# Patient Record
Sex: FEMALE | Race: WHITE | NOT HISPANIC OR LATINO | Employment: OTHER | URBAN - METROPOLITAN AREA
[De-identification: names, ages, dates, MRNs, and addresses within clinical notes are randomized per-mention and may not be internally consistent; named-entity substitution may affect disease eponyms.]

---

## 2017-03-16 ENCOUNTER — GENERIC CONVERSION - ENCOUNTER (OUTPATIENT)
Dept: OTHER | Facility: OTHER | Age: 72
End: 2017-03-16

## 2017-03-30 ENCOUNTER — GENERIC CONVERSION - ENCOUNTER (OUTPATIENT)
Dept: OTHER | Facility: OTHER | Age: 72
End: 2017-03-30

## 2017-04-26 ENCOUNTER — GENERIC CONVERSION - ENCOUNTER (OUTPATIENT)
Dept: OTHER | Facility: OTHER | Age: 72
End: 2017-04-26

## 2017-04-28 ENCOUNTER — GENERIC CONVERSION - ENCOUNTER (OUTPATIENT)
Dept: OTHER | Facility: OTHER | Age: 72
End: 2017-04-28

## 2017-05-23 ENCOUNTER — ALLSCRIPTS OFFICE VISIT (OUTPATIENT)
Dept: OTHER | Facility: OTHER | Age: 72
End: 2017-05-23

## 2017-05-23 ENCOUNTER — GENERIC CONVERSION - ENCOUNTER (OUTPATIENT)
Dept: OTHER | Facility: OTHER | Age: 72
End: 2017-05-23

## 2017-05-23 DIAGNOSIS — R06.02 SHORTNESS OF BREATH: ICD-10-CM

## 2017-05-23 DIAGNOSIS — I35.1 NONRHEUMATIC AORTIC VALVE INSUFFICIENCY: ICD-10-CM

## 2017-05-23 DIAGNOSIS — I25.10 ATHEROSCLEROTIC HEART DISEASE OF NATIVE CORONARY ARTERY WITHOUT ANGINA PECTORIS: ICD-10-CM

## 2017-06-16 ENCOUNTER — HOSPITAL ENCOUNTER (OUTPATIENT)
Dept: NON INVASIVE DIAGNOSTICS | Facility: CLINIC | Age: 72
Discharge: HOME | End: 2017-06-16
Payer: MEDICARE

## 2017-06-16 DIAGNOSIS — I35.1 NONRHEUMATIC AORTIC VALVE INSUFFICIENCY: ICD-10-CM

## 2017-06-16 PROCEDURE — 93880 EXTRACRANIAL BILAT STUDY: CPT

## 2017-06-21 ENCOUNTER — HOSPITAL ENCOUNTER (OUTPATIENT)
Dept: NON INVASIVE DIAGNOSTICS | Facility: CLINIC | Age: 72
Discharge: HOME | End: 2017-06-21
Payer: MEDICARE

## 2017-06-21 DIAGNOSIS — I25.10 ATHEROSCLEROTIC HEART DISEASE OF NATIVE CORONARY ARTERY WITHOUT ANGINA PECTORIS: ICD-10-CM

## 2017-06-21 DIAGNOSIS — R06.02 SHORTNESS OF BREATH: ICD-10-CM

## 2017-06-21 DIAGNOSIS — I35.1 NONRHEUMATIC AORTIC VALVE INSUFFICIENCY: ICD-10-CM

## 2017-06-21 LAB
ARRHY DURING EX: NORMAL
CHEST PAIN STATEMENT: NORMAL
MAX DIASTOLIC BP: 62 MMHG
MAX HEART RATE: 78 BPM
MAX PREDICTED HEART RATE: 150 BPM
MAX. SYSTOLIC BP: 140 MMHG
PROTOCOL NAME: NORMAL
REASON FOR TERMINATION: NORMAL
TARGET HR FORMULA: NORMAL
TEST INDICATION: NORMAL
TIME IN EXERCISE PHASE: 180 S

## 2017-06-21 PROCEDURE — 93017 CV STRESS TEST TRACING ONLY: CPT

## 2017-06-21 PROCEDURE — A9502 TC99M TETROFOSMIN: HCPCS

## 2017-06-21 PROCEDURE — 93306 TTE W/DOPPLER COMPLETE: CPT

## 2017-06-21 PROCEDURE — 78452 HT MUSCLE IMAGE SPECT MULT: CPT

## 2017-06-21 RX ORDER — AMINOPHYLLINE DIHYDRATE 25 MG/ML
50 INJECTION, SOLUTION INTRAVENOUS ONCE
Status: COMPLETED | OUTPATIENT
Start: 2017-06-21 | End: 2017-06-21

## 2017-06-21 RX ADMIN — REGADENOSON 0.4 MG: 0.08 INJECTION, SOLUTION INTRAVENOUS at 13:16

## 2017-06-21 RX ADMIN — AMINOPHYLLINE 50 MG: 25 INJECTION, SOLUTION INTRAVENOUS at 13:46

## 2017-06-23 ENCOUNTER — GENERIC CONVERSION - ENCOUNTER (OUTPATIENT)
Dept: OTHER | Facility: OTHER | Age: 72
End: 2017-06-23

## 2017-09-26 ENCOUNTER — ALLSCRIPTS OFFICE VISIT (OUTPATIENT)
Dept: OTHER | Facility: OTHER | Age: 72
End: 2017-09-26

## 2018-01-11 ENCOUNTER — GENERIC CONVERSION - ENCOUNTER (OUTPATIENT)
Dept: OTHER | Facility: OTHER | Age: 73
End: 2018-01-11

## 2018-01-11 NOTE — PROGRESS NOTES
REPORT NAME: Progress Notes Report  VISIT DATE: 3/16/2017  VISIT TIME: 3:16 PM EDT  PATIENT NAME: Anderson Bass RECORD NUMBER: [id number]  YOB: 1945  AGE: 80  REFERRING  PHYSICIAN: Wayne Serra  SUPERVISING CLINICIAN: Seda Wong CARE PROVIDER: Huber Damon   PATIENT HOME ADDRESS: [address] [place] [address] [address]  PATIENT HOME PHONE: [phone]  SOCIAL SECURITY NUMBER:    DIAGNOSIS 1: Phlebitis/Thrombophlebitis: deep veins of lower extremities /  451 11  DIAGNOSIS 2: Long-term (current) use of anticoagulants / V58 61  INR RANGE: 2 - 3  INR GOAL: 2 5  TREATMENT START DATE:   TREATMENT END DATE:   NEXT  VISIT:     NEXT SELF TEST DATE: 5/5/2017  VISIT RESULTS  ENCOUNTER NUMBER:   TEST LOCATION: Home Testing  TEST TYPE: Patient Self Test (PST)  VISIT TYPE:   CURRENT INR: 5 PROTIME:   SPECIMEN COL AND RPT DATE: 3/16/2017 3:16 PM EDT   VITAL SIGNS  PULSE:  BP: / WEIGHT:  HEIGHT:  TEMP:   CURRENT ANTICOAGULANT DOSING SCHEDULE  DOSE SIZE: 5mg    ANTICOAGULANT TYPE: COUMADIN  DOSING REGIMEN  Sun       Mon       Tues      Wed       Thurs     Fri       Sat  Total/Wk   7 5       3 75      7 5       3 75      7 5       3 75      7 5       41 25  PATIENT MEDICATION INSTRUCTION: Yes  PATIENT NUTRITIONAL COUNSELING: No  PATIENT BRUISING INSTRUCTION: No  LAST EDUCATION DATE:   PST DETAILS  PST PATIENT TEST  DATE: 3/16/2017  PST PATIENT SUBMISSION DATE: 3/16/2017 1:36 PM EDT  PST INR: 5  NEXT PST TEST DATE: 5/5/2017  ASSESSMENT QUESTIONS AND ANSWERS:  Do you have any changes to your physician or personal information such as  address, phone  number or insurance? (If you answer yes, please provide  additional information in the comments section  ) : n/a  PREVIOUS VISIT INFORMATION  VISITDATE  INRGoal INR   Sun    Mon    Tues   Wed    Thurs  Fri    Sat  Total/wk  3/16/2017   2 5      5     7 5    3 75   7 5    3 75   7 5    3 75   7 5  41 25  3/1/2017    2 5     3 6   15 15     10     15     15     10     15  95  2/13/2017   2 5     2 6   15     15     10     15     15     10     15  95  1/25/2017   2 5      3 1   15     15     10     15     15     10     15  95  ADDITIONAL PREVIOUS VISIT INFORMATION  VISITDATE   PRIMARY RX               DOSE      CrCl  3/16/2017   COUMADIN                 5mg  3/1/2017    COUMADIN                 5mg   2/13/2017   COUMADIN                 5mg  1/25/2017   COUMADIN                 5mg  OTHER CURRENT MEDICATIONS:  PROGRESS NOTES: PER DR PHELPS HOLD 2 DAYS THEN TAKE 7 5/3  75MG (SHE WAS  TAKING 7 5MG QD) RECHECK 1 WEEK, SPOKE WITH PT  PATIENT  INSTRUCTIONS: SEE PROGRESS NOTE  TEST LOCATION: Home Testing, ,           ,             INBOUND LAB DATA:  Lab       Lab Value Col Date                 Rpt Date                 Lab  Reference Range  Electronically signed by: Richard Gomez   on 3/16/2017 3:16 PM EDT              Electronically signed Slade VASQUEZ    May  8 2017  5:45PM EST

## 2018-01-11 NOTE — RESULT NOTES
Verified Results  (1) PT WITH INR 24Apr2017 08:59AM [facility]     Test Name Result Flag Reference   INR 1 50 L 2 000 - 3 000   REPORT NAME: Progress Notes Report  VISIT DATE: 4/24/2017  VISIT TIME: 8:59 AM EDT  PATIENT NAME: Ricardo Crane   MEDICAL RECORD NUMBER: [id number]  YOB: 1945  AGE: 80  REFERRING PHYSICIAN: Mariana Caballero  SUPERVISING CLINICIAN: Seda Wong CARE PROVIDER: Ken Dodson   PATIENT HOME ADDRESS: [address] [place] [address] [address], [address]  PATIENT HOME PHONE: [phone]  SOCIAL SECURITY NUMBER:   DIAGNOSIS 1: Phlebitis/Thrombophlebitis: deep veins of lower extremities /  451 11  DIAGNOSIS 2: Long-term (current) use of anticoagulants / V58 61  INR RANGE: 2 - 3  INR GOAL: 2 5  TREATMENT START DATE:   TREATMENT END DATE:   NEXT VISIT:     NEXT SELF TEST DATE: 4/28/2017  VISIT RESULTS  ENCOUNTER NUMBER:   TEST LOCATION: Home Testing  TEST TYPE: Patient Self Test (PST)  VISIT TYPE:   CURRENT INR: 1 5 PROTIME:   SPECIMEN COL AND RPT DATE: 4/24/2017 8:59 AM  EDT  VITAL SIGNS  PULSE:  BP: / WEIGHT:  HEIGHT:  TEMP:   CURRENT ANTICOAGULANT DOSING SCHEDULE  DOSE SIZE: 5mg    ANTICOAGULANT TYPE: COUMADIN  DOSING REGIMEN  Sun       Mon       Tues      Wed       Thurs     Fri       Sat  Total/Wk  7 5       3 75      3 75      7 5       3 75      3 75      7 5       37 5  PATIENT MEDICATION INSTRUCTION: Yes  PATIENT NUTRITIONAL COUNSELING: No  PATIENT BRUISING INSTRUCTION: No  LAST EDUCATION DATE:   PST DETAILS  PST PATIENT TEST DATE: 4/24/2017  PST PATIENT SUBMISSION DATE: 4/24/2017 10:46 AM EDT  PST INR: 1 5  NEXT PST TEST DATE: 4/28/2017  ASSESSMENT QUESTIONS AND ANSWERS:  PREVIOUS VISIT INFORMATION  VISITDATE  INRGoal INR   Sun    Mon    Tues   Wed    Thurs  Fri    Sat  Total/wk  4/24/2017   2 5     1 5   7 5    3 75   3 75   7 5    3 75   3 75   7 5  37 5  3/30/2017   2 5     3 5   7 5    3 75   3 75   7 5    3 75   3 75   7 5  37 5  3/16/2017   2 5     5 7  5    3 75   7 5    3 75   7 5    3 75   7 5  41 25  3/1/2017    2 5     3 6   15     15     10     15     15     10     15  95  ADDITIONAL PREVIOUS VISIT INFORMATION  VISITDATE   PRIMARY RX               DOSE      CrCl  4/24/2017   COUMADIN                 5mg  3/30/2017   COUMADIN                 5mg  3/16/2017   COUMADIN                 5mg  3/1/2017    COUMADIN                 5mg  OTHER CURRENT MEDICATIONS:  COUMADIN  PROGRESS NOTES: PER DR Carmelia Baumgarten SAME DOSE RECHECK LATER THIS WEEK, JOE LEFT  MESSAGE ON NEW NUMBER VM  PATIENT INSTRUCTIONS: SEE PROGRESS NOTE  TEST LOCATION: Home Testing, ,           ,             INBOUND LAB DATA:  Lab       Lab Value Col Date                 Rpt Date                 Lab  Reference Range  Electronically signed by: Kem Choe  on 4/26/2017 8:59 AM EDT

## 2018-01-11 NOTE — RESULT NOTES
Verified Results  ECHO COMPLETE WITH CONTRAST IF INDICATED 2017 10:53AM Shakeel John Order Number: [id number]    - Patient Instructions: To schedule this appointment, please contact Central Scheduling at [phone]  Test Name Result Flag Reference   ECHO COMPLETE WITH CONTRAST IF INDICATED (Report)     [facility], [address] [place] [address]   [phone]     Transthoracic Echocardiogram   2D, M-mode, Doppler, and Color Doppler     Study date: 2017     Patient: Tim Falcon   MR number: [id number]   Account number: [de-identified]   : 1945   Age: 80 years   Gender: Female   Status: Outpatient   Location: [facility]   Height: 61 in   Weight: 241 lb   BP: 158/ 68 mmHg     Indications: Aortic valve disorder  Diagnoses: I35 0 - Nonrheumatic aortic (valve) stenosis     Sonographer: Palacios RCS   Interpreting Physician: Emily Galarza MD   Primary Physician: Anabel Jose DO   Referring Physician: Emily Galarza MD   Group: [facility]     SUMMARY     LEFT VENTRICLE:   There were no regional wall motion abnormalities  There was mild concentric hypertrophy  Doppler parameters were consistent with abnormal left ventricular relaxation (grade 1 diastolic dysfunction)  LEFT ATRIUM:   The atrium was mildly dilated  MITRAL VALVE:   There was mild annular calcification  There was trace regurgitation  AORTIC VALVE:   There was mild stenosis  TRICUSPID VALVE:   There was trace regurgitation  AORTA:   The root exhibited normal size and mild fibrocalcific change  HISTORY: PRIOR HISTORY: COPD  A Fib  CAD  Risk factors: hypertension, diabetes, hypercholesterolemia, and morbid obesity  PRIOR PROCEDURES: Stent  PROCEDURE: The study was performed in the [address] [place] [address]  This was a routine study  The transthoracic approach was used   The study included complete 2D imaging, M-mode, complete spectral Doppler, and color Doppler  The   heart rate was 62 bpm, at the start of the study  Images were obtained from the parasternal, apical, subcostal, and suprasternal notch acoustic windows  This was a technically difficult study  LEFT VENTRICLE: Size was normal  Systolic function was by visual assessment  Ejection fraction was estimated to be 65 %  There were no regional wall motion abnormalities  There was mild concentric hypertrophy  DOPPLER: Doppler parameters   were consistent with abnormal left ventricular relaxation (grade 1 diastolic dysfunction)  RIGHT VENTRICLE: The size was normal  Systolic function was normal  Wall thickness was normal      LEFT ATRIUM: The atrium was mildly dilated  RIGHT ATRIUM: Size was normal      MITRAL VALVE: There was mild annular calcification  DOPPLER: There was trace regurgitation  AORTIC VALVE: The valve was trileaflet  Leaflets exhibited mild calcification  DOPPLER: There was mild stenosis  TRICUSPID VALVE: DOPPLER: There was trace regurgitation  PERICARDIUM: There was no pericardial effusion  The pericardium was normal in appearance  AORTA: The root exhibited normal size and mild fibrocalcific change  PULMONARY ARTERY: DOPPLER: Systolic pressure was within the normal range  SYSTEM MEASUREMENT TABLES     Apical four chamber   4 chamber Left Atrium Volume Index; Planimetry; End Systole; Apical four chamber;: 19 24 cm2   Right Atrium Systolic Area; Planimetry; End Systole; Apical four chamber;: 15 86 cm2   TAPSE: 22 6 mm     Unspecified Scan Mode   Aortic Root Diameter; End Systole;: 26 5 mm   Cardiovascular Orifice Diameter; End Systole;: 21 6 mm   Gradient Pressure, Peak; Simplified Bernoulli; Antegrade Flow; Systole;: 26 mm[Hg]   Gradient pressure, average; Simplified Bernoulli; Antegrade Flow; Systole;: 13 9 mm[Hg]   Left atrial diameter; End Diastole;: 42 3 mm   Cardiac Output;  Teichholz; Systole;: 3 83 L/min   Interventricular Septum Diastolic Thickness; Teichholz; End Diastole;: 12 5 mm   Left Ventricle Internal End Diastolic Dimension; Teichholz;: 46 8 mm   Left Ventricle Internal Systolic Dimension; Teichholz; End Systole;: 31 2 mm   Left Ventricle Posterior Wall Diastolic Thickness; Teichholz; End Diastole;: 12 2 mm   Left Ventricular Ejection Fraction; Teichholz;: 62 %   Stroke volume;  Teichholz; Systole;: 62 8 ml   Mitral Valve Area; Area by Pressure Half-Time; Systole;: 4 31 cm2   Mitral Valve E to A Ratio; Systole;: 0 79   Maximum Tricuspid valve regurgitation pressure gradient; Regurgitant Flow; Systole;: 24 3 mm[Hg]     [facility]     Prepared and electronically signed by     Lea Calero MD   Signed 21-Jun-2017 13:33:21

## 2018-01-13 VITALS
HEART RATE: 60 BPM | HEIGHT: 62 IN | DIASTOLIC BLOOD PRESSURE: 68 MMHG | WEIGHT: 241.38 LBS | BODY MASS INDEX: 44.42 KG/M2 | SYSTOLIC BLOOD PRESSURE: 158 MMHG

## 2018-01-14 VITALS
BODY MASS INDEX: 44.97 KG/M2 | HEIGHT: 62 IN | WEIGHT: 244.38 LBS | HEART RATE: 71 BPM | DIASTOLIC BLOOD PRESSURE: 72 MMHG | SYSTOLIC BLOOD PRESSURE: 124 MMHG

## 2018-01-15 NOTE — RESULT NOTES
Verified Results  (1) PT WITH INR 28Apr2017 04:33PM Crys Miranda     Test Name Result Flag Reference   INR 1 80 L 2 000 - 3 000   REPORT NAME: Progress Notes Report  VISIT DATE: 4/28/2017  VISIT TIME: 4:33 PM EDT  PATIENT NAME: Viviana Muniz   MEDICAL RECORD NUMBER: [id number]  YOB: 1945  AGE: 80  REFERRING PHYSICIAN: Mirella Rondon  SUPERVISING CLINICIAN: Seda Wong CARE PROVIDER: Melly Cheek   PATIENT HOME ADDRESS: [address] [place] [address] [address], [address]  PATIENT HOME PHONE: [phone]  SOCIAL SECURITY NUMBER:   DIAGNOSIS 1: Phlebitis/Thrombophlebitis: deep veins of lower extremities /  451 11  DIAGNOSIS 2: Long-term (current) use of anticoagulants / V58 61  INR RANGE: 2 - 3  INR GOAL: 2 5  TREATMENT START DATE:   TREATMENT END DATE:   NEXT VISIT:     NEXT SELF TEST DATE: 5/5/2017  VISIT RESULTS  ENCOUNTER NUMBER:   TEST LOCATION: Home Testing  TEST TYPE: Patient Self Test (PST)  VISIT TYPE:   CURRENT INR: 1 8 PROTIME:   SPECIMEN COL AND RPT DATE: 4/28/2017 4:33 PM  EDT  VITAL SIGNS  PULSE:  BP: / WEIGHT:  HEIGHT:  TEMP:   CURRENT ANTICOAGULANT DOSING SCHEDULE  DOSE SIZE: 5mg    ANTICOAGULANT TYPE: COUMADIN  DOSING REGIMEN  Sun       Mon       Tues      Wed       Thurs     Fri       Sat  Total/Wk  7 5       3 75      3 75      7 5       3 75      3 75      7 5       37 5  PATIENT MEDICATION INSTRUCTION: Yes  PATIENT NUTRITIONAL COUNSELING: No  PATIENT BRUISING INSTRUCTION: No  LAST EDUCATION DATE:   PST DETAILS  PST PATIENT TEST DATE: 4/28/2017  PST PATIENT SUBMISSION DATE: 4/28/2017 4:04 PM EDT  PST INR: 1 8  NEXT PST TEST DATE: 5/5/2017  ASSESSMENT QUESTIONS AND ANSWERS:  Do you have any changes to your physician or personal information such as  address, phone number or insurance? (If you answer yes, please provide  additional information in the comments section  ) : No  PREVIOUS VISIT INFORMATION  VISITDATE  INRGoal INR   Sun    Mon    Tues   Wed    Kanika Kurk  Fri Sat  Total/wk  4/28/2017   2 5     1 8   7 5    3 75   3 75   7 5    3 75   3 75   7 5  37 5  4/24/2017   2 5     1 5   7 5    3 75   3 75   7 5    3 75   3 75   7 5  37 5  3/30/2017   2 5     3 5   7 5    3 75   3 75   7 5    3 75   3 75   7 5  37 5  3/16/2017   2 5     5     7 5    3 75   7 5    3 75   7 5    3 75   7 5  41 25  ADDITIONAL PREVIOUS VISIT INFORMATION  VISITDATE   PRIMARY RX               DOSE      CrCl  4/28/2017   COUMADIN                 5mg  4/24/2017   COUMADIN                 5mg  3/30/2017   COUMADIN                 5mg  3/16/2017   COUMADIN                 5mg  OTHER CURRENT MEDICATIONS:  COUMADIN  PROGRESS NOTES: PER AL/PA SAME DOSE RECHECK 2 WEEKS, LMOM  PATIENT INSTRUCTIONS: SEE PROGRESS NOTE  TEST LOCATION: Home Testing, ,           ,             INBOUND LAB DATA:  Lab       Lab Value Col Date                 Rpt Date                 Lab  Reference Range  Electronically signed by: Brendan Thomas  on 4/28/2017 4:33 PM EDT

## 2018-01-15 NOTE — PROGRESS NOTES
REPORT NAME: Progress Notes Report  VISIT DATE: 4/28/2017  VISIT TIME: 4:33 PM EDT  PATIENT NAME: Huey Melissa RECORD NUMBER: [id number]  YOB: 1945  AGE: 80  REFERRING  PHYSICIAN: Alayna Maxwell  SUPERVISING CLINICIAN: Seda Wong CARE PROVIDER: Bartolo Watts   PATIENT HOME ADDRESS: [address] [place] [address] [address], [address]  PATIENT HOME PHONE: [phone]  SOCIAL SECURITY NUMBER:    DIAGNOSIS 1: Phlebitis/Thrombophlebitis: deep veins of lower extremities /  451 11  DIAGNOSIS 2: Long-term (current) use of anticoagulants / V58 61  INR RANGE: 2 - 3  INR GOAL: 2 5  TREATMENT START DATE:   TREATMENT END DATE:   NEXT  VISIT:     NEXT SELF TEST DATE: 5/5/2017  VISIT RESULTS  ENCOUNTER NUMBER:   TEST LOCATION: Home Testing  TEST TYPE: Patient Self Test (PST)  VISIT TYPE:   CURRENT INR: 1 8 PROTIME:   SPECIMEN COL AND RPT DATE: 4/28/2017 4:33 PM   EDT  VITAL SIGNS  PULSE:  BP: / WEIGHT:  HEIGHT:  TEMP:   CURRENT ANTICOAGULANT DOSING SCHEDULE  DOSE SIZE: 5mg    ANTICOAGULANT TYPE: COUMADIN  DOSING REGIMEN  Sun       Mon       Tues      Wed       Thurs     Fri       Sat  Total/Wk   3 75      7 5       3 75      7 5       3 75      7 5       3 75      37 5  PATIENT MEDICATION INSTRUCTION: Yes  PATIENT NUTRITIONAL COUNSELING: No  PATIENT BRUISING INSTRUCTION: No  LAST EDUCATION DATE:   PST DETAILS  PST PATIENT TEST  DATE: 4/28/2017  PST PATIENT SUBMISSION DATE: 4/28/2017 4:04 PM EDT  PST INR: 1 8  NEXT PST TEST DATE: 5/5/2017  ASSESSMENT QUESTIONS AND ANSWERS:  Do you have any changes to your physician or personal information such as  address, phone  number or insurance? (If you answer yes, please provide  additional information in the comments section  ) : No  PREVIOUS VISIT INFORMATION  VISITDATE  INRGoal INR   Sun    Mon    Tues   Wed    Thurs  Fri    Sat  Total/wk  4/28/2017   2 5      1 8   3 75   7 5    3 75   7 5    3 75   7 5    3 75  37 5  4/24/2017   2 5     1 5 7  5    3 75   3 75   7 5    3 75   3 75   7 5  37 5  3/30/2017   2 5     3 5   7 5    3 75   3 75   7 5    3 75   3 75   7 5  37 5  3/16/2017    2 5     5     7 5    3 75   7 5    3 75   7 5    3 75   7 5  41 25  ADDITIONAL PREVIOUS VISIT INFORMATION  VISITDATE   PRIMARY RX               DOSE      CrCl  4/28/2017   COUMADIN                 5mg  4/24/2017   COUMADIN                  5mg  3/30/2017   COUMADIN                 5mg  3/16/2017   COUMADIN                 5mg  OTHER CURRENT MEDICATIONS:  COUMADIN  PROGRESS NOTES: PER AL/PA SAME DOSE RECHECK 2 WEEKS, LMOM  ---- Additional Notes entered on 5/1/2017 9:34  AM EDT by Ольга Diamond  :  PER DR Adriana Padilla TAKE 3 75/7 5MG RECHECK 1 WEEK  PATIENT INSTRUCTIONS: SEE PROGRESS NOTE  TEST LOCATION: Home Testing, ,           ,             INBOUND LAB DATA:  Lab       Lab Value Col Date                  Rpt Date                 Lab  Reference Range  Electronically signed by: Ольга Diamond  on 5/1/2017 9:34 AM EDT              Electronically signed Brooke VASQUEZ    May  1 2017 12:09PM EST

## 2018-01-16 NOTE — PROGRESS NOTES
REPORT NAME: Progress Notes Report  VISIT DATE: 3/30/2017  VISIT TIME: 11:38 AM EDT  PATIENT NAME: Kojo Gamez   MEDICAL RECORD NUMBER: [id number]  YOB: 1945  AGE: 80  REFERRING  PHYSICIAN: Lorena Oliva  SUPERVISING CLINICIAN: Seda Wong CARE PROVIDER: Eliseo Dumas   PATIENT HOME ADDRESS: [address] [place] [address] [address]  PATIENT HOME PHONE: [phone]  SOCIAL SECURITY NUMBER:    DIAGNOSIS 1: Phlebitis/Thrombophlebitis: deep veins of lower extremities /  451 11  DIAGNOSIS 2: Long-term (current) use of anticoagulants / V58 61  INR RANGE: 2 - 3  INR GOAL: 2 5  TREATMENT START DATE:   TREATMENT END DATE:   NEXT  VISIT:     NEXT SELF TEST DATE: 5/5/2017  VISIT RESULTS  ENCOUNTER NUMBER:   TEST LOCATION: Home Testing  TEST TYPE: Patient Self Test (PST)  VISIT TYPE:   CURRENT INR: 3 5 PROTIME:   SPECIMEN COL AND RPT DATE: 3/30/2017 11:38 AM   EDT  VITAL SIGNS  PULSE:  BP: / WEIGHT:  HEIGHT:  TEMP:   CURRENT ANTICOAGULANT DOSING SCHEDULE  DOSE SIZE: 5mg    ANTICOAGULANT TYPE: COUMADIN  DOSING REGIMEN  Sun       Mon Tues Wed Thurs Fri       Sat  Total/Wk   7 5       3 75      3 75      7 5       3 75      3 75      7 5       37 5  PATIENT MEDICATION INSTRUCTION: Yes  PATIENT NUTRITIONAL COUNSELING: No  PATIENT BRUISING INSTRUCTION: No  LAST EDUCATION DATE:   PST DETAILS  PST PATIENT TEST  DATE: 3/30/2017  PST PATIENT SUBMISSION DATE: 3/30/2017 8:10 AM EDT  PST INR: 3 5  NEXT PST TEST DATE: 5/5/2017  ASSESSMENT QUESTIONS AND ANSWERS:  Do you have any changes to your physician or personal information such as  address, phone  number or insurance? (If you answer yes, please provide  additional information in the comments section  ) : n/a  PREVIOUS VISIT INFORMATION  VISITDATE  INRGoal INR   Sun    Mon    Tues   Wed    Thurs  Fri    Sat  Total/wk  3/30/2017   2 5      3 5   7 5    3 75   3 75   7 5    3 75   3 75   7 5  37 5  3/16/2017   2 5     5 7  5    3 75   7 5    3 75   7 5    3 75   7 5  41 25  3/1/2017    2 5     3 6   15     15     10     15     15     10     15  95  2/13/2017   2 5      2 6   15     15     10     15     15     10     15  95  ADDITIONAL PREVIOUS VISIT INFORMATION  VISITDATE   PRIMARY RX               DOSE      CrCl  3/30/2017   COUMADIN                 5mg  3/16/2017   COUMADIN                 5mg   3/1/2017    COUMADIN                 5mg  2/13/2017   COUMADIN                 5mg  OTHER CURRENT MEDICATIONS:  PROGRESS NOTES: PER DR PHELPS DECREASE TO 7 5/3 75/3 75MG RECHECK 1 WEEK,  LEFT DETAILED MESSAGE ON MACHINE  PATIENT INSTRUCTIONS:  SEE PROGRESS NOTE  TEST LOCATION: Home Testing, ,           ,             INBOUND LAB DATA:  Lab       Lab Value Col Date                 Rpt Date                 Lab  Reference Range  Electronically signed by: Keely Boone  on 3/30/2017  11:38 AM EDT              Electronically signed Phi VASQUEZ    May  8 2017  5:44PM EST

## 2018-02-01 ENCOUNTER — TELEPHONE (OUTPATIENT)
Dept: CARDIOLOGY CLINIC | Facility: CLINIC | Age: 73
End: 2018-02-01

## 2018-02-01 NOTE — TELEPHONE ENCOUNTER
Is patient going to see Dr Frank Gutierrez or Dr Terrance Mcdonald, does she have a future appointment?

## 2018-02-05 DIAGNOSIS — I87.2 VENOUS INSUFFICIENCY: Primary | ICD-10-CM

## 2018-02-06 RX ORDER — WARFARIN SODIUM 7.5 MG/1
TABLET ORAL
Qty: 90 TABLET | Refills: 0 | Status: SHIPPED | OUTPATIENT
Start: 2018-02-06 | End: 2018-03-27 | Stop reason: HOSPADM

## 2018-02-12 ENCOUNTER — OFFICE VISIT (OUTPATIENT)
Dept: CARDIOLOGY CLINIC | Facility: CLINIC | Age: 73
End: 2018-02-12
Payer: MEDICARE

## 2018-02-12 VITALS
SYSTOLIC BLOOD PRESSURE: 136 MMHG | WEIGHT: 244 LBS | HEART RATE: 65 BPM | BODY MASS INDEX: 44.9 KG/M2 | HEIGHT: 62 IN | DIASTOLIC BLOOD PRESSURE: 86 MMHG | OXYGEN SATURATION: 98 %

## 2018-02-12 DIAGNOSIS — I10 ESSENTIAL HYPERTENSION: ICD-10-CM

## 2018-02-12 DIAGNOSIS — I35.0 AORTIC STENOSIS, MILD: ICD-10-CM

## 2018-02-12 DIAGNOSIS — I25.10 CORONARY ARTERIOSCLEROSIS: Primary | ICD-10-CM

## 2018-02-12 DIAGNOSIS — E78.2 MIXED HYPERLIPIDEMIA: ICD-10-CM

## 2018-02-12 DIAGNOSIS — E66.01 MORBID OBESITY DUE TO EXCESS CALORIES (HCC): ICD-10-CM

## 2018-02-12 DIAGNOSIS — D68.51 FACTOR V LEIDEN MUTATION (HCC): ICD-10-CM

## 2018-02-12 DIAGNOSIS — I51.89 DIASTOLIC DYSFUNCTION: ICD-10-CM

## 2018-02-12 PROCEDURE — 99214 OFFICE O/P EST MOD 30 MIN: CPT | Performed by: INTERNAL MEDICINE

## 2018-02-12 RX ORDER — WARFARIN SODIUM 5 MG/1
1 TABLET ORAL DAILY
COMMUNITY
Start: 2014-05-06 | End: 2018-03-27 | Stop reason: HOSPADM

## 2018-02-12 RX ORDER — NICOTINE POLACRILEX 4 MG/1
1 GUM, CHEWING ORAL 2 TIMES DAILY
COMMUNITY

## 2018-02-12 RX ORDER — POTASSIUM CHLORIDE 20 MEQ/1
1 TABLET, EXTENDED RELEASE ORAL 2 TIMES DAILY
COMMUNITY
Start: 2015-03-30 | End: 2018-03-22 | Stop reason: SDUPTHER

## 2018-02-12 RX ORDER — ROSUVASTATIN CALCIUM 10 MG/1
0.5 TABLET, COATED ORAL DAILY
COMMUNITY
Start: 2016-01-13

## 2018-02-12 RX ORDER — NADOLOL 40 MG/1
TABLET ORAL
COMMUNITY
End: 2018-03-27 | Stop reason: HOSPADM

## 2018-02-12 RX ORDER — GLIMEPIRIDE 2 MG/1
1 TABLET ORAL DAILY
COMMUNITY
Start: 2016-07-29

## 2018-02-12 RX ORDER — DIPHENOXYLATE HYDROCHLORIDE AND ATROPINE SULFATE 2.5; .025 MG/1; MG/1
1 TABLET ORAL DAILY
COMMUNITY

## 2018-02-12 RX ORDER — FUROSEMIDE 40 MG/1
1 TABLET ORAL DAILY
COMMUNITY
Start: 2016-06-06

## 2018-02-12 RX ORDER — CETIRIZINE HYDROCHLORIDE 10 MG/1
1 TABLET ORAL DAILY
COMMUNITY

## 2018-02-12 RX ORDER — WARFARIN SODIUM 10 MG/1
1 TABLET ORAL DAILY
COMMUNITY
Start: 2016-03-31

## 2018-02-12 RX ORDER — LISINOPRIL 2.5 MG/1
1 TABLET ORAL DAILY
Status: ON HOLD | COMMUNITY
Start: 2015-05-12 | End: 2018-03-27

## 2018-02-12 RX ORDER — BIOTIN 10 MG
TABLET ORAL
COMMUNITY

## 2018-02-12 NOTE — PROGRESS NOTES
CARDIOLOGY OFFICE VISIT  [facility]  [place] [address], [facility], [address] [place] [address], [facility], [address]  Tel: [phone]      NAME: Marti Layne  AGE: 71 y o  SEX: female  : 1945   MRN: [id number]      Chief Complaint:  Chief Complaint   Patient presents with    Follow-up     Aortic stenosis and HTN  currently on abx for cold  History of Present Illness:   Patient comes for follow up  States she is doing well from cardiac stand point  CAD s/p PCI to LAD and OM in  - denies chest pain / pressure, SOB, palpitations, lightheadedness, syncope, swelling feet, orthopnea, PND, claudication  On beta-blocker, Ace inhibitor, statin  Not on aspirin due to high bleeding risk along with Coumadin  HTN -  Has been hypertensive for many years  Taking medications regularly  Denies lightheadedness, headache, medication side effects  HLP -  Has had hyperlipidemia for many years  Taking statin regularly along with diet control  Denies myalgia  PCP closely monitoring the blood work  Obesity -  Trying to lose weight      Mild AS - per last echo    On anticoagulation with history of venous thrombosis and factor V deficiency       Past Medical History:  Past Medical History:   Diagnosis Date    Aortic ejection murmur     AS (aortic stenosis)     COPD (chronic obstructive pulmonary disease) (HCC)     Diabetes mellitus (HCC)     Exertional shortness of breath     HTN (hypertension)     Hyperlipidemia     Obesity          Past Surgical History:  Past Surgical History:   Procedure Laterality Date    ADENOIDECTOMY      CARDIAC CATHETERIZATION      HYSTERECTOMY      KNEE SURGERY      ROTATOR CUFF REPAIR      TONSILLECTOMY           Family History:  Family History   Problem Relation Age of Onset    COPD Mother          Social History:  Social History     Social History    Marital status:      Spouse name: N/A    Number of children: N/A    Years of education: N/A     Social History Main Topics    Smoking status: Former Smoker    Smokeless tobacco: Not on file    Alcohol use No    Drug use: Unknown    Sexual activity: Not on file     Other Topics Concern    Not on file     Social History Narrative    No narrative on file         Active Problems:  Patient Active Problem List   Diagnosis    Aortic stenosis, mild    Coronary arteriosclerosis    Hyperlipidemia    Hypertension    Asymptomatic varicose veins    Factor V Leiden mutation ([facility] )    Morbid obesity due to excess calories ([facility] )       The following portions of the patient's history were reviewed and updated as appropriate: past medical history, past surgical history, past family history,  past social history, current medications, allergies and problem list       Review of Systems:  Constitutional: Denies fever, chills, fatigue  Eyes: Denies eye redness, eye discharge, double vision  ENT: Denies hearing loss, tinnitus, sneezing, nasal discharge, sore throat   Respiratory: Denies cough, expectoration, hemoptysis, shortness of breath  Cardiovascular: Denies chest pain, palpitations, orthopnea, PND, lower extremity swelling  Gastrointestinal: Denies abdominal pain, nausea, vomiting, hematemesis, diarrhea, bloody stools  Genito-Urinary: Denies dysuria, incontinence  Musculoskeletal: Denies back pain, joint pain, muscle pain  Neurologic: Denies confusion, lightheadedness, syncope, headache, focal weakness, sensory changes, seizures  Endocrine: Denies polyuria, polydipsia, temperature intolerance  Allergy and Immunology: Denies hives, insect bite sensitivity  Hematological and Lymphatic: Denies bleeding problems, swollen glands   Psychological: Denies depression, suicidal ideation, anxiety, panic  Dermatological: Denies pruritus, rash, skin lesion changes      Vitals:  Vitals:    02/12/18 1319   BP: 136/86   Pulse: 65   SpO2: 98%       Body mass index is 45 36 kg/m²  Weight (last 2 days)     Date/Time   Weight    02/12/18 1319  111 (244)                Physical Examination:  General: Patient is not in acute distress  Awake, alert, oriented in time, place and person  Responding to commands  Head: Normocephalic  Atraumatic  Eyes: Both pupils normal sized, round and reactive to light  Nonicteric  ENT: Normal external ear canals  Nares normal, no drainage  Lips and oral mucosa normal  Neck: Supple  JVP not raised  Trachea central  No thyromegaly  Lungs: Bilateral bronchovascular breath sounds with no crackles or rhonchi  Chest wall: No tenderness  Cardiovascular: RRR  S1 and S2 normal  No murmur, rub or gallop  Gastrointestinal: Abdomen soft, nontender  No guarding or rigidity  Liver and spleen not palpable  Bowel sounds present  Neurologic: Patient is awake, alert, oriented in time, place and person  Responding to command  Moving all extremities  Integumentary:  No skin rash  Lymphatic: No cervical lymphadenopathy  Back: Symmetric   No CVA tenderness  Extremities: No clubbing, cyanosis or edema      Laboratory Results:  CBC with diff:   Lab Results   Component Value Date    WBC 5 87 06/04/2015    RBC 4 10 06/04/2015    HGB 11 9 06/04/2015    HCT 36 6 06/04/2015    MCV 89 06/04/2015    MCH 29 0 06/04/2015    RDW 13 0 06/04/2015     06/04/2015       CMP:  Lab Results   Component Value Date    CREATININE 0 73 06/04/2015    BUN 22 06/04/2015     06/04/2015    K 4 8 06/04/2015     06/04/2015    CO2 26 06/04/2015    GLUCOSE 145 (H) 06/04/2015    PROT 8 3 (H) 06/04/2015    ALKPHOS 49 06/04/2015    ALT 75 (H) 06/04/2015    AST 65 (H) 06/04/2015       No results found for: HGBA1C, MG, PHOS    No results found for: TROPONINI, CKMB, CKTOTAL    Lipid Profile:   Lab Results   Component Value Date    CHOL 143 06/04/2015     Lab Results   Component Value Date    HDL 36 06/04/2015     Lab Results   Component Value Date    LDLCALC 59 06/04/2015     Lab Results Component Value Date    TRIG 238 2015       Cardiac testing:   Results for orders placed during the hospital encounter of 17   Echo complete with contrast if indicated    Narrative [address] [facility]  [phone]    Transthoracic Echocardiogram  2D, M-mode, Doppler, and Color Doppler    Study date:  2017    Patient: Марина Gregory  MR number: [id number]  Account number: [de-identified]  : 1945  Age: 80 years  Gender: Female  Status: Outpatient  Location: [facility]  Height: 61 in  Weight: 241 lb  BP: 158/ 68 mmHg    Indications: Aortic valve disorder  Diagnoses: I35 0 - Nonrheumatic aortic (valve) stenosis    Sonographer:  Power RCS  Interpreting Physician:  Rosangela Acosta MD  Primary Physician:  Demi Alcazar DO  Referring Physician:  Rosangela Acosta MD  Group:  [facility]    SUMMARY    LEFT VENTRICLE:  There were no regional wall motion abnormalities  There was mild concentric hypertrophy  Doppler parameters were consistent with abnormal left ventricular relaxation (grade 1 diastolic dysfunction)  LEFT ATRIUM:  The atrium was mildly dilated  MITRAL VALVE:  There was mild annular calcification  There was trace regurgitation  AORTIC VALVE:  There was mild stenosis  TRICUSPID VALVE:  There was trace regurgitation  AORTA:  The root exhibited normal size and mild fibrocalcific change  HISTORY: PRIOR HISTORY: COPD  A Fib  CAD  Risk factors: hypertension, diabetes, hypercholesterolemia, and morbid obesity  PRIOR PROCEDURES: Stent  PROCEDURE: The study was performed in the [address] [place] [address]  This was a routine study  The transthoracic approach was used  The study included complete 2D imaging, M-mode, complete spectral Doppler, and color Doppler  The  heart rate was 62 bpm, at the start of the study   Images were obtained from the parasternal, apical, subcostal, and suprasternal notch acoustic windows  This was a technically difficult study  LEFT VENTRICLE: Size was normal  Systolic function was by visual assessment  Ejection fraction was estimated to be 65 %  There were no regional wall motion abnormalities  There was mild concentric hypertrophy  DOPPLER: Doppler parameters  were consistent with abnormal left ventricular relaxation (grade 1 diastolic dysfunction)  RIGHT VENTRICLE: The size was normal  Systolic function was normal  Wall thickness was normal     LEFT ATRIUM: The atrium was mildly dilated  RIGHT ATRIUM: Size was normal     MITRAL VALVE: There was mild annular calcification  DOPPLER: There was trace regurgitation  AORTIC VALVE: The valve was trileaflet  Leaflets exhibited mild calcification  DOPPLER: There was mild stenosis  TRICUSPID VALVE: DOPPLER: There was trace regurgitation  PERICARDIUM: There was no pericardial effusion  The pericardium was normal in appearance  AORTA: The root exhibited normal size and mild fibrocalcific change  PULMONARY ARTERY: DOPPLER: Systolic pressure was within the normal range  SYSTEM MEASUREMENT TABLES    Apical four chamber  4 chamber Left Atrium Volume Index; Planimetry; End Systole; Apical four chamber;: 19 24 cm2  Right Atrium Systolic Area; Planimetry; End Systole; Apical four chamber;: 15 86 cm2  TAPSE: 22 6 mm    Unspecified Scan Mode  Aortic Root Diameter; End Systole;: 26 5 mm  Cardiovascular Orifice Diameter; End Systole;: 21 6 mm  Gradient Pressure, Peak; Simplified Bernoulli; Antegrade Flow; Systole;: 26 mm[Hg]  Gradient pressure, average; Simplified Bernoulli; Antegrade Flow; Systole;: 13 9 mm[Hg]  Left atrial diameter; End Diastole;: 42 3 mm  Cardiac Output; Teichholz; Systole;: 3 83 L/min  Interventricular Septum Diastolic Thickness; Teichholz; End Diastole;: 12 5 mm  Left Ventricle Internal End Diastolic Dimension;  Teichholz;: 46 8 mm  Left Ventricle Internal Systolic Dimension; Teichholz; End Systole;: 31 2 mm  Left Ventricle Posterior Wall Diastolic Thickness; Teichholz; End Diastole;: 12 2 mm  Left Ventricular Ejection Fraction; Teichholz;: 62 %  Stroke volume;  Teichholz; Systole;: 62 8 ml  Mitral Valve Area; Area by Pressure Half-Time; Systole;: 4 31 cm2  Mitral Valve E to A Ratio; Systole;: 0 79  Maximum Tricuspid valve regurgitation pressure gradient; Regurgitant Flow; Systole;: 24 3 mm[Hg]    [facility] Accredited Echocardiography Laboratory    Prepared and electronically signed by    Supriya Perez MD  Signed 21-Jun-2017 13:33:21           Medications:    Current Outpatient Prescriptions:     Biotin 10 MG TABS, Take by mouth, Disp: , Rfl:     cetirizine (ZYRTEC ALLERGY) 10 mg tablet, Take 1 tablet by mouth daily, Disp: , Rfl:     furosemide (LASIX) 40 mg tablet, Take 1 tablet by mouth daily, Disp: , Rfl:     glimepiride (AMARYL) 2 mg tablet, Take 1 tablet by mouth daily, Disp: , Rfl:     lisinopril (ZESTRIL) 2 5 mg tablet, Take 1 tablet by mouth daily, Disp: , Rfl:     metFORMIN (GLUCOPHAGE) 1000 MG tablet, Take 1 tablet by mouth 2 (two) times a day, Disp: , Rfl:     multivitamin (THERAGRAN) TABS, Take 1 tablet by mouth daily, Disp: , Rfl:     nadolol (CORGARD) 40 mg tablet, Take by mouth, Disp: , Rfl:     Omega-3 Fatty Acids (FISH OIL) 645 MG CAPS, Take 1 capsule by mouth daily, Disp: , Rfl:     Omeprazole 20 MG TBEC, Take 1 capsule by mouth 2 (two) times a day, Disp: , Rfl:     potassium chloride (K-DUR,KLOR-CON) 20 mEq tablet, Take 1 tablet by mouth 2 (two) times a day, Disp: , Rfl:     rosuvastatin (CRESTOR) 10 MG tablet, Take 0 5 tablets by mouth daily, Disp: , Rfl:     warfarin (COUMADIN) 10 mg tablet, Take 1 tablet by mouth daily, Disp: , Rfl:     warfarin (COUMADIN) 5 mg tablet, Take 1 tablet by mouth daily, Disp: , Rfl:     warfarin (COUMADIN) 7 5 mg tablet, Take 1 tab daily or as directed, Disp: 90 tablet, Rfl: 0      Allergies: Allergies   Allergen Reactions    Iodine     Penicillins     Sulfa Antibiotics        Assessment and Plan:  1  Coronary arteriosclerosis  Stable  Continue statin, beta-blocker, Ace inhibitor  Not on aspirin due to high bleeding risk along with Coumadin    2  Aortic stenosis, mild  Latest echocardiogram findings discussed with the patient  Follow up with serial echocardiograms per guidelines    3  Essential hypertension  BP stable  Continue current medications    4  Mixed hyperlipidemia  Continue statin  Serial follow-up with lipid panel    5  Factor V Leiden mutation ([address] )  On lifelong Coumadin  INR goal 2-3  - Protime-INR    6  Morbid obesity due to excess calories (HCC)  Weight loss counseling provided    Recommend aggressive risk factor modification and therapeutic lifestyle changes  Low-salt, low-calorie, low-fat, low-cholesterol diet with regular exercise and to optimize weight  I will defer the ordering and monitoring of necessity lab studies to you, but I am available and happy to review and manage any of the data at your request in the future  Discussed concepts of atherosclerosis, including signs and symptoms of cardiac disease  Previous studies were reviewed  Safety measures were reviewed  Questions were entertained and answered  Patient was advised to report any problems requiring medical attention  Follow-up with PCP and appropriate specialist and lab work as discussed  Return for follow up visit as scheduled or earlier, if needed  Thank you for allowing me to participate in the care and evaluation of your patient  Should you have any questions, please feel free to contact me        Braden Boothe MD  6/28/0577,3:43 PM

## 2018-02-22 ENCOUNTER — TELEPHONE (OUTPATIENT)
Dept: CARDIOLOGY CLINIC | Facility: CLINIC | Age: 73
End: 2018-02-22

## 2018-02-26 NOTE — RESULT NOTES
Verified Results  (1) PT WITH INR [id number] 11:39AM Naomie Gay     Test Name Result Flag Reference   INR 1 90 L 2 000 - 3 000   REPORT NAME: Progress Notes Report  VISIT DATE: 1/10/2018  VISIT TIME: 11:39 AM EST  PATIENT NAME: Wiley Le RECORD NUMBER: [id number]  YOB: 1945  AGE: 71  REFERRING PHYSICIAN: Melecio Barnes  SUPERVISING CLINICIAN: Luke Melgoza CARE PROVIDER: Rik Johnson  [facility] [address]  PATIENT HOME PHONE: [phone]  SOCIAL SECURITY NUMBER:   DIAGNOSIS 1: Venous insufficiency (chronic) (peripheral) / I87 2  DIAGNOSIS 2: Long-term (current) use of anticoagulants / V58 61  INR RANGE: 2 - 3  INR GOAL: 2 5  TREATMENT START DATE:   TREATMENT END DATE:   NEXT VISIT:     NEXT SELF TEST DATE: 1/23/2018  VISIT RESULTS  ENCOUNTER NUMBER:   TEST LOCATION: Home Testing  TEST TYPE: Patient Self Test (PST)  VISIT TYPE:   CURRENT INR: 1 9 PROTIME:   SPECIMEN COL AND RPT DATE: 1/10/2018 11:39 AM  EST  VITAL SIGNS  PULSE:  BP: / WEIGHT:  HEIGHT:  TEMP:   CURRENT ANTICOAGULANT DOSING SCHEDULE  DOSE SIZE: 5mg    ANTICOAGULANT TYPE: COUMADIN  DOSING REGIMEN  Sun       Mon Tues Wed Thurs Fri       Sat  Total/Wk  7 5       7 5       7 5       7 5       7 5       7 5       7 5       52 5  PATIENT MEDICATION INSTRUCTION: Yes  PATIENT NUTRITIONAL COUNSELING: No  PATIENT BRUISING INSTRUCTION: No  LAST EDUCATION DATE:   PST DETAILS  PST PATIENT TEST DATE: 1/10/2018  PST PATIENT SUBMISSION DATE: 1/10/2018 3:10 PM EST  PST INR: 1 9  NEXT PST TEST DATE: 1/23/2018  ASSESSMENT QUESTIONS AND ANSWERS  PREVIOUS VISIT INFORMATION  VISITDATE  INRGoal INR   Sun    Mon Tues Wed Thurs Fri    Sat  Total/wk  1/10/2018   2 5     1 9   7 5    7 5    7 5    7 5    7 5    7 5    7 5  52 5  12/19/2017  2 5     2 1   7 5    7 5    7 5    7 5    7 5    7 5    7 5  52 5  11/28/2017  2 5     2     7 5    7 5 7 5    7 5    7 5    7 5    7 5  52 5  11/8/2017   2 5     2 9   7 5    7 5    7 5    7 5    7 5    7 5    7 5  52 5  ADDITIONAL PREVIOUS VISIT INFORMATION  VISITDATE   PRIMARY RX               DOSE      CrCl  1/10/2018   COUMADIN                 5mg  12/19/2017  COUMADIN                 5mg  11/28/2017  COUMADIN                 5mg  11/8/2017   COUMADIN                 5mg  OTHER CURRENT MEDICATIONS:  COUMADIN  PROGRESS NOTES: PER DR PEREIRA SAME DOSE RECHECK 2 WEEKS, PT AWARE NO CALL NEEDED  WHEN INR WITHIN RANGE AND CHECKS EVERY 2 WEEKS  PATIENT INSTRUCTIONS: SEE PROGRESS NOTE  TEST LOCATION: Home Testing, ,           ,             INBOUND LAB DATA:  Lab       Lab Value Col Date                 Rpt Date                 Lab  Reference Range  Electronically signed by: Lesa Barbour on 1/11/2018 11:39 AM EST

## 2018-03-08 ENCOUNTER — TELEPHONE (OUTPATIENT)
Dept: CARDIOLOGY CLINIC | Facility: CLINIC | Age: 73
End: 2018-03-08

## 2018-03-12 ENCOUNTER — TELEPHONE (OUTPATIENT)
Dept: CARDIOLOGY CLINIC | Facility: CLINIC | Age: 73
End: 2018-03-12

## 2018-03-12 LAB — INR PPP: 1.6 (ref 0.86–1.16)

## 2018-03-13 NOTE — TELEPHONE ENCOUNTER
Left detailed message for patient   Advised per Se6  10mg tonight  Than 7 5mg QD (same dose)  Recheck 10 days    Asked to call back if any questions

## 2018-03-22 ENCOUNTER — APPOINTMENT (EMERGENCY)
Dept: CT IMAGING | Facility: HOSPITAL | Age: 73
DRG: 305 | End: 2018-03-22
Payer: MEDICARE

## 2018-03-22 ENCOUNTER — APPOINTMENT (INPATIENT)
Dept: ULTRASOUND IMAGING | Facility: HOSPITAL | Age: 73
DRG: 305 | End: 2018-03-22
Payer: MEDICARE

## 2018-03-22 ENCOUNTER — HOSPITAL ENCOUNTER (INPATIENT)
Facility: HOSPITAL | Age: 73
LOS: 5 days | Discharge: HOME HEALTH | DRG: 305 | End: 2018-03-27
Attending: INTERNAL MEDICINE | Admitting: INTERNAL MEDICINE
Payer: MEDICARE

## 2018-03-22 DIAGNOSIS — E78.5 HYPERLIPIDEMIA: Primary | ICD-10-CM

## 2018-03-22 DIAGNOSIS — I16.9 HYPERTENSIVE CRISIS: ICD-10-CM

## 2018-03-22 DIAGNOSIS — N30.00 ACUTE CYSTITIS WITHOUT HEMATURIA: ICD-10-CM

## 2018-03-22 DIAGNOSIS — R51.9 HEADACHE: ICD-10-CM

## 2018-03-22 DIAGNOSIS — E87.6 HYPOKALEMIA: Primary | ICD-10-CM

## 2018-03-22 DIAGNOSIS — I25.10 CORONARY ARTERIOSCLEROSIS: ICD-10-CM

## 2018-03-22 DIAGNOSIS — D68.51 FACTOR V LEIDEN MUTATION (HCC): ICD-10-CM

## 2018-03-22 DIAGNOSIS — I16.9 HYPERTENSIVE CRISIS, UNSPECIFIED: ICD-10-CM

## 2018-03-22 DIAGNOSIS — I10 ESSENTIAL HYPERTENSION: ICD-10-CM

## 2018-03-22 PROBLEM — J44.9 COPD (CHRONIC OBSTRUCTIVE PULMONARY DISEASE) (HCC): Status: ACTIVE | Noted: 2018-03-22

## 2018-03-22 PROBLEM — I48.91 ATRIAL FIBRILLATION (HCC): Chronic | Status: ACTIVE | Noted: 2018-03-22

## 2018-03-22 LAB
ALBUMIN SERPL BCP-MCNC: 3.5 G/DL (ref 3.5–5)
ALP SERPL-CCNC: 49 U/L (ref 46–116)
ALT SERPL W P-5'-P-CCNC: 58 U/L (ref 12–78)
ANION GAP SERPL CALCULATED.3IONS-SCNC: 10 MMOL/L (ref 4–13)
APTT PPP: 37 SECONDS (ref 23–35)
APTT PPP: 37 SECONDS (ref 23–35)
AST SERPL W P-5'-P-CCNC: 37 U/L (ref 5–45)
ATRIAL RATE: 62 BPM
BACTERIA UR QL AUTO: ABNORMAL /HPF
BASOPHILS # BLD AUTO: 0.06 THOUSANDS/ΜL (ref 0–0.1)
BASOPHILS NFR BLD AUTO: 1 % (ref 0–1)
BILIRUB SERPL-MCNC: 0.5 MG/DL (ref 0.2–1)
BILIRUB UR QL STRIP: NEGATIVE
BUN SERPL-MCNC: 21 MG/DL (ref 5–25)
CALCIUM SERPL-MCNC: 9.4 MG/DL (ref 8.3–10.1)
CHLORIDE SERPL-SCNC: 101 MMOL/L (ref 100–108)
CLARITY UR: CLEAR
CO2 SERPL-SCNC: 29 MMOL/L (ref 21–32)
COLOR UR: YELLOW
CREAT SERPL-MCNC: 0.87 MG/DL (ref 0.6–1.3)
EOSINOPHIL # BLD AUTO: 0.17 THOUSAND/ΜL (ref 0–0.61)
EOSINOPHIL NFR BLD AUTO: 2 % (ref 0–6)
ERYTHROCYTE [DISTWIDTH] IN BLOOD BY AUTOMATED COUNT: 12.6 % (ref 11.6–15.1)
ERYTHROCYTE [DISTWIDTH] IN BLOOD BY AUTOMATED COUNT: 12.6 % (ref 11.6–15.1)
ERYTHROCYTE [SEDIMENTATION RATE] IN BLOOD: 53 MM/HOUR (ref 0–20)
GFR SERPL CREATININE-BSD FRML MDRD: 67 ML/MIN/1.73SQ M
GLUCOSE SERPL-MCNC: 127 MG/DL (ref 65–140)
GLUCOSE SERPL-MCNC: 164 MG/DL (ref 65–140)
GLUCOSE UR STRIP-MCNC: NEGATIVE MG/DL
HCT VFR BLD AUTO: 36.6 % (ref 34.8–46.1)
HCT VFR BLD AUTO: 39.6 % (ref 34.8–46.1)
HGB BLD-MCNC: 12.3 G/DL (ref 11.5–15.4)
HGB BLD-MCNC: 13.1 G/DL (ref 11.5–15.4)
HGB UR QL STRIP.AUTO: ABNORMAL
INR PPP: 1.65 (ref 0.86–1.16)
INR PPP: 1.66 (ref 0.86–1.16)
KETONES UR STRIP-MCNC: NEGATIVE MG/DL
LEUKOCYTE ESTERASE UR QL STRIP: NEGATIVE
LYMPHOCYTES # BLD AUTO: 2.19 THOUSANDS/ΜL (ref 0.6–4.47)
LYMPHOCYTES NFR BLD AUTO: 26 % (ref 14–44)
MCH RBC QN AUTO: 29 PG (ref 26.8–34.3)
MCH RBC QN AUTO: 29.2 PG (ref 26.8–34.3)
MCHC RBC AUTO-ENTMCNC: 33.1 G/DL (ref 31.4–37.4)
MCHC RBC AUTO-ENTMCNC: 33.6 G/DL (ref 31.4–37.4)
MCV RBC AUTO: 87 FL (ref 82–98)
MCV RBC AUTO: 88 FL (ref 82–98)
MONOCYTES # BLD AUTO: 0.75 THOUSAND/ΜL (ref 0.17–1.22)
MONOCYTES NFR BLD AUTO: 9 % (ref 4–12)
NEUTROPHILS # BLD AUTO: 5.08 THOUSANDS/ΜL (ref 1.85–7.62)
NEUTS SEG NFR BLD AUTO: 61 % (ref 43–75)
NITRITE UR QL STRIP: POSITIVE
NON-SQ EPI CELLS URNS QL MICRO: ABNORMAL /HPF
NRBC BLD AUTO-RTO: 0 /100 WBCS
PH UR STRIP.AUTO: 6 [PH] (ref 4.5–8)
PLATELET # BLD AUTO: 248 THOUSANDS/UL (ref 149–390)
PLATELET # BLD AUTO: 252 THOUSANDS/UL (ref 149–390)
PMV BLD AUTO: 9.9 FL (ref 8.9–12.7)
PMV BLD AUTO: 9.9 FL (ref 8.9–12.7)
POTASSIUM SERPL-SCNC: 4.3 MMOL/L (ref 3.5–5.3)
PROT SERPL-MCNC: 8.5 G/DL (ref 6.4–8.2)
PROT UR STRIP-MCNC: ABNORMAL MG/DL
PROTHROMBIN TIME: 19.9 SECONDS (ref 12.1–14.4)
PROTHROMBIN TIME: 20.1 SECONDS (ref 12.1–14.4)
QRS AXIS: 54 DEGREES
QRSD INTERVAL: 88 MS
QT INTERVAL: 440 MS
QTC INTERVAL: 446 MS
RBC # BLD AUTO: 4.21 MILLION/UL (ref 3.81–5.12)
RBC # BLD AUTO: 4.51 MILLION/UL (ref 3.81–5.12)
RBC #/AREA URNS AUTO: ABNORMAL /HPF
SODIUM SERPL-SCNC: 140 MMOL/L (ref 136–145)
SP GR UR STRIP.AUTO: 1.01 (ref 1–1.03)
T WAVE AXIS: 37 DEGREES
TROPONIN I SERPL-MCNC: <0.02 NG/ML
TSH SERPL DL<=0.05 MIU/L-ACNC: 0.62 UIU/ML (ref 0.36–3.74)
UROBILINOGEN UR QL STRIP.AUTO: 0.2 E.U./DL
VENTRICULAR RATE: 62 BPM
WBC # BLD AUTO: 8.28 THOUSAND/UL (ref 4.31–10.16)
WBC # BLD AUTO: 8.64 THOUSAND/UL (ref 4.31–10.16)
WBC #/AREA URNS AUTO: ABNORMAL /HPF

## 2018-03-22 PROCEDURE — 93005 ELECTROCARDIOGRAM TRACING: CPT | Performed by: PHYSICIAN ASSISTANT

## 2018-03-22 PROCEDURE — 80053 COMPREHEN METABOLIC PANEL: CPT | Performed by: PHYSICIAN ASSISTANT

## 2018-03-22 PROCEDURE — 81001 URINALYSIS AUTO W/SCOPE: CPT | Performed by: NURSE PRACTITIONER

## 2018-03-22 PROCEDURE — 87476 LYME DIS DNA AMP PROBE: CPT | Performed by: NURSE PRACTITIONER

## 2018-03-22 PROCEDURE — 85610 PROTHROMBIN TIME: CPT | Performed by: PHYSICIAN ASSISTANT

## 2018-03-22 PROCEDURE — 85730 THROMBOPLASTIN TIME PARTIAL: CPT | Performed by: PHYSICIAN ASSISTANT

## 2018-03-22 PROCEDURE — 85027 COMPLETE CBC AUTOMATED: CPT | Performed by: NURSE PRACTITIONER

## 2018-03-22 PROCEDURE — 82948 REAGENT STRIP/BLOOD GLUCOSE: CPT

## 2018-03-22 PROCEDURE — 99285 EMERGENCY DEPT VISIT HI MDM: CPT

## 2018-03-22 PROCEDURE — 99222 1ST HOSP IP/OBS MODERATE 55: CPT | Performed by: PSYCHIATRY & NEUROLOGY

## 2018-03-22 PROCEDURE — 85730 THROMBOPLASTIN TIME PARTIAL: CPT | Performed by: NURSE PRACTITIONER

## 2018-03-22 PROCEDURE — 84443 ASSAY THYROID STIM HORMONE: CPT | Performed by: NURSE PRACTITIONER

## 2018-03-22 PROCEDURE — 84484 ASSAY OF TROPONIN QUANT: CPT | Performed by: PHYSICIAN ASSISTANT

## 2018-03-22 PROCEDURE — 85025 COMPLETE CBC W/AUTO DIFF WBC: CPT | Performed by: PHYSICIAN ASSISTANT

## 2018-03-22 PROCEDURE — 85610 PROTHROMBIN TIME: CPT | Performed by: NURSE PRACTITIONER

## 2018-03-22 PROCEDURE — 93010 ELECTROCARDIOGRAM REPORT: CPT | Performed by: INTERNAL MEDICINE

## 2018-03-22 PROCEDURE — 85652 RBC SED RATE AUTOMATED: CPT | Performed by: NURSE PRACTITIONER

## 2018-03-22 PROCEDURE — 84484 ASSAY OF TROPONIN QUANT: CPT | Performed by: NURSE PRACTITIONER

## 2018-03-22 PROCEDURE — 70450 CT HEAD/BRAIN W/O DYE: CPT

## 2018-03-22 PROCEDURE — 36415 COLL VENOUS BLD VENIPUNCTURE: CPT | Performed by: PHYSICIAN ASSISTANT

## 2018-03-22 PROCEDURE — 86038 ANTINUCLEAR ANTIBODIES: CPT | Performed by: NURSE PRACTITIONER

## 2018-03-22 RX ORDER — PRAVASTATIN SODIUM 40 MG
40 TABLET ORAL
Status: DISCONTINUED | OUTPATIENT
Start: 2018-03-22 | End: 2018-03-27 | Stop reason: HOSPADM

## 2018-03-22 RX ORDER — LORATADINE 10 MG/1
10 TABLET ORAL DAILY
Status: DISCONTINUED | OUTPATIENT
Start: 2018-03-22 | End: 2018-03-27 | Stop reason: HOSPADM

## 2018-03-22 RX ORDER — POTASSIUM CHLORIDE 20 MEQ/1
20 TABLET, EXTENDED RELEASE ORAL 2 TIMES DAILY
Qty: 180 TABLET | Refills: 0 | Status: SHIPPED | OUTPATIENT
Start: 2018-03-22 | End: 2018-06-27 | Stop reason: SDUPTHER

## 2018-03-22 RX ORDER — HEPARIN SODIUM 1000 [USP'U]/ML
2000 INJECTION, SOLUTION INTRAVENOUS; SUBCUTANEOUS AS NEEDED
Status: DISCONTINUED | OUTPATIENT
Start: 2018-03-22 | End: 2018-03-26

## 2018-03-22 RX ORDER — AMLODIPINE BESYLATE 5 MG/1
10 TABLET ORAL DAILY
Status: DISCONTINUED | OUTPATIENT
Start: 2018-03-23 | End: 2018-03-23

## 2018-03-22 RX ORDER — CHLORHEXIDINE GLUCONATE 0.12 MG/ML
15 RINSE ORAL EVERY 12 HOURS SCHEDULED
Status: DISCONTINUED | OUTPATIENT
Start: 2018-03-22 | End: 2018-03-27 | Stop reason: HOSPADM

## 2018-03-22 RX ORDER — FUROSEMIDE 40 MG/1
40 TABLET ORAL DAILY
Status: DISCONTINUED | OUTPATIENT
Start: 2018-03-22 | End: 2018-03-27 | Stop reason: HOSPADM

## 2018-03-22 RX ORDER — LIDOCAINE HYDROCHLORIDE 10 MG/ML
INJECTION, SOLUTION EPIDURAL; INFILTRATION; INTRACAUDAL; PERINEURAL
Status: COMPLETED
Start: 2018-03-22 | End: 2018-03-22

## 2018-03-22 RX ORDER — NADOLOL 40 MG/1
40 TABLET ORAL DAILY
Status: DISCONTINUED | OUTPATIENT
Start: 2018-03-22 | End: 2018-03-23

## 2018-03-22 RX ORDER — LISINOPRIL 5 MG/1
5 TABLET ORAL DAILY
Status: DISCONTINUED | OUTPATIENT
Start: 2018-03-23 | End: 2018-03-22

## 2018-03-22 RX ORDER — ACETAMINOPHEN 325 MG/1
650 TABLET ORAL EVERY 6 HOURS PRN
Status: DISCONTINUED | OUTPATIENT
Start: 2018-03-22 | End: 2018-03-27 | Stop reason: HOSPADM

## 2018-03-22 RX ORDER — LISINOPRIL 5 MG/1
5 TABLET ORAL DAILY
Status: DISCONTINUED | OUTPATIENT
Start: 2018-03-22 | End: 2018-03-27 | Stop reason: HOSPADM

## 2018-03-22 RX ORDER — HEPARIN SODIUM 10000 [USP'U]/100ML
3-20 INJECTION, SOLUTION INTRAVENOUS
Status: DISCONTINUED | OUTPATIENT
Start: 2018-03-22 | End: 2018-03-26

## 2018-03-22 RX ORDER — AMLODIPINE BESYLATE 5 MG/1
5 TABLET ORAL ONCE
Status: COMPLETED | OUTPATIENT
Start: 2018-03-22 | End: 2018-03-22

## 2018-03-22 RX ORDER — WARFARIN SODIUM 5 MG/1
5 TABLET ORAL
Status: COMPLETED | OUTPATIENT
Start: 2018-03-22 | End: 2018-03-22

## 2018-03-22 RX ORDER — HEPARIN SODIUM 5000 [USP'U]/ML
5000 INJECTION, SOLUTION INTRAVENOUS; SUBCUTANEOUS EVERY 8 HOURS SCHEDULED
Status: DISCONTINUED | OUTPATIENT
Start: 2018-03-22 | End: 2018-03-22

## 2018-03-22 RX ORDER — HEPARIN SODIUM 1000 [USP'U]/ML
4000 INJECTION, SOLUTION INTRAVENOUS; SUBCUTANEOUS AS NEEDED
Status: DISCONTINUED | OUTPATIENT
Start: 2018-03-22 | End: 2018-03-26

## 2018-03-22 RX ORDER — HYDRALAZINE HYDROCHLORIDE 20 MG/ML
20 INJECTION INTRAMUSCULAR; INTRAVENOUS EVERY 6 HOURS PRN
Status: DISCONTINUED | OUTPATIENT
Start: 2018-03-22 | End: 2018-03-27 | Stop reason: HOSPADM

## 2018-03-22 RX ORDER — HYDRALAZINE HYDROCHLORIDE 20 MG/ML
10 INJECTION INTRAMUSCULAR; INTRAVENOUS ONCE
Status: COMPLETED | OUTPATIENT
Start: 2018-03-22 | End: 2018-03-22

## 2018-03-22 RX ORDER — DOCUSATE SODIUM 100 MG/1
100 CAPSULE, LIQUID FILLED ORAL 2 TIMES DAILY
Status: DISCONTINUED | OUTPATIENT
Start: 2018-03-22 | End: 2018-03-27 | Stop reason: HOSPADM

## 2018-03-22 RX ORDER — PANTOPRAZOLE SODIUM 20 MG/1
20 TABLET, DELAYED RELEASE ORAL
Status: DISCONTINUED | OUTPATIENT
Start: 2018-03-22 | End: 2018-03-27 | Stop reason: HOSPADM

## 2018-03-22 RX ORDER — SENNOSIDES 8.6 MG
1 TABLET ORAL
Status: DISCONTINUED | OUTPATIENT
Start: 2018-03-22 | End: 2018-03-27 | Stop reason: HOSPADM

## 2018-03-22 RX ORDER — LABETALOL HYDROCHLORIDE 5 MG/ML
10 INJECTION, SOLUTION INTRAVENOUS ONCE
Status: COMPLETED | OUTPATIENT
Start: 2018-03-22 | End: 2018-03-22

## 2018-03-22 RX ADMIN — HEPARIN SODIUM 11.1 UNITS/KG/HR: 10000 INJECTION, SOLUTION INTRAVENOUS at 16:50

## 2018-03-22 RX ADMIN — PRAVASTATIN SODIUM 40 MG: 40 TABLET ORAL at 16:49

## 2018-03-22 RX ADMIN — HYDRALAZINE HYDROCHLORIDE 10 MG: 20 INJECTION INTRAMUSCULAR; INTRAVENOUS at 14:31

## 2018-03-22 RX ADMIN — LISINOPRIL 5 MG: 5 TABLET ORAL at 20:30

## 2018-03-22 RX ADMIN — LIDOCAINE HYDROCHLORIDE 20 MG: 10 INJECTION, SOLUTION EPIDURAL; INFILTRATION; INTRACAUDAL; PERINEURAL at 16:45

## 2018-03-22 RX ADMIN — ACETAMINOPHEN 650 MG: 325 TABLET, FILM COATED ORAL at 22:30

## 2018-03-22 RX ADMIN — LABETALOL 20 MG/4 ML (5 MG/ML) INTRAVENOUS SYRINGE 10 MG: at 15:57

## 2018-03-22 RX ADMIN — WARFARIN SODIUM 5 MG: 5 TABLET ORAL at 19:25

## 2018-03-22 RX ADMIN — CHLORHEXIDINE GLUCONATE 15 ML: 1.2 RINSE ORAL at 20:41

## 2018-03-22 RX ADMIN — AMLODIPINE BESYLATE 5 MG: 5 TABLET ORAL at 21:51

## 2018-03-22 RX ADMIN — SODIUM CHLORIDE 5 MG/HR: 0.9 INJECTION, SOLUTION INTRAVENOUS at 17:57

## 2018-03-22 NOTE — PROCEDURES
Arterial Line Insertion  Date/Time: 3/22/2018 6:58 PM  Performed by: Ousmane Barber  Authorized by: Ousmane Barber     Patient location:  Bedside  Consent:     Consent obtained:  Emergent situation    Consent given by:  Patient    Risks discussed:  Bleeding, ischemia, repeat procedure, infection and pain  Universal protocol:     Procedure explained and questions answered to patient or proxy's satisfaction: yes      Relevant documents present and verified: yes      Test results available and properly labeled: yes      Imaging studies available: yes      Required blood products, implants, devices, and special equipment available: yes      Site/side marked: yes      Immediately prior to procedure a time out was called: yes      Patient identity confirmed:  Hospital-assigned identification number and verbally with patient  Indications:     Indications: hemodynamic monitoring, continuous blood pressure monitoring and frequent labs / infusion    Pre-procedure details:     Skin preparation:  Chlorhexidine    Preparation: Patient was prepped and draped in sterile fashion    Anesthesia (see MAR for exact dosages): Anesthesia method:  Local infiltration    Local anesthetic:  Lidocaine 2% w/o epi  Procedure details:     Location / Tip of Catheter:  Radial    Laterality:  Right    Edin's test performed: yes      Edin's test abnormal: no      Needle gauge:  20 G    Placement technique:  Percutaneous    Number of attempts:  2    Successful placement: yes      Transducer: waveform confirmed    Post-procedure details:     Post-procedure:  Biopatch applied, secured with tape, sterile dressing applied and sutured    CMS:  Normal    Patient tolerance of procedure:   Tolerated well, no immediate complications

## 2018-03-22 NOTE — ED PROVIDER NOTES
History  Chief Complaint   Patient presents with    Hypertension     pts primary care  sent her to be checked b/c of hypertension  pt has been being seen for pain in her eye for the past few days  had a CT scan two days ago     71 y o  Female presents with her family for evaluation of left eye pain, headache, higher than normal blood pressure  Family notes pt  Has been c o  Eye pain and left sided headache for several days and has not been quite herself for approximately 1 week  They note she has been less talkative, not always making sense, and "out of it" at times  She denies V/D, SOB, Cp, palpitations, fevers, sick contacts recent falls; Pt  Currently on coumadin, Lasix 40mg po qod, CORGARD, Lisinopril and Crestor managed by cardiology she states last INR was 2-3 days ago and was 2 2  Follows with PCP and cardiologist regularly  Family reports pt  Had outpt CT scan 2 days ago, never received official results  Prior to Admission Medications   Prescriptions Last Dose Informant Patient Reported? Taking?    Biotin 10 MG TABS   Yes No   Sig: Take by mouth   Omega-3 Fatty Acids (FISH OIL) 645 MG CAPS   Yes No   Sig: Take 1 capsule by mouth daily   Omeprazole 20 MG TBEC   Yes No   Sig: Take 1 capsule by mouth 2 (two) times a day   cetirizine (ZYRTEC ALLERGY) 10 mg tablet   Yes No   Sig: Take 1 tablet by mouth daily   furosemide (LASIX) 40 mg tablet   Yes No   Sig: Take 1 tablet by mouth daily   glimepiride (AMARYL) 2 mg tablet   Yes No   Sig: Take 1 tablet by mouth daily   lisinopril (ZESTRIL) 2 5 mg tablet   Yes No   Sig: Take 1 tablet by mouth daily   metFORMIN (GLUCOPHAGE) 1000 MG tablet   Yes No   Sig: Take 1 tablet by mouth 2 (two) times a day   multivitamin (THERAGRAN) TABS   Yes No   Sig: Take 1 tablet by mouth daily   nadolol (CORGARD) 40 mg tablet   Yes No   Sig: Take by mouth   potassium chloride (K-DUR,KLOR-CON) 20 mEq tablet   No No   Sig: Take 1 tablet (20 mEq total) by mouth 2 (two) times a day   rosuvastatin (CRESTOR) 10 MG tablet   Yes No   Sig: Take 0 5 tablets by mouth daily   warfarin (COUMADIN) 10 mg tablet   Yes No   Sig: Take 1 tablet by mouth daily   warfarin (COUMADIN) 5 mg tablet   Yes No   Sig: Take 1 tablet by mouth daily   warfarin (COUMADIN) 7 5 mg tablet   No No   Sig: Take 1 tab daily or as directed      Facility-Administered Medications: None       Past Medical History:   Diagnosis Date    Aortic ejection murmur     AS (aortic stenosis)     Atrial fibrillation (HCC)     COPD (chronic obstructive pulmonary disease) (HCC)     Diabetes mellitus (HCC)     Exertional shortness of breath     HTN (hypertension)     Hyperlipidemia     Obesity        Past Surgical History:   Procedure Laterality Date    ADENOIDECTOMY      CARDIAC CATHETERIZATION      HYSTERECTOMY      KNEE SURGERY      ROTATOR CUFF REPAIR      TONSILLECTOMY         Family History   Problem Relation Age of Onset    COPD Mother      I have reviewed and agree with the history as documented  Social History   Substance Use Topics    Smoking status: Never Smoker    Smokeless tobacco: Never Used    Alcohol use No        Review of Systems   Eyes: Positive for pain  Cardiovascular: Positive for leg swelling  Negative for chest pain and palpitations  Gastrointestinal: Positive for nausea  Neurological: Positive for headaches  Negative for dizziness, tremors, seizures, syncope, facial asymmetry, speech difficulty, weakness, light-headedness and numbness  All other systems reviewed and are negative        Physical Exam  ED Triage Vitals   Temperature Pulse Respirations Blood Pressure SpO2   03/22/18 1125 03/22/18 1125 03/22/18 1125 03/22/18 1128 03/22/18 1125   97 8 °F (36 6 °C) 69 16 (!) 224/95 97 %      Temp Source Heart Rate Source Patient Position - Orthostatic VS BP Location FiO2 (%)   03/22/18 1125 03/22/18 1125 03/22/18 1128 03/22/18 1128 --   Oral Monitor Sitting Right arm       Pain Score 03/22/18 1125       Worst Possible Pain           Orthostatic Vital Signs  Vitals:    03/22/18 1125 03/22/18 1128   BP:  (!) 224/95   Pulse: 69    Patient Position - Orthostatic VS:  Sitting       Physical Exam   Constitutional: She is oriented to person, place, and time  She appears well-developed and well-nourished  HENT:   Head: Normocephalic and atraumatic  Right Ear: External ear normal    Left Ear: External ear normal    Eyes: Conjunctivae, EOM and lids are normal  Pupils are equal, round, and reactive to light    + photophobia, left eye   Neck: Normal range of motion  Neck supple  Cardiovascular: Normal rate, regular rhythm and intact distal pulses  1+ pedal edema   Pulmonary/Chest: Effort normal and breath sounds normal    Abdominal: Soft  Bowel sounds are normal    Musculoskeletal: Normal range of motion  Neurological: She is alert and oriented to person, place, and time  Skin: Skin is warm  Capillary refill takes less than 2 seconds  Psychiatric: She has a normal mood and affect  Her behavior is normal    Nursing note and vitals reviewed  ED Medications  Medications - No data to display    Diagnostic Studies  Results Reviewed     None                 No orders to display              Procedures  Procedures       Phone Contacts  ED Phone Contact    ED Course  ED Course                                MDM  Number of Diagnoses or Management Options  Headache: new and requires workup  Hypertensive crisis, unspecified: new and requires workup  Diagnosis management comments: Pt  Is A&Ox3, HYPERTENSIVE, -SOB,CP, Dizziness, PERRLA, + photophobia, +EOMI, NVI, no assymmetry of face, smile, no tongue deviation, LS CTA b/l, RRR, abd soft NT/ND, Bsx4; Cap refill <2seconds, 1 + pedal edema, 2+ pedal pulses  Spoke with Dr Linda Dejesus pt still with SBP in the 220's s/p 10mg Hydralazine asked to contact critical care for admission with them  Spoke with Dr Mary Oreilly will accept  Patient for admission  CritCare Time    Disposition  Final diagnoses:   None     ED Disposition     None      Follow-up Information    None       Patient's Medications   Discharge Prescriptions    No medications on file     No discharge procedures on file      ED Provider  Electronically Signed by           Elmer Grimes PA-C  03/22/18 1753
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
## 2018-03-22 NOTE — ED NOTES
pts family to bring in pts home cpap machine for hs     Erika Germain, [address] [facility]  03/22/18 0142

## 2018-03-22 NOTE — PLAN OF CARE
CARDIOVASCULAR - ADULT     Maintains optimal cardiac output and hemodynamic stability Progressing        Potential for Falls     Patient will remain free of falls Progressing

## 2018-03-23 ENCOUNTER — APPOINTMENT (INPATIENT)
Dept: ULTRASOUND IMAGING | Facility: HOSPITAL | Age: 73
DRG: 305 | End: 2018-03-23
Payer: MEDICARE

## 2018-03-23 ENCOUNTER — APPOINTMENT (INPATIENT)
Dept: MRI IMAGING | Facility: HOSPITAL | Age: 73
DRG: 305 | End: 2018-03-23
Payer: MEDICARE

## 2018-03-23 PROBLEM — E11.9 DIABETES (HCC): Status: ACTIVE | Noted: 2018-03-23

## 2018-03-23 LAB
ANION GAP SERPL CALCULATED.3IONS-SCNC: 10 MMOL/L (ref 4–13)
APTT PPP: 41 SECONDS (ref 23–35)
APTT PPP: 54 SECONDS (ref 23–35)
APTT PPP: 75 SECONDS (ref 23–35)
BASOPHILS # BLD AUTO: 0.04 THOUSANDS/ΜL (ref 0–0.1)
BASOPHILS NFR BLD AUTO: 1 % (ref 0–1)
BUN SERPL-MCNC: 19 MG/DL (ref 5–25)
CALCIUM SERPL-MCNC: 9.1 MG/DL (ref 8.3–10.1)
CHLORIDE SERPL-SCNC: 102 MMOL/L (ref 100–108)
CHOLEST SERPL-MCNC: 169 MG/DL (ref 50–200)
CO2 SERPL-SCNC: 27 MMOL/L (ref 21–32)
CREAT SERPL-MCNC: 0.75 MG/DL (ref 0.6–1.3)
EOSINOPHIL # BLD AUTO: 0.16 THOUSAND/ΜL (ref 0–0.61)
EOSINOPHIL NFR BLD AUTO: 2 % (ref 0–6)
ERYTHROCYTE [DISTWIDTH] IN BLOOD BY AUTOMATED COUNT: 12.6 % (ref 11.6–15.1)
GFR SERPL CREATININE-BSD FRML MDRD: 80 ML/MIN/1.73SQ M
GLUCOSE SERPL-MCNC: 116 MG/DL (ref 65–140)
GLUCOSE SERPL-MCNC: 133 MG/DL (ref 65–140)
GLUCOSE SERPL-MCNC: 137 MG/DL (ref 65–140)
GLUCOSE SERPL-MCNC: 172 MG/DL (ref 65–140)
GLUCOSE SERPL-MCNC: 310 MG/DL (ref 65–140)
HCT VFR BLD AUTO: 35.4 % (ref 34.8–46.1)
HDLC SERPL-MCNC: 41 MG/DL (ref 40–60)
HGB BLD-MCNC: 11.7 G/DL (ref 11.5–15.4)
INR PPP: 1.63 (ref 0.86–1.16)
LDLC SERPL CALC-MCNC: 87 MG/DL (ref 0–100)
LYMPHOCYTES # BLD AUTO: 2.52 THOUSANDS/ΜL (ref 0.6–4.47)
LYMPHOCYTES NFR BLD AUTO: 31 % (ref 14–44)
MAGNESIUM SERPL-MCNC: 1.3 MG/DL (ref 1.6–2.6)
MCH RBC QN AUTO: 29 PG (ref 26.8–34.3)
MCHC RBC AUTO-ENTMCNC: 33.1 G/DL (ref 31.4–37.4)
MCV RBC AUTO: 88 FL (ref 82–98)
MONOCYTES # BLD AUTO: 0.81 THOUSAND/ΜL (ref 0.17–1.22)
MONOCYTES NFR BLD AUTO: 10 % (ref 4–12)
NEUTROPHILS # BLD AUTO: 4.55 THOUSANDS/ΜL (ref 1.85–7.62)
NEUTS SEG NFR BLD AUTO: 56 % (ref 43–75)
NRBC BLD AUTO-RTO: 0 /100 WBCS
PHOSPHATE SERPL-MCNC: 4.3 MG/DL (ref 2.3–4.1)
PLATELET # BLD AUTO: 223 THOUSANDS/UL (ref 149–390)
PMV BLD AUTO: 9.6 FL (ref 8.9–12.7)
POTASSIUM SERPL-SCNC: 3.7 MMOL/L (ref 3.5–5.3)
PROTHROMBIN TIME: 19.8 SECONDS (ref 12.1–14.4)
RBC # BLD AUTO: 4.04 MILLION/UL (ref 3.81–5.12)
RYE IGE QN: NEGATIVE
SODIUM SERPL-SCNC: 139 MMOL/L (ref 136–145)
TRIGL SERPL-MCNC: 204 MG/DL
WBC # BLD AUTO: 8.1 THOUSAND/UL (ref 4.31–10.16)

## 2018-03-23 PROCEDURE — 85730 THROMBOPLASTIN TIME PARTIAL: CPT | Performed by: PHYSICIAN ASSISTANT

## 2018-03-23 PROCEDURE — 83735 ASSAY OF MAGNESIUM: CPT | Performed by: PHYSICIAN ASSISTANT

## 2018-03-23 PROCEDURE — 85730 THROMBOPLASTIN TIME PARTIAL: CPT | Performed by: INTERNAL MEDICINE

## 2018-03-23 PROCEDURE — 70551 MRI BRAIN STEM W/O DYE: CPT

## 2018-03-23 PROCEDURE — 82948 REAGENT STRIP/BLOOD GLUCOSE: CPT

## 2018-03-23 PROCEDURE — 80061 LIPID PANEL: CPT | Performed by: INTERNAL MEDICINE

## 2018-03-23 PROCEDURE — 85025 COMPLETE CBC W/AUTO DIFF WBC: CPT | Performed by: PHYSICIAN ASSISTANT

## 2018-03-23 PROCEDURE — 85730 THROMBOPLASTIN TIME PARTIAL: CPT | Performed by: NURSE PRACTITIONER

## 2018-03-23 PROCEDURE — 84100 ASSAY OF PHOSPHORUS: CPT | Performed by: PHYSICIAN ASSISTANT

## 2018-03-23 PROCEDURE — 80048 BASIC METABOLIC PNL TOTAL CA: CPT | Performed by: PHYSICIAN ASSISTANT

## 2018-03-23 PROCEDURE — 99222 1ST HOSP IP/OBS MODERATE 55: CPT | Performed by: INTERNAL MEDICINE

## 2018-03-23 PROCEDURE — 93975 VASCULAR STUDY: CPT | Performed by: SURGERY

## 2018-03-23 PROCEDURE — 93975 VASCULAR STUDY: CPT

## 2018-03-23 PROCEDURE — 85610 PROTHROMBIN TIME: CPT | Performed by: PHYSICIAN ASSISTANT

## 2018-03-23 PROCEDURE — 99232 SBSQ HOSP IP/OBS MODERATE 35: CPT | Performed by: PSYCHIATRY & NEUROLOGY

## 2018-03-23 RX ORDER — AMLODIPINE BESYLATE 5 MG/1
5 TABLET ORAL DAILY
Status: DISCONTINUED | OUTPATIENT
Start: 2018-03-23 | End: 2018-03-25

## 2018-03-23 RX ORDER — MAGNESIUM SULFATE HEPTAHYDRATE 40 MG/ML
2 INJECTION, SOLUTION INTRAVENOUS
Status: DISCONTINUED | OUTPATIENT
Start: 2018-03-23 | End: 2018-03-23

## 2018-03-23 RX ORDER — ALPRAZOLAM 0.5 MG/1
0.5 TABLET ORAL ONCE
Status: COMPLETED | OUTPATIENT
Start: 2018-03-23 | End: 2018-03-23

## 2018-03-23 RX ORDER — CARVEDILOL 12.5 MG/1
12.5 TABLET ORAL 2 TIMES DAILY WITH MEALS
Status: DISCONTINUED | OUTPATIENT
Start: 2018-03-23 | End: 2018-03-27 | Stop reason: HOSPADM

## 2018-03-23 RX ORDER — POTASSIUM CHLORIDE 20 MEQ/1
40 TABLET, EXTENDED RELEASE ORAL ONCE
Status: COMPLETED | OUTPATIENT
Start: 2018-03-23 | End: 2018-03-23

## 2018-03-23 RX ADMIN — INSULIN LISPRO 5 UNITS: 100 INJECTION, SOLUTION INTRAVENOUS; SUBCUTANEOUS at 18:16

## 2018-03-23 RX ADMIN — NADOLOL 40 MG: 40 TABLET ORAL at 08:50

## 2018-03-23 RX ADMIN — LISINOPRIL 5 MG: 5 TABLET ORAL at 08:49

## 2018-03-23 RX ADMIN — PANTOPRAZOLE SODIUM 20 MG: 20 TABLET, DELAYED RELEASE ORAL at 08:00

## 2018-03-23 RX ADMIN — HEPARIN SODIUM 2000 UNITS: 1000 INJECTION, SOLUTION INTRAVENOUS; SUBCUTANEOUS at 02:03

## 2018-03-23 RX ADMIN — POTASSIUM CHLORIDE 40 MEQ: 1500 TABLET, EXTENDED RELEASE ORAL at 08:53

## 2018-03-23 RX ADMIN — AMLODIPINE BESYLATE 5 MG: 5 TABLET ORAL at 08:50

## 2018-03-23 RX ADMIN — CARVEDILOL 12.5 MG: 12.5 TABLET, FILM COATED ORAL at 15:56

## 2018-03-23 RX ADMIN — LORATADINE 10 MG: 10 TABLET ORAL at 08:50

## 2018-03-23 RX ADMIN — PRAVASTATIN SODIUM 40 MG: 40 TABLET ORAL at 15:56

## 2018-03-23 RX ADMIN — PANTOPRAZOLE SODIUM 20 MG: 20 TABLET, DELAYED RELEASE ORAL at 15:09

## 2018-03-23 RX ADMIN — HEPARIN SODIUM 13.1 UNITS/KG/HR: 10000 INJECTION, SOLUTION INTRAVENOUS at 12:59

## 2018-03-23 RX ADMIN — FUROSEMIDE 40 MG: 40 TABLET ORAL at 08:50

## 2018-03-23 RX ADMIN — ACETAMINOPHEN 650 MG: 325 TABLET, FILM COATED ORAL at 16:06

## 2018-03-23 RX ADMIN — SENNOSIDES 8.6 MG: 8.6 TABLET, FILM COATED ORAL at 21:46

## 2018-03-23 RX ADMIN — MAGNESIUM SULFATE HEPTAHYDRATE 2 G: 40 INJECTION, SOLUTION INTRAVENOUS at 08:49

## 2018-03-23 RX ADMIN — CHLORHEXIDINE GLUCONATE 15 ML: 1.2 RINSE ORAL at 21:44

## 2018-03-23 RX ADMIN — CHLORHEXIDINE GLUCONATE 15 ML: 1.2 RINSE ORAL at 08:51

## 2018-03-23 RX ADMIN — HEPARIN SODIUM 4000 UNITS: 1000 INJECTION, SOLUTION INTRAVENOUS; SUBCUTANEOUS at 18:07

## 2018-03-23 RX ADMIN — ALPRAZOLAM 0.5 MG: 0.5 TABLET ORAL at 12:58

## 2018-03-23 RX ADMIN — DOCUSATE SODIUM 100 MG: 100 CAPSULE, LIQUID FILLED ORAL at 08:49

## 2018-03-23 NOTE — PLAN OF CARE
Problem: DISCHARGE PLANNING - CARE MANAGEMENT  Goal: Discharge to post-acute care or home with appropriate resources  INTERVENTIONS:  - Conduct assessment to determine patient/family and health care team treatment goals, and need for post-acute services based on payer coverage, community resources, and patient preferences, and barriers to discharge  - Address psychosocial, clinical, and financial barriers to discharge as identified in assessment in conjunction with the patient/family and health care team  - Arrange appropriate level of post-acute services according to patients   needs and preference and payer coverage in collaboration with the physician and health care team  - Communicate with and update the patient/family, physician, and health care team regarding progress on the discharge plan  - Arrange appropriate transportation to post-acute venues  Outcome: Progressing  CM met with pt at bedside  Pt lives alone in a 2 story house with no SHAN, but 13 steps w/railing to reach the main living quarters and bedroom  Pt is able to navigate steps, but does so slowly due to knee pain  She uses a cane, Cpap, and a nebulizer  She is independent with ADL's  She was in [facility] for rehab 5 years ago following a fall that resulted in a fractured shoulder  She also was in rehab 2 years ago in [place] following a knee replacement  She used [facility], but does not remember the name of the agency  She uses [employer] in [place] and has no problem with the co-pays  Denies substance abuse or mental health issues  She has an Advanced Directive and her POA is her daughter Rhoda Narvaez  Her PCP is Dr Lj Chong  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

## 2018-03-23 NOTE — SOCIAL WORK
CM met with pt at bedside  Pt lives alone in a 2 story house with no SHAN, but 13 steps w/railing to reach the main living quarters and bedroom  Pt is able to navigate steps, but does so slowly due to knee pain  She uses a cane, Cpap, and a nebulizer  She is independent with ADL's  She was in [facility] for rehab 5 years ago following a fall that resulted in a fractured shoulder  She also was in rehab 2 years ago in [place] following a knee replacement  She used [facility] services, but does not remember the name of the agency  She uses [employer] in [place] and has no problem with the co-pays  Denies substance abuse or mental health issues  She has an Advanced Directive and her POA is her daughter Ileana Woods  Her PCP is Dr Stephen Mckenzie  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

## 2018-03-23 NOTE — PROGRESS NOTES
Progress Note - ICU Transfer to Step down/med  surg  Zuly Moore 71 y o  female MRN: [id number]    Unit/Bed#:  Encounter: [id number]    Code Status: Level 1 - Full Code  POA:    POLST:      Reason for ICU adm: hypertensive emergency     Active problems:   Patient Active Problem List   Diagnosis    Aortic stenosis, mild    Coronary arteriosclerosis    Hyperlipidemia    Hypertension    Asymptomatic varicose veins    Factor V Leiden mutation ([place] [address] )    Morbid obesity due to excess calories ([place] [address] )    Hypertensive crisis    Atrial fibrillation ([place] [address] )    COPD (chronic obstructive pulmonary disease) ([place] [address] )    Diabetes ([place] [address] )       Consultants:   Neurology  Cardiology      History of Present Illness/Summary of clinical course:   Leroy Nurse is a 71 y o  female who presents with a past medical history of CAD s/p PCI to the LAD and OM in 2000, HTN, HLD, obesity, mild aortic stenosis, venous thrombosis secondary to factor V Leiden mutation on coumadin therapy, and COPD  She states that she has had symptoms of headache and visual disturbances for approximately 2 weeks  She recently had bronchitis and the symptoms seemed to start after that  She developed head ache and left eye pain  She had noted that her blood pressure had been higher than normal   Her family members had noted that she was "off" from her baseline  She was less talkative, and not always speaking coherently  She had an outpatient CT of the head  However, she does not know the results of this scan  Her symptoms persisted, which prompted her visit to the ED  She was noted to be hypertensive with systolic blood pressure in the 250's  CT of the showing no acute intracranial abnormalities  She was started on a cardene gtt for a short period of time  Her lisinopril dosing was increased to 5 mg  Norvasc 5 mg was added to her regimen and her nadolol was changed to coreg 12 5 mg BID    Her hypertension is significantly more controlled  She is being followed by neurology for potential ocular migraines  Her sedimentation rate was elevated  However, this is likely secondary to her recent infection with bronchitis  An MRI of the brain is pending  Recent or scheduled procedures:   CT head without contrast   Final Result      No acute intracranial abnormality  Workstation performed: [id number]         VAS renal artery complete    (Results Pending)   MRI brain wo contrast    (Results Pending)       Outstanding/pending diagnostics:   MRI completed, results are pending  Renal artery ultrasound pending        Mobilization Plan:   OOB    Nutrition Plan: cardiac diet    Discharge Plan:    Patient should be ready for discharge to home when BP is stable and definitive antihypertensive regimen is established   Initial PT/OT/ST Recommendations: pending    Initial /Plan: pending       Spoke with Dr Maryan Durham at [phone] regarding transfer  Portions of the record may have been created with voice recognition software  Occasional wrong word or "sound a like" substitutions may have occurred due to the inherent limitations of voice recognition software  Read the chart carefully and recognize, using context, where substitutions have occurred

## 2018-03-23 NOTE — PROGRESS NOTES
Neurology progress note  Leroy Nurse 71 y o  female MRN: [id number]  Unit/Bed#:  Encounter: [id number]      Assessment:  1  Headaches with visual dysfunction; -much improved today by patient report  She reports she has slept well overnight and her sleep coupled with likely the normalization of her blood pressure has helped to reduce her headaches and done overall  Her sed rate at 53 is likely to have a multiplicity of triggers given her a hospitalization and premorbid history  Her SAMSON was negative and her Lyme titer is still pending  She does not have significant tenderness at her temples on exam today so I doubt that temporal arteritis is present given her room in in all realms  Her history of ocular migraines makes her at increased risk for headaches and migraines particularly in the setting of a hypertensive crisis  Her MRI of the brain on my review has no area of concern  Provided the radiology also reduce her film as within normal limits there should be no further need for continued ongoing neurologic care  Her exam at this time is also improved and nonfocal compared with her exam yesterday we have discussed with her that headaches that are generated by possibly hypertensive crisis do not always resolve in a corresponding or chronologically appropriate time  As the blood pressure is return to normal  Provided she continues to improve as she has she will not need neurologic follow-up  Please call us for any questions  She was discussed with the Internal Medicine critical care nurse practitioner in the intensive care unit  2  Hypertensive crisis- appears to be improving  She has quit still being followed by the critical care service as a step-down patient in the ICU  I anticipate that if her MRI is negative and her blood pressure and other physiologic parameters are within normal limits she can be transferred out to the floor likely tonight or tomorrow        Subjective:  I really do feel some much better today my headache is not nearly as bad as it was yesterday  Review of Systems:  The patient reports that her headache yesterday as well as the residual headache that she has today is very similar to her priors only it seemed like this 1 was more exaggerated  She denies any visual disturbances aside from her baseline changes  She denies any paracervical or cervical complaints  No chest pain no shortness of breath  The remainder of her cued query for both neurologic as well complaints was negative  Allergies   Allergen Reactions    Iodine     Penicillins     Shellfish-Derived Products     Sulfa Antibiotics        Meds:  Scheduled Meds:    Current Facility-Administered Medications:  acetaminophen 650 mg Oral Q6H PRN   amLODIPine 5 mg Oral Daily   carvedilol 12 5 mg Oral BID With Meals   chlorhexidine 15 mL Swish & Spit Q12H [facility]   docusate sodium 100 mg Oral BID   furosemide 40 mg Oral Daily   heparin (porcine) 3-20 Units/kg/hr (Order-Specific) Intravenous Titrated   heparin (porcine) 2,000 Units Intravenous PRN   heparin (porcine) 4,000 Units Intravenous PRN   hydrALAZINE 20 mg Intravenous Q6H PRN   insulin lispro 1-6 Units Subcutaneous TID AC   lisinopril 5 mg Oral Daily   loratadine 10 mg Oral Daily   pantoprazole 20 mg Oral BID AC   pravastatin 40 mg Oral Daily With Dinner   senna 1 tablet Oral HS     PRN Meds:   acetaminophen she had 1 dose of p r n  Tylenol last evening  She has had none today  Physical Exam:   Objective   Vitals:   Vitals:    03/23/18 1200   BP:    Pulse: 66   Resp: (!) 44   Temp:    SpO2: 97%   ,Body mass index is 44 15 kg/m²  Patient was examined in critical care unit bed  There is no family present she is awake alert and watching TV upon our arrival     General appearance: Cooperative in no acute distress with a us appropriate social greeting  Head & neck head is atraumatic and normocephalic  Neck is supple with full range of motion    No tenderness appreciated  Cardiopulmonary:  Within normal limits    Neurologic:   Patient is alert awake oriented, high functions are intact, speech is fluent  No evidence of any aphasia or dysarthria  No cognitive deficits appreciated on screening  CN Exam:  None for lateralizing and within normal limits  VF full to threat, ADIS, EOM's I,  sensation and motor function in the V1 V2 V3 distribution is symmetric without volitional weakness, cranial nerves 8-12 within normal limits voices clear no dysarthria  Motor examination: reveals normal tone and bulk, strength is 5/5 preserved bilaterally in both upper and lower extremities with bed maneuvers,  Sensory examination:  Grossly intact x4 to light touch cold and vibration  Pinprick not tested  Deep tendon reflexes are intact, toes are downgoing  Coordination no evidence of any finger-to-nose dysmetria with formal maneuvers  Gait:  Not tested today she is hooked up to the monitor the staff nurse reports that she had a generally stable gait with a handhold assist earlier  Lab Results:Lab Results:       Results from last 7 days  Lab Units 03/23/18  0540 03/22/18  1721 03/22/18  1324   WBC Thousand/uL 8 10 8 64 8 28   RBC Million/uL 4 04 4 21 4 51   HEMOGLOBIN g/dL 11 7 12 3 13 1   HEMATOCRIT % 35 4 36 6 39 6   MCV fL 88 87 88   PLATELETS Thousands/uL 223 248 252   , BMP/CMP:     Results from last 7 days  Lab Units 03/23/18  0540 03/22/18  1324   SODIUM mmol/L 139 140   POTASSIUM mmol/L 3 7 4 3   CHLORIDE mmol/L 102 101   CO2 mmol/L 27 29   ANION GAP mmol/L 10 10   BUN mg/dL 19 21   CREATININE mg/dL 0 75 0 87   GLUCOSE RANDOM mg/dL 133 127   CALCIUM mg/dL 9 1 9 4   AST U/L  --  37   ALT U/L  --  58   ALK PHOS U/L  --  49   TOTAL PROTEIN g/dL  --  8 5*   BILIRUBIN TOTAL mg/dL  --  0 50   EGFR ml/min/1 73sq m 80 67     SAMSON is negative, sed rate is 53, a Lyme titers pending  MRI on my review appears to be largely within normal limits    She had minimal to no small vessel disease on her FLAIR images there was no acute ischemic injury or insult that I appreciated on her diffusion  I saw no acute pathology  Radiology reading is pending              "This note has been constructed using a voice recognition system "

## 2018-03-23 NOTE — PROGRESS NOTES
Progress Note - Critical Care   Renetta Rucker 71 y o  female MRN: [id number]  Unit/Bed#:  Encounter: [id number]    Assessment:   Principal Problem:    COPD (chronic obstructive pulmonary disease) (HCC)  Active Problems:    Hypertensive crisis    Coronary arteriosclerosis    Aortic stenosis, mild    Atrial fibrillation (HCC)    Hyperlipidemia    Factor V Leiden mutation ([facility] [address] )    Morbid obesity due to excess calories ([facility] [address] )  Resolved Problems:    * No resolved hospital problems  *    Plan:   Neuro:   · CT head negative for acute intracranial abnormalities  Appreciate Neurology consultation will follow up with MRI today  Concern for PRES v ocular migraine v temporal arteritis   Check sed rate, Lyme titer,and SAMSON  · Frequent neuro checks q 4 hours  · Tylenol prn headache   CV:   · Hypertensive crisis: presenting with  of unknown etiology  · Slow titration of BP in setting neurological symptoms  Goal -180  Cardene gtt weened to off over night  Continue nadolol 40 mg daily and lisinopril 5 mg daily  Add norvasc as needed  · Check Renal Artery U/S to rule out secondary causes  · Cardiology consult  · Diastolic CHF not in exacerbation  Continue home dose of Lasix 40 mg daily  · HLD:  Pravachol 40 mg daily  Lung:   · Encourage good pulmonary toilet/incentive spirometry  · ADAL continue home CPAP QHS  GI:   · Continue home omeprazole 20 mg b i d  Linda Settle · Ppx: senna/colace  FEN:   · Monitor electrolytes  Replete as warranted  · Cardiac diet  :   · Monitor I&Os  · Trend daily BUN/creatinine  ID:   · No obvious infectious etiology  Monitor off of antibiotics  · Trend fever curve/WBC count  Heme:   · H&H and platelets stable  Continue trend and monitor  · Factor 5 Leiden; subtherapeutic INR  Heparin bridge  5 mg Coumadin q h s  repeat INR  · ppx vte; heparin/ Coumadin  Endo:   · DM restart home metformin; with additional sliding scale insulin coverage    Msk/Skin:   · Frequent offloading repositioning to avoid skin breakdown  · PT/OT  Disposition:   · Slow titration of BP  Transfer to med surg     ______________________________________________________________________  Chief Complaint: " I'm just very tired"      HPI/24hr events:   Patient presented to ED for evaluation of headache and visual disturbances x 2 weeks  Upon arrival to ED found to have SBP 250s  Fortunately CTH did not reveal acute abnormalities  She was subsequently admitted as step down 1 for closer hemodynamic monitoring  Upon arrival to ICU arterial line was placed and patient was started on Cardene  Home dose of nadol and lisinopril with addition of norvasc 5 mg    ______________________________________________________________________  Physical Exam:     Physical Exam   Constitutional: She is oriented to person, place, and time  She appears well-developed and well-nourished  No distress  HENT:   Head: Normocephalic and atraumatic  Eyes: EOM are normal  Pupils are equal, round, and reactive to light  Neck: Normal range of motion  Neck supple  Cardiovascular: Normal rate, normal heart sounds and intact distal pulses  Exam reveals no gallop  No murmur heard  Pulmonary/Chest: Effort normal and breath sounds normal  No respiratory distress  She has no wheezes  She has no rales  Abdominal: Soft  Bowel sounds are normal  She exhibits no distension  There is no tenderness  There is no rebound and no guarding  Musculoskeletal: Normal range of motion  She exhibits no edema  Neurological: She is alert and oriented to person, place, and time  CN II-XII intact  5/5 strength in upper and lower extremities  Sensation equal and intact bilaterally  No pronator drift  Skin: Skin is warm  Vitals reviewed      ______________________________________________________________________  Temperature:   Temp (24hrs), Av 9 °F (36 6 °C), Min:97 7 °F (36 5 °C), Max:98 2 °F (36 8 °C)    Current Temperature: 97 7 °F (36 5 °C)    Vitals:    03/22/18 2151 03/22/18 2200 03/22/18 2300 03/23/18 0000   BP: (!) 219/68      BP Location:       Pulse:  71 71 68   Resp:  19 18 19   Temp:       TempSrc:       SpO2:  95% 95% 96%   Weight:       Height:         Arterial Line BP: 162/59  Arterial Line MAP (mmHg): 88 mmHg     Weights:   IBW: 50 1 kg    Body mass index is 44 15 kg/m²  Weight (last 2 days)     Date/Time   Weight    03/22/18 1633  110 (241 4)    03/22/18 1403  --    Comment rows:    OBSERV: pts family states that home BP was systolic of 412F and they do not believe the manual RN BP  they feel that the automatic BP is more accurate at 03/22/18 1403    03/22/18 1257  110 (242 8)    03/22/18 1125  109 (240)            Height: 5' 2" (157 5 cm)    Intake and Outputs:  I/O       03/21 0701 - 03/22 0700 03/22 0701 - 03/23 0700    I V  (mL/kg)  272 5 (2 5)    Total Intake(mL/kg)  272 5 (2 5)    Urine (mL/kg/hr)  500    Total Output   500    Net   -227 5              Nutrition:        Diet Orders            Start     Ordered    03/22/18 1559  Diet Cardiac; Cardiac TLC 2 3 GM NA  Diet effective now     Question Answer Comment   Diet Type Cardiac    Cardiac Cardiac TLC 2 3 GM NA    RD to adjust diet per protocol?  Yes        03/22/18 1559        Labs:     Results from last 7 days  Lab Units 03/22/18  1721 03/22/18  1324   WBC Thousand/uL 8 64 8 28   HEMOGLOBIN g/dL 12 3 13 1   HEMATOCRIT % 36 6 39 6   PLATELETS Thousands/uL 248 252   NEUTROS PCT %  --  61   MONOS PCT %  --  9      Results from last 7 days  Lab Units 03/22/18  1324   SODIUM mmol/L 140   POTASSIUM mmol/L 4 3   CHLORIDE mmol/L 101   CO2 mmol/L 29   BUN mg/dL 21   CREATININE mg/dL 0 87   CALCIUM mg/dL 9 4   TOTAL PROTEIN g/dL 8 5*   BILIRUBIN TOTAL mg/dL 0 50   ALK PHOS U/L 49   ALT U/L 58   AST U/L 37   GLUCOSE RANDOM mg/dL 127                Results from last 7 days  Lab Units 03/22/18  1721 03/22/18  1324   INR  1 66* 1 65*   PTT seconds 37* 37*           0  Lab Value Date/Time TROPONINI <0 02 03/22/2018 2035   TROPONINI <0 02 03/22/2018 1721   TROPONINI <0 02 03/22/2018 1325     Imaging:  I have personally reviewed pertinent reports  and I have personally reviewed pertinent films in PACS  EKG:   Micro:  Blood Culture: No results found for: BLOODCX  Urine Culture: No results found for: URINECX  Sputum Culture: No components found for: SPUTUMCX  Wound Culure: No results found for: WOUNDCULT    No results found for: Daniel Mcdonough SPUTUMCULTUR  Allergies:    Allergies   Allergen Reactions    Iodine     Penicillins     Shellfish-Derived Products     Sulfa Antibiotics      Medications:   Scheduled Meds:  Current Facility-Administered Medications:  acetaminophen 650 mg Oral Q6H PRN Kylah Eldridge PA-C    amLODIPine 10 mg Oral Daily Justa Swanson, MAME    chlorhexidine 15 mL Swish & Spit Q12H [address] Justa Swanson, MAME    docusate sodium 100 mg Oral BID Kylah Eldridge PA-C    furosemide 40 mg Oral Daily Justa Swanson, ISHANNP    heparin (porcine) 3-20 Units/kg/hr (Order-Specific) Intravenous Titrated Justa Swanson, CRNP Last Rate: 11 1 Units/kg/hr (03/22/18 1859)   heparin (porcine) 2,000 Units Intravenous PRN Justa Swanson, CRNP    heparin (porcine) 4,000 Units Intravenous PRN Justa Swanson, CRNP    hydrALAZINE 20 mg Intravenous Q6H PRN Justa Swanson, CRNP    lisinopril 5 mg Oral Daily Kylah Eldridge PA-C    loratadine 10 mg Oral Daily Justa Swanson, CRNP    nadolol 40 mg Oral Daily Justa Swanson, CRNP    niCARdipine 1-15 mg/hr Intravenous Titrated ISHAN CurtisNP Last Rate: Stopped (03/22/18 2316)   pantoprazole 20 mg Oral BID AC Justa Swanson, CRNP    pravastatin 40 mg Oral Daily With Dinner Justa Swanson, MAME    senna 1 tablet Oral HS Kylah Eldridge PA-C      Continuous Infusions:  heparin (porcine) 3-20 Units/kg/hr (Order-Specific) Last Rate: 11 1 Units/kg/hr (03/22/18 1859)   niCARdipine 1-15 mg/hr Last Rate: Stopped (03/22/18 2316)     PRN Meds:    acetaminophen 650 mg Q6H PRN   heparin (porcine) 2,000 Units PRN   heparin (porcine) 4,000 Units PRN   hydrALAZINE 20 mg Q6H PRN     VTE Pharmacologic Prophylaxis: Heparin  VTE Mechanical Prophylaxis: sequential compression device  Invasive lines and devices: Invasive Devices     Peripheral Intravenous Line            Peripheral IV 03/22/18 Right Antecubital less than 1 day          Arterial Line            Arterial Line 03/22/18 Radial less than 1 day                   Counseling / Coordination of Care  Total Critical Care time spent 36 minutes excluding procedures, teaching and family updates  Code Status: Level 1 - Full Code    Portions of the record may have been created with voice recognition software  Occasional wrong word or "sound a like" substitutions may have occurred due to the inherent limitations of voice recognition software  Read the chart carefully and recognize, using context, where substitutions have occurred      Nery Gutiérrez PA-C

## 2018-03-23 NOTE — CASE MANAGEMENT
Initial Clinical Review    Admission: Date/Time/Statement: 3/22/18 @ 1425     Orders Placed This Encounter   Procedures    Inpatient Admission (expected length of stay for this patient is greater than two midnights)     Standing Status:   Standing     Number of Occurrences:   1     Order Specific Question:   Admitting Physician     Answer:   Brent Clarke [[id number]]     Order Specific Question:   Level of Care     Answer:   Med Surg [16]     Order Specific Question:   Estimated length of stay     Answer:   More than 2 Midnights     Order Specific Question:   Certification     Answer:   I certify that inpatient services are medically necessary for this patient for a duration of greater than two midnights  See H&P and MD Progress Notes for additional information about the patient's course of treatment  ED: Date/Time/Mode of Arrival:   ED Arrival Information     Expected Arrival Acuity Means of Arrival Escorted By Service Admission Type    - 3/22/2018 11:03 Urgent Walk-In Self Critical Care/ICU Urgent    Arrival Complaint    HYPERTENSION          Chief Complaint:   Chief Complaint   Patient presents with    Hypertension     pts primary care  sent her to be checked b/c of hypertension  pt has been being seen for pain in her eye for the past few days  had a CT scan two days ago       History of Illness:   Akshat Clakr is a 71 y o  female who presents with a past medical history of CAD s/p PCI to the LAD and OM in 2000, HTN, HLD, obesity, mild aortic stenosis, venous thrombosis secondary to factor V Leiden mutation on coumadin therapy, and COPD  She states that she has had symptoms of headache and visual disturbances for approximately 2 weeks  She recently had bronchitis and the symptoms seemed to start after that  She developed head ache and left eye pain  She had noted that her blood pressure had been higher than normal   Her family members had noted that she was "off" from her baseline    She was less talkative, and not always speaking coherently  She had an outpatient CT of the head  However, she does not know the results of this scan  Her symptoms persisted, which prompted her visit to the ED  She was noted to be hypertensive with systolic blood pressure in the 250's  CT of the showing no acute intracranial abnormalities  She being admitted to the step down unit for further management and care        ED Vital Signs:   ED Triage Vitals   Temperature Pulse Respirations Blood Pressure SpO2   03/22/18 1125 03/22/18 1125 03/22/18 1125 03/22/18 1128 03/22/18 1125   97 8 °F (36 6 °C) 69 16 (!) 224/95 97 %      Temp Source Heart Rate Source Patient Position - Orthostatic VS BP Location FiO2 (%)   03/22/18 1125 03/22/18 1125 03/22/18 1128 03/22/18 1128 --   Oral Monitor Sitting Right arm       Pain Score       03/22/18 1125       Worst Possible Pain        Wt Readings from Last 1 Encounters:   03/22/18 110 kg (241 lb 6 5 oz)       Vital Signs (abnormal): Abnormal Labs/Diagnostic Test Results:   03/22/18  1324     WBC Thousand/uL 8 28   HEMOGLOBIN g/dL 13 1   HEMATOCRIT % 39 6   PLATELETS Thousands/uL 252   NEUTROS PCT % 61   MONOS PCT % 9      Results from last 7 days  Lab Units 03/22/18  1324   SODIUM mmol/L 140   POTASSIUM mmol/L 4 3   CHLORIDE mmol/L 101   CO2 mmol/L 29   BUN mg/dL 21   CREATININE mg/dL 0 87   CALCIUM mg/dL 9 4   TOTAL PROTEIN g/dL 8 5*   BILIRUBIN TOTAL mg/dL 0 50   ALK PHOS U/L 49   ALT U/L 58   AST U/L 37   GLUCOSE RANDOM mg/dL 127                  Results from last 7 days  Lab Units 03/22/18  1324   INR   1 65*   PTT seconds 37*             0  Lab Value Date/Time   TROPONINI <0 02 03/22/2018 1325         Imaging:   CT head without contrast   Final Result       No acute intracranial abnormality                  EKG: NSR with 1st degree This was personally reviewed by myself     ED Treatment:   Medication Administration from 03/22/2018 1103 to 03/22/2018 1632       Date/Time Order Dose Route Action Action by Comments     03/22/2018 1431 hydrALAZINE (APRESOLINE) injection 10 mg 10 mg Intravenous Given Isabelle Rome RN      03/22/2018 9337 labetalol (NORMODYNE) injection 10 mg 10 mg Intravenous Given Isabelle Rome RN           Past Medical/Surgical History: Active Ambulatory Problems     Diagnosis Date Noted    Aortic stenosis, mild 09/26/2017    Coronary arteriosclerosis 01/24/2014    Hyperlipidemia 01/24/2014    Hypertension 01/24/2014    Asymptomatic varicose veins 01/24/2014    Factor V Leiden mutation ([facility] ) 06/23/2014    Morbid obesity due to excess calories ([place] [address] ) 02/12/2018     Resolved Ambulatory Problems     Diagnosis Date Noted    No Resolved Ambulatory Problems     Past Medical History:   Diagnosis Date    Aortic ejection murmur     AS (aortic stenosis)     Atrial fibrillation (HCC)     COPD (chronic obstructive pulmonary disease) (HCC)     Diabetes mellitus (HCC)     Exertional shortness of breath     HTN (hypertension)     Hyperlipidemia     Obesity        Admitting Diagnosis: Coronary arteriosclerosis [I25 10]  Hyperlipidemia [E78 5]  Essential hypertension [I10]  Hypertension [I10]  Factor V Leiden mutation ([place] [address] ) [D68 51]  Headache [R51]  Hypertensive crisis, unspecified [I16 9]    Age/Sex: 71 y o  female    Assessment/Plan:   Attestation signed by Apple Rose MD at 3/23/2018 8:55 AM   Patient Seen 3/22/18  Date of Service 3/22/18     Rianna Barkley MD , saw and evaluated the patient  I have discussed the patient with the Critical Care Advanced practitioner and agree with his/her findings, Plan of Care, and MDM as documented in his/her note, except where noted   All available labs and Radiology studies were reviewed       At this point I agree with the current assessment done in the Critical Care Unit      I have conducted an independent evaluation of this patient including a detailed history and a physical exam      Critical Care Time for this patient is 30 minutes; not including time for procedures documented elsewhere or time spent teaching           Lab Results   Component Value Date     WBC 8 64 03/22/2018     HGB 12 3 03/22/2018     HCT 36 6 03/22/2018     MCV 87 03/22/2018      03/22/2018            Lab Results   Component Value Date     GLUCOSE 127 03/22/2018     CALCIUM 9 4 03/22/2018      03/22/2018     K 4 3 03/22/2018     CO2 29 03/22/2018      03/22/2018     BUN 21 03/22/2018     CREATININE 0 87 03/22/2018            Lab Results   Component Value Date     INR 1 66 (H) 03/22/2018     INR 1 65 (H) 03/22/2018     PROTIME 20 1 (H) 03/22/2018     PROTIME 19 9 (H) 03/22/2018            Lab Results   Component Value Date     PTT 37 (H) 03/22/2018         Gen: Well appearing and well nourished, in NAD  Skin: Warm, Dry, Intact    HEENT:  PERRLA, EOMI, NCAT  CV: RRR, +s1/s2, no M/R/G  Pulm: Lungs CTAB  Abd: Soft, non-tender, non-distended  Ext:  No cyanosis or edema  Neuro: AAOx3; CN II-XII grossly intact; 5/5 BLUE, 5/5 BLLE; Sensation intact grossly   Psych:  Normal mood and affect        A/P: 71year old F with a PMH of CAD, PCI, HTN, COPD, aortic stenosis and Factor V Leiden on coumadin (subtherapeutic today) who presents with headache, visual changes, left eye pain and found to have an SBP in the 250s  Plan: neurology following, will repeat MRI head tomorrow, heparin infusion with bridge to coumadin, SBP goal of 160s-180s, norvasc 10 mg, cardene PO, continue home lasix, statin, TTE pending, stepdown  Full code         []Hide copied text  []Hover for attribution information          Assessment:   1  Hypertensive emergency   2  Visual disturbances secondary to # 1  3  CAD s/p stenting  4  Venous thromboembolism secondary to factor V leiden on coumadin therapy   5  HLD   6  History of mild aortic stenosis  7  Obesity  8  ADAL with CPAP therapy  9  History of breast CA s/p lumpectomy   10   Subtherapeutic INR     Plan:                  Neuro: -monitor neuro status closely  -CT of head in ED negative for any intracranial abnormalities   -neurology consulted in ER for persistent visual disturbances in the setting of factor V leiden mutation and subtherapeutic INR  -neuro symptoms likely secondary to hypertension               CV:   -10 mg IV labetalol given in the ED, without much improvement in blood pressure  -peak systolic blood pressure was 225, will start cardene gtt for systolic goal -605   -will continue home dose BB with nadolol, will increase lisinopril dosing to 5mg  -will add norvasc 10 mg  -titrate cardene to off after PO medications administered   -continue home dose lasix   -continue statin   -patient is known to Dr Rhonda Funes, will consult for further recommendations  -echo in June of 2017 showing no regional wall motion abnormalities, mild concentric hypertrophy and grade 1 diastolic dysfunction   -trend troponins              Lung:   -monitor respiratory status  -early mobilization  -incentive spirometry               GI:   -cardiac diet  -PPI for GI prophylaxis               FEN:   -monitor electrolytes and replete as necessary               :   -monitor urine output closely   -check UA  -check renal artery ultra sound              ID:   -no infectious etiology              Heme:   -patient has had subtherapeutic INR  -will start on heparin gtt with bridge back to coumadin                Endo:   -monitor glucose via BMp  -check TSH               Msk/Skin:   -early mobilization  -turn and reposition while in bed              Disposition:   -will monitor in step down level of care     ______________________________________________________________________     VTE Pharmacologic Prophylaxis: Sequential compression device (Venodyne)  and Heparin  VTE Mechanical Prophylaxis: sequential compression device     Invasive lines and devices:       Invasive Devices            Peripheral Intravenous Line                     Peripheral IV 03/22/18 Right Antecubital less than 1 day                     Code Status: Level 1 - Full Code  POA:    POLST:       Given critical illness, patient length of stay will require greater than two midnights        Admission Orders:  [email or web address]  CONSULT NEUROLOGY  FALL PRECAUTIONS  CONSULT CARDIOLOGY  NEURO CHECKS Q2H  C&DB  DARLENE  O2  SEQ COMP DEVICE  CARDIAC DIET    Scheduled Meds:   Current Facility-Administered Medications:  acetaminophen 650 mg Oral Q6H PRN Joleen Bowie PA-C    amLODIPine 5 mg Oral Daily Ilsa Martino MD    carvedilol 12 5 mg Oral BID With Meals Kwabena Shaffer PA-C    chlorhexidine 15 mL Swish [employer] Spit Q12H [address] [place], CRNP    docusate sodium 100 mg Oral BID Joleen Bowie PA-C    furosemide 40 mg Oral Daily Henret[place], CRNP    heparin (porcine) 3-20 Units/kg/hr (Order-Specific) Intravenous Titrated Henret[place], CRNP Last Rate: 13 1 Units/kg/hr (03/23/18 0208)   heparin (porcine) 2,000 Units Intravenous PRN Henretta [place], CRNP    heparin (porcine) 4,000 Units Intravenous PRN Henretta [place], CRNP    hydrALAZINE 20 mg Intravenous Q6H PRN [place], CRNP    insulin lispro 1-6 Units Subcutaneous TID AC Joleen Bowie PA-C    lisinopril 5 mg Oral Daily Joleen Bowie PA-C    loratadine 10 mg Oral Daily Henretta [place], MAME    pantoprazole 20 mg Oral BID AC HenMAME [place]    pravastatin 40 mg Oral Daily With Dinner Henretta [place], ISHANNP    senna 1 tablet Oral HS Joleen Bowie PA-C      Continuous Infusions:   heparin (porcine) 3-20 Units/kg/hr (Order-Specific) Last Rate: 13 1 Units/kg/hr (03/23/18 0208)     CARDENE GTT    PRN Meds:   acetaminophen    heparin (porcine)    heparin (porcine)    hydrALAZINE

## 2018-03-23 NOTE — PLAN OF CARE
CARDIOVASCULAR - ADULT     Maintains optimal cardiac output and hemodynamic stability Progressing        Potential for Falls     Patient will remain free of falls Progressing        Prexisting or High Potential for Compromised Skin Integrity     Skin integrity is maintained or improved Progressing

## 2018-03-23 NOTE — CONSULTS
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, COPD, hypertension presenting with hypertensive urgency  Patient was recently treated for bronchitis with prednisone and cough medications  She subsequently developed a headache and left eye pain accompanied by visual disturbances approximately 2 weeks ago  Denies chest pain, shortness of breath, palpitations, syncope  CT head shows no acute intracranial abnormality  BP peaked in 250s/120s  Currently 185/70  Was weaned off of Cardene drip yesterday  Follows cardiologist Dr Anastasia Saldaña outpatient  REVIEW OF SYSTEMS:  Constitutional:  Denies fever or chills   Eyes:  +L eye pain, +visual disturbance  HENT:  Denies nasal congestion or sore throat   Respiratory:  Denies cough or shortness of breath   Cardiovascular:  Denies chest pain or edema   GI:  Denies abdominal pain, nausea, vomiting, bloody stools or diarrhea   :  Denies dysuria, frequency, difficulty in micturition and nocturia  Musculoskeletal:  Denies back pain or joint pain   Neurologic:  +headache   Denies focal weakness or sensory changes   Endocrine:  Denies polyuria or polydipsia   Lymphatic:  Denies swollen glands   Psychiatric:  Denies depression or anxiety     Historical Information   Past Medical History:   Diagnosis Date    Aortic ejection murmur     AS (aortic stenosis)     Atrial fibrillation (HCC)     COPD (chronic obstructive pulmonary disease) (HCC)  Diabetes mellitus ([address] )     Exertional shortness of breath     HTN (hypertension)     Hyperlipidemia     Obesity      Past Surgical History:   Procedure Laterality Date    ADENOIDECTOMY      CARDIAC CATHETERIZATION      HYSTERECTOMY      KNEE SURGERY      ROTATOR CUFF REPAIR      TONSILLECTOMY       History   Alcohol Use No     History   Drug Use No     History   Smoking Status    Never Smoker   Smokeless Tobacco    Never Used       Family History:   Family History   Problem Relation Age of Onset    COPD Mother        MEDS & ALLERGIES:  all current active meds have been reviewed and current meds: Current Facility-Administered Medications   Medication Dose Route Frequency    acetaminophen (TYLENOL) tablet 650 mg  650 mg Oral Q6H PRN    amLODIPine (NORVASC) tablet 5 mg  5 mg Oral Daily    carvedilol (COREG) tablet 12 5 mg  12 5 mg Oral BID With Meals    chlorhexidine (PERIDEX) 0 12 % oral rinse 15 mL  15 mL Swish & Spit Q12H [facility]    docusate sodium (COLACE) capsule 100 mg  100 mg Oral BID    furosemide (LASIX) tablet 40 mg  40 mg Oral Daily    heparin (porcine) 25,000 units in 250 mL infusion (premix)  3-20 Units/kg/hr (Order-Specific) Intravenous Titrated    heparin (porcine) injection 2,000 Units  2,000 Units Intravenous PRN    heparin (porcine) injection 4,000 Units  4,000 Units Intravenous PRN    hydrALAZINE (APRESOLINE) injection 20 mg  20 mg Intravenous Q6H PRN    insulin lispro (HumaLOG) 100 units/mL subcutaneous injection 1-6 Units  1-6 Units Subcutaneous TID AC    lisinopril (ZESTRIL) tablet 5 mg  5 mg Oral Daily    loratadine (CLARITIN) tablet 10 mg  10 mg Oral Daily    magnesium sulfate 2 g/50 mL IVPB (premix) 2 g  2 g Intravenous Q2H    pantoprazole (PROTONIX) EC tablet 20 mg  20 mg Oral BID AC    pravastatin (PRAVACHOL) tablet 40 mg  40 mg Oral Daily With Dinner    senna (SENOKOT) tablet 8 6 mg  1 tablet Oral HS       heparin (porcine) 3-20 Units/kg/hr (Order-Specific) Last Rate: 13 1 Units/kg/hr (18 9430)     Allergies   Allergen Reactions    Iodine     Penicillins     Shellfish-Derived Products     Sulfa Antibiotics        OBJECTIVE:  Vitals:   Vitals:    18 0700   BP: (!) 185/70   Pulse: 67   Resp: 16   Temp: 97 5 °F (36 4 °C)   SpO2: 96%     Body mass index is 44 15 kg/m²  Systolic (61AYY), TJJ:166 , Min:174 , UB     Diastolic (67OHC), GPN:36, Min:54, Max:120      Intake/Output Summary (Last 24 hours) at 18 0952  Last data filed at 18 0400   Gross per 24 hour   Intake           315 86 ml   Output              900 ml   Net          -584 14 ml     Weight (last 2 days)     Date/Time   Weight    18 1633  110 (241 4)    18 1403  --    Comment rows:    OBSERV: pts family states that home BP was systolic of 529B and they do not believe the manual RN BP  they feel that the automatic BP is more accurate at 18 1403    18 1257  110 (242 8)    18 1125  109 (240)            Invasive Devices     Peripheral Intravenous Line            Peripheral IV 18 Right Antecubital less than 1 day    Peripheral IV 18 Right;Upper Arm less than 1 day          Arterial Line            Arterial Line 18 Radial less than 1 day                PHYSICAL EXAMS:  General:  +obese  Patient is not in acute distress, laying in the bed comfortably, awake, alert responding to commands  Head: Normocephalic, Atraumatic  HEENT: White sclera, pink conjunctiva,  PERRLA,pharynx benign  Neck:  Supple, no neck vein distention, carotids+2/+2 no bruits, thyromegaly, adenopathy  Respiratory: clear to P/A  Cardiovascular:  PMI normal, S1-S2 normal, No  Murmurs, thrills, gallops, rubs   Regular rhythm  GI:  Abdomen soft nontender   No hepatosplenomegaly, adenopathy, ascites,or rebound tenderness  Extremities: No edema, normal pulses, no calf tenderness, no joint deformities, no venous disease   Integument:  No skin rashes or ulceration  Lymphatic: No cervical or inguinal lymphadenopathy  Neurologic:  Patient is awake alert, responding to command, well-oriented to time and place and person moving all extremities    LABORATORY RESULTS:    Results from last 7 days  Lab Units 03/22/18 2035 03/22/18  1721 03/22/18  1325   TROPONIN I ng/mL <0 02 <0 02 <0 02     CBC with diff:   Results from last 7 days  Lab Units 03/23/18  0540 03/22/18  1721 03/22/18  1324   WBC Thousand/uL 8 10 8 64 8 28   HEMOGLOBIN g/dL 11 7 12 3 13 1   HEMATOCRIT % 35 4 36 6 39 6   MCV fL 88 87 88   PLATELETS Thousands/uL 223 248 252   MCH pg 29 0 29 2 29 0   MCHC g/dL 33 1 33 6 33 1   RDW % 12 6 12 6 12 6   MPV fL 9 6 9 9 9 9   NRBC AUTO /100 WBCs 0  --  0       CMP:  Results from last 7 days  Lab Units 03/23/18  0540 03/22/18  1324   SODIUM mmol/L 139 140   POTASSIUM mmol/L 3 7 4 3   CHLORIDE mmol/L 102 101   CO2 mmol/L 27 29   ANION GAP mmol/L 10 10   BUN mg/dL 19 21   CREATININE mg/dL 0 75 0 87   GLUCOSE RANDOM mg/dL 133 127   CALCIUM mg/dL 9 1 9 4   AST U/L  --  37   ALT U/L  --  58   ALK PHOS U/L  --  49   TOTAL PROTEIN g/dL  --  8 5*   BILIRUBIN TOTAL mg/dL  --  0 50   EGFR ml/min/1 73sq m 80 67       BMP:  Results from last 7 days  Lab Units 03/23/18  0540 03/22/18  1324   SODIUM mmol/L 139 140   POTASSIUM mmol/L 3 7 4 3   CHLORIDE mmol/L 102 101   CO2 mmol/L 27 29   BUN mg/dL 19 21   CREATININE mg/dL 0 75 0 87   GLUCOSE RANDOM mg/dL 133 127   CALCIUM mg/dL 9 1 9 4              Results from last 7 days  Lab Units 03/23/18  0540   MAGNESIUM mg/dL 1 3*           Results from last 7 days  Lab Units 03/22/18  1324   TSH 3RD GENERATON uIU/mL 0 625       Results from last 7 days  Lab Units 03/23/18  0540 03/22/18  1721 03/22/18  1324   INR  1 63* 1 66* 1 65*       Lipid Profile:   Lab Results   Component Value Date    CHOL 169 03/23/2018    CHOL 143 06/04/2015     Lab Results   Component Value Date    HDL 41 03/23/2018    HDL 36 06/04/2015     Lab Results   Component Value Date    LDLCALC 87 2018    LDLCALC 59 2015     Lab Results   Component Value Date    TRIG 204 (H) 2018    TRIG 238 2015       Cardiac testing:   Results for orders placed during the hospital encounter of 17   Echo complete with contrast if indicated    Narrative [address], [facility]  [phone]    Transthoracic Echocardiogram  2D, M-mode, Doppler, and Color Doppler    Study date:  2017    Patient: Cherry Tony  MR number: [id number]  Account number: [de-identified]  : 1945  Age: 80 years  Gender: Female  Status: Outpatient  Location: [facility]  Height: 61 in  Weight: 241 lb  BP: 158/ 68 mmHg    Indications: Aortic valve disorder  Diagnoses: I35 0 - Nonrheumatic aortic (valve) stenosis    Sonographer:  Maldonado RCS  Interpreting Physician:  Lea Calero MD  Primary Physician:  Charlette Sanchez DO  Referring Physician:  Lea Calero MD  Group:  [facility]    SUMMARY    LEFT VENTRICLE:  There were no regional wall motion abnormalities  There was mild concentric hypertrophy  Doppler parameters were consistent with abnormal left ventricular relaxation (grade 1 diastolic dysfunction)  LEFT ATRIUM:  The atrium was mildly dilated  MITRAL VALVE:  There was mild annular calcification  There was trace regurgitation  AORTIC VALVE:  There was mild stenosis  TRICUSPID VALVE:  There was trace regurgitation  AORTA:  The root exhibited normal size and mild fibrocalcific change  HISTORY: PRIOR HISTORY: COPD  A Fib  CAD  Risk factors: hypertension, diabetes, hypercholesterolemia, and morbid obesity  PRIOR PROCEDURES: Stent  PROCEDURE: The study was performed in the [address] [place] [address]  This was a routine study  The transthoracic approach was used  The study included complete 2D imaging, M-mode, complete spectral Doppler, and color Doppler   The  heart rate was 62 bpm, at the start of the study  Images were obtained from the parasternal, apical, subcostal, and suprasternal notch acoustic windows  This was a technically difficult study  LEFT VENTRICLE: Size was normal  Systolic function was by visual assessment  Ejection fraction was estimated to be 65 %  There were no regional wall motion abnormalities  There was mild concentric hypertrophy  DOPPLER: Doppler parameters  were consistent with abnormal left ventricular relaxation (grade 1 diastolic dysfunction)  RIGHT VENTRICLE: The size was normal  Systolic function was normal  Wall thickness was normal     LEFT ATRIUM: The atrium was mildly dilated  RIGHT ATRIUM: Size was normal     MITRAL VALVE: There was mild annular calcification  DOPPLER: There was trace regurgitation  AORTIC VALVE: The valve was trileaflet  Leaflets exhibited mild calcification  DOPPLER: There was mild stenosis  TRICUSPID VALVE: DOPPLER: There was trace regurgitation  PERICARDIUM: There was no pericardial effusion  The pericardium was normal in appearance  AORTA: The root exhibited normal size and mild fibrocalcific change  PULMONARY ARTERY: DOPPLER: Systolic pressure was within the normal range  SYSTEM MEASUREMENT TABLES    Apical four chamber  4 chamber Left Atrium Volume Index; Planimetry; End Systole; Apical four chamber;: 19 24 cm2  Right Atrium Systolic Area; Planimetry; End Systole; Apical four chamber;: 15 86 cm2  TAPSE: 22 6 mm    Unspecified Scan Mode  Aortic Root Diameter; End Systole;: 26 5 mm  Cardiovascular Orifice Diameter; End Systole;: 21 6 mm  Gradient Pressure, Peak; Simplified Bernoulli; Antegrade Flow; Systole;: 26 mm[Hg]  Gradient pressure, average; Simplified Bernoulli; Antegrade Flow; Systole;: 13 9 mm[Hg]  Left atrial diameter; End Diastole;: 42 3 mm  Cardiac Output; Teichholz; Systole;: 3 83 L/min  Interventricular Septum Diastolic Thickness;  Teichholz; End Diastole;: 12 5 mm  Left Ventricle Internal End Diastolic Dimension; Teichholz;: 46 8 mm  Left Ventricle Internal Systolic Dimension; Teichholz; End Systole;: 31 2 mm  Left Ventricle Posterior Wall Diastolic Thickness; Teichholz; End Diastole;: 12 2 mm  Left Ventricular Ejection Fraction; Teichholz;: 62 %  Stroke volume; Teichholz; Systole;: 62 8 ml  Mitral Valve Area; Area by Pressure Half-Time; Systole;: 4 31 cm2  Mitral Valve E to A Ratio; Systole;: 0 79  Maximum Tricuspid valve regurgitation pressure gradient; Regurgitant Flow; Systole;: 24 3 mm[Hg]    [facility]    Prepared and electronically signed by    Jimmy Gibbs MD  Signed 21-Jun-2017 13:33:21         Imaging:   I have personally reviewed pertinent reports  EKG reviewed personally:  1st degree AV block    Assessment/Plan:  1  Hypertensive urgency, Hx HTN:  Last recorded /70  Recommend switching nadolol to Coreg 12 5 mg p  o  B i d  Continue to monitor  Continue all other medications  2   CAD S/P PCI to LAD and OM in 2000:  No anginal symptoms  Had recent echocardiogram and stress test in June 2017 which were both unremarkable for ischemia  EF 65%, no regional wall motion abnormalities, grade 1 diastolic dysfunction  Continue statin  Will change nadolol to Coreg  No aspirin due to high bleeding risk with Coumadin per Dr Misael Moffett last note  3   Aortic stenosis:  Mild per June 2017 echocardiogram     4   Hyperlipidemia:  Continue statin  5   Chronic anticoagulation:  Secondary to factor 5 leiden mutation    Code Status: Level 1 - Full Code    Counseling / Coordination of Care  Total floor / unit time spent today 35 minutes  Greater than 50% of total time was spent with the patient and / or family counseling and / or coordination of care  A description of the counseling / coordination of care: Review of history, current assessment, development of a plan      Roselyn Kraft PA-C  3/23/2018,9:52 AM

## 2018-03-24 LAB
ANION GAP SERPL CALCULATED.3IONS-SCNC: 10 MMOL/L (ref 4–13)
APTT PPP: 133 SECONDS (ref 23–35)
APTT PPP: 64 SECONDS (ref 23–35)
APTT PPP: 68 SECONDS (ref 23–35)
BASOPHILS # BLD AUTO: 0.07 THOUSANDS/ΜL (ref 0–0.1)
BASOPHILS NFR BLD AUTO: 1 % (ref 0–1)
BUN SERPL-MCNC: 26 MG/DL (ref 5–25)
CALCIUM SERPL-MCNC: 8.8 MG/DL (ref 8.3–10.1)
CHLORIDE SERPL-SCNC: 102 MMOL/L (ref 100–108)
CO2 SERPL-SCNC: 26 MMOL/L (ref 21–32)
CREAT SERPL-MCNC: 0.91 MG/DL (ref 0.6–1.3)
EOSINOPHIL # BLD AUTO: 0.29 THOUSAND/ΜL (ref 0–0.61)
EOSINOPHIL NFR BLD AUTO: 4 % (ref 0–6)
ERYTHROCYTE [DISTWIDTH] IN BLOOD BY AUTOMATED COUNT: 12.9 % (ref 11.6–15.1)
GFR SERPL CREATININE-BSD FRML MDRD: 64 ML/MIN/1.73SQ M
GLUCOSE SERPL-MCNC: 128 MG/DL (ref 65–140)
GLUCOSE SERPL-MCNC: 170 MG/DL (ref 65–140)
GLUCOSE SERPL-MCNC: 180 MG/DL (ref 65–140)
GLUCOSE SERPL-MCNC: 193 MG/DL (ref 65–140)
GLUCOSE SERPL-MCNC: 199 MG/DL (ref 65–140)
GLUCOSE SERPL-MCNC: 201 MG/DL (ref 65–140)
HCT VFR BLD AUTO: 37.3 % (ref 34.8–46.1)
HGB BLD-MCNC: 12.3 G/DL (ref 11.5–15.4)
INR PPP: 1.46 (ref 0.86–1.16)
LYMPHOCYTES # BLD AUTO: 1.96 THOUSANDS/ΜL (ref 0.6–4.47)
LYMPHOCYTES NFR BLD AUTO: 27 % (ref 14–44)
MAGNESIUM SERPL-MCNC: 1.6 MG/DL (ref 1.6–2.6)
MCH RBC QN AUTO: 29.6 PG (ref 26.8–34.3)
MCHC RBC AUTO-ENTMCNC: 33 G/DL (ref 31.4–37.4)
MCV RBC AUTO: 90 FL (ref 82–98)
MONOCYTES # BLD AUTO: 0.75 THOUSAND/ΜL (ref 0.17–1.22)
MONOCYTES NFR BLD AUTO: 11 % (ref 4–12)
NEUTROPHILS # BLD AUTO: 4.05 THOUSANDS/ΜL (ref 1.85–7.62)
NEUTS SEG NFR BLD AUTO: 57 % (ref 43–75)
NRBC BLD AUTO-RTO: 0 /100 WBCS
PLATELET # BLD AUTO: 218 THOUSANDS/UL (ref 149–390)
PMV BLD AUTO: 10.1 FL (ref 8.9–12.7)
POTASSIUM SERPL-SCNC: 4.5 MMOL/L (ref 3.5–5.3)
PROTHROMBIN TIME: 18.1 SECONDS (ref 12.1–14.4)
RBC # BLD AUTO: 4.15 MILLION/UL (ref 3.81–5.12)
SODIUM SERPL-SCNC: 138 MMOL/L (ref 136–145)
WBC # BLD AUTO: 7.15 THOUSAND/UL (ref 4.31–10.16)

## 2018-03-24 PROCEDURE — 85730 THROMBOPLASTIN TIME PARTIAL: CPT | Performed by: PHYSICIAN ASSISTANT

## 2018-03-24 PROCEDURE — 83735 ASSAY OF MAGNESIUM: CPT | Performed by: NURSE PRACTITIONER

## 2018-03-24 PROCEDURE — 99232 SBSQ HOSP IP/OBS MODERATE 35: CPT | Performed by: GENERAL PRACTICE

## 2018-03-24 PROCEDURE — 85610 PROTHROMBIN TIME: CPT | Performed by: NURSE PRACTITIONER

## 2018-03-24 PROCEDURE — 85025 COMPLETE CBC W/AUTO DIFF WBC: CPT | Performed by: NURSE PRACTITIONER

## 2018-03-24 PROCEDURE — 87086 URINE CULTURE/COLONY COUNT: CPT | Performed by: GENERAL PRACTICE

## 2018-03-24 PROCEDURE — 94760 N-INVAS EAR/PLS OXIMETRY 1: CPT

## 2018-03-24 PROCEDURE — 85730 THROMBOPLASTIN TIME PARTIAL: CPT | Performed by: GENERAL PRACTICE

## 2018-03-24 PROCEDURE — 82570 ASSAY OF URINE CREATININE: CPT | Performed by: GENERAL PRACTICE

## 2018-03-24 PROCEDURE — 82384 ASSAY THREE CATECHOLAMINES: CPT | Performed by: GENERAL PRACTICE

## 2018-03-24 PROCEDURE — 87186 SC STD MICRODIL/AGAR DIL: CPT | Performed by: GENERAL PRACTICE

## 2018-03-24 PROCEDURE — 82948 REAGENT STRIP/BLOOD GLUCOSE: CPT

## 2018-03-24 PROCEDURE — 87077 CULTURE AEROBIC IDENTIFY: CPT | Performed by: GENERAL PRACTICE

## 2018-03-24 PROCEDURE — 80048 BASIC METABOLIC PNL TOTAL CA: CPT | Performed by: NURSE PRACTITIONER

## 2018-03-24 RX ORDER — WARFARIN SODIUM 5 MG/1
5 TABLET ORAL
Status: DISCONTINUED | OUTPATIENT
Start: 2018-03-24 | End: 2018-03-25

## 2018-03-24 RX ADMIN — PRAVASTATIN SODIUM 40 MG: 40 TABLET ORAL at 18:00

## 2018-03-24 RX ADMIN — INSULIN LISPRO 1 UNITS: 100 INJECTION, SOLUTION INTRAVENOUS; SUBCUTANEOUS at 07:48

## 2018-03-24 RX ADMIN — CHLORHEXIDINE GLUCONATE 15 ML: 1.2 RINSE ORAL at 08:11

## 2018-03-24 RX ADMIN — CARVEDILOL 12.5 MG: 12.5 TABLET, FILM COATED ORAL at 07:47

## 2018-03-24 RX ADMIN — FUROSEMIDE 40 MG: 40 TABLET ORAL at 08:11

## 2018-03-24 RX ADMIN — PANTOPRAZOLE SODIUM 20 MG: 20 TABLET, DELAYED RELEASE ORAL at 07:47

## 2018-03-24 RX ADMIN — LORATADINE 10 MG: 10 TABLET ORAL at 08:11

## 2018-03-24 RX ADMIN — HYDRALAZINE HYDROCHLORIDE 20 MG: 20 INJECTION INTRAMUSCULAR; INTRAVENOUS at 07:47

## 2018-03-24 RX ADMIN — AMLODIPINE BESYLATE 5 MG: 5 TABLET ORAL at 08:11

## 2018-03-24 RX ADMIN — ACETAMINOPHEN 650 MG: 325 TABLET, FILM COATED ORAL at 18:09

## 2018-03-24 RX ADMIN — LISINOPRIL 5 MG: 5 TABLET ORAL at 08:11

## 2018-03-24 RX ADMIN — PANTOPRAZOLE SODIUM 20 MG: 20 TABLET, DELAYED RELEASE ORAL at 17:58

## 2018-03-24 RX ADMIN — CHLORHEXIDINE GLUCONATE 15 ML: 1.2 RINSE ORAL at 20:35

## 2018-03-24 RX ADMIN — ACETAMINOPHEN 650 MG: 325 TABLET, FILM COATED ORAL at 05:15

## 2018-03-24 RX ADMIN — CARVEDILOL 12.5 MG: 12.5 TABLET, FILM COATED ORAL at 17:58

## 2018-03-24 RX ADMIN — HEPARIN SODIUM 14.1 UNITS/KG/HR: 10000 INJECTION, SOLUTION INTRAVENOUS at 11:13

## 2018-03-24 RX ADMIN — DOCUSATE SODIUM 100 MG: 100 CAPSULE, LIQUID FILLED ORAL at 08:11

## 2018-03-24 RX ADMIN — WARFARIN SODIUM 5 MG: 5 TABLET ORAL at 17:58

## 2018-03-24 NOTE — PROGRESS NOTES
[facility] Internal Medicine Progress Note  Patient: Alex Dumont 71 y o  female   MRN: [id number]  PCP: Clarice Downs DO  Unit/Bed#:  Encounter: [id number]  Date Of Visit: 18    Assessment:    Principal Problem:    COPD (chronic obstructive pulmonary disease) ([place] [address] )  Active Problems: Aortic stenosis, mild    Coronary arteriosclerosis    Hyperlipidemia    Factor V Leiden mutation ([place] [address] )    Morbid obesity due to excess calories ([place] [address] )    Hypertensive crisis    Atrial fibrillation ([place] [address] )    Diabetes ([place] [address] )      Plan:       Hypertensive crisis: off cardene drip; on norvasc, lasix and lisinopril and coreg  Renal artery US negative for stenosis; catecholamines  CT head negative for acute intracranial abnormalities  Mri brain without acute abnormalities    · H&H and platelets stable  Continue trend and monitor  · Factor 5 Leiden; subtherapeutic INR  Heparin bridge  5 mg Coumadin q h s  Follow INR  · ppx vte; heparin/ Coumadin    Type 2 diabetes sliding scale insulin coverage  Factor V leiden deficiency-on heparin gtt transition to coumadin     Atrial fibrillation-on heparin gtt and coumadin    VTE Pharmacologic Prophylaxis:   Pharmacologic: Heparin Drip  Mechanical VTE Prophylaxis in Place: Yes    Patient Centered Rounds: I have performed bedside rounds with nursing staff today  Discussions with Specialists or Other Care Team Provider:     Education and Discussions with Family / Patient:     Time Spent for Care: 30 minutes  More than 50% of total time spent on counseling and coordination of care as described above  Current Length of Stay: 2 day(s)    Current Patient Status: Inpatient   Certification Statement: The patient will continue to require additional inpatient hospital stay due to hypertension    Discharge Plan: pending    Code Status: Level 1 - Full Code      Subjective:   C/o some headache      Objective:     Vitals:   Temp (24hrs), Av 8 °F (36 6 °C), Min:97 6 °F (36 4 °C), Max:97 9 °F (36 6 °C)    HR:  [58-71] 71  Resp:  [17-32] 26  BP: (146-212)/(65-88) 182/75  SpO2:  [94 %-97 %] 97 %  Body mass index is 44 8 kg/m²  Input and Output Summary (last 24 hours): Intake/Output Summary (Last 24 hours) at 03/24/18 1336  Last data filed at 03/24/18 1239   Gross per 24 hour   Intake           827 05 ml   Output             2150 ml   Net         -1322 95 ml       Physical Exam:     Physical Exam   Constitutional: She appears well-developed and well-nourished  HENT:   Head: Normocephalic and atraumatic  Eyes: Pupils are equal, round, and reactive to light  Cardiovascular: Normal rate and regular rhythm  Pulmonary/Chest: Effort normal and breath sounds normal    Abdominal: Soft  Bowel sounds are normal    Musculoskeletal: She exhibits edema  Additional Data:     Labs:      Results from last 7 days  Lab Units 03/24/18  0725   WBC Thousand/uL 7 15   HEMOGLOBIN g/dL 12 3   HEMATOCRIT % 37 3   PLATELETS Thousands/uL 218   NEUTROS PCT % 57   LYMPHS PCT % 27   MONOS PCT % 11   EOS PCT % 4       Results from last 7 days  Lab Units 03/24/18  0725  03/22/18  1324   SODIUM mmol/L 138  < > 140   POTASSIUM mmol/L 4 5  < > 4 3   CHLORIDE mmol/L 102  < > 101   CO2 mmol/L 26  < > 29   BUN mg/dL 26*  < > 21   CREATININE mg/dL 0 91  < > 0 87   CALCIUM mg/dL 8 8  < > 9 4   TOTAL PROTEIN g/dL  --   --  8 5*   BILIRUBIN TOTAL mg/dL  --   --  0 50   ALK PHOS U/L  --   --  49   ALT U/L  --   --  58   AST U/L  --   --  37   GLUCOSE RANDOM mg/dL 180*  < > 127   < > = values in this interval not displayed  Results from last 7 days  Lab Units 03/24/18  0725   INR  1 46*       * I Have Reviewed All Lab Data Listed Above  * Additional Pertinent Lab Tests Reviewed:  All Labs Within Last 24 Hours Reviewed    Imaging:    Imaging Reports Reviewed Today Include:   Imaging Personally Reviewed by Myself Includes:      Recent Cultures (last 7 days):           Last 24 Hours Medication List:     Current Facility-Administered Medications:  acetaminophen 650 mg Oral Q6H PRN Zana Wright PA-C    amLODIPine 5 mg Oral Daily Dru Robbins MD    carvedilol 12 5 mg Oral BID With Meals John Michelle PA-C    chlorhexidine 15 mL Swish & Spit Q12H [facility] MAME Rushing    docusate sodium 100 mg Oral BID Zana Wright PA-C    furosemide 40 mg Oral Daily MAME Rushing    heparin (porcine) 3-20 Units/kg/hr (Order-Specific) Intravenous Titrated AMME Rushing Last Rate: 14 1 Units/kg/hr (03/24/18 1113)   heparin (porcine) 2,000 Units Intravenous PRN ISHAN RushingNP    heparin (porcine) 4,000 Units Intravenous PRN MAME Rushing    hydrALAZINE 20 mg Intravenous Q6H PRN MAME Rushing    insulin lispro 1-6 Units Subcutaneous TID AC Zana Wright PA-C    lisinopril 5 mg Oral Daily Zana Wright PA-C    loratadine 10 mg Oral Daily MAME Rushing    pantoprazole 20 mg Oral BID AC MAME Rushing    pravastatin 40 mg Oral Daily With [address], CRNP    senna 1 tablet Oral HS Zana Wright PA-C         Today, Patient Was Seen By: Hossein Monk MD    ** Please Note: Dragon 360 Dictation voice to text software may have been used in the creation of this document   **

## 2018-03-25 LAB
ALBUMIN SERPL BCP-MCNC: 2.9 G/DL (ref 3.5–5)
ALP SERPL-CCNC: 38 U/L (ref 46–116)
ALT SERPL W P-5'-P-CCNC: 48 U/L (ref 12–78)
ANION GAP SERPL CALCULATED.3IONS-SCNC: 11 MMOL/L (ref 4–13)
APTT PPP: 79 SECONDS (ref 23–35)
AST SERPL W P-5'-P-CCNC: 29 U/L (ref 5–45)
BASOPHILS # BLD AUTO: 0.07 THOUSANDS/ΜL (ref 0–0.1)
BASOPHILS NFR BLD AUTO: 1 % (ref 0–1)
BILIRUB SERPL-MCNC: 0.4 MG/DL (ref 0.2–1)
BUN SERPL-MCNC: 26 MG/DL (ref 5–25)
CALCIUM SERPL-MCNC: 8.6 MG/DL (ref 8.3–10.1)
CHLORIDE SERPL-SCNC: 103 MMOL/L (ref 100–108)
CO2 SERPL-SCNC: 22 MMOL/L (ref 21–32)
CREAT SERPL-MCNC: 1.02 MG/DL (ref 0.6–1.3)
EOSINOPHIL # BLD AUTO: 0.24 THOUSAND/ΜL (ref 0–0.61)
EOSINOPHIL NFR BLD AUTO: 4 % (ref 0–6)
ERYTHROCYTE [DISTWIDTH] IN BLOOD BY AUTOMATED COUNT: 13 % (ref 11.6–15.1)
ERYTHROCYTE [SEDIMENTATION RATE] IN BLOOD: 46 MM/HOUR (ref 0–20)
GFR SERPL CREATININE-BSD FRML MDRD: 55 ML/MIN/1.73SQ M
GLUCOSE SERPL-MCNC: 148 MG/DL (ref 65–140)
GLUCOSE SERPL-MCNC: 152 MG/DL (ref 65–140)
GLUCOSE SERPL-MCNC: 153 MG/DL (ref 65–140)
GLUCOSE SERPL-MCNC: 169 MG/DL (ref 65–140)
GLUCOSE SERPL-MCNC: 188 MG/DL (ref 65–140)
HCT VFR BLD AUTO: 37.5 % (ref 34.8–46.1)
HGB BLD-MCNC: 12.1 G/DL (ref 11.5–15.4)
INR PPP: 1.31 (ref 0.86–1.16)
LYMPHOCYTES # BLD AUTO: 2.44 THOUSANDS/ΜL (ref 0.6–4.47)
LYMPHOCYTES NFR BLD AUTO: 36 % (ref 14–44)
MCH RBC QN AUTO: 29.6 PG (ref 26.8–34.3)
MCHC RBC AUTO-ENTMCNC: 32.3 G/DL (ref 31.4–37.4)
MCV RBC AUTO: 92 FL (ref 82–98)
MONOCYTES # BLD AUTO: 0.74 THOUSAND/ΜL (ref 0.17–1.22)
MONOCYTES NFR BLD AUTO: 11 % (ref 4–12)
NEUTROPHILS # BLD AUTO: 3.24 THOUSANDS/ΜL (ref 1.85–7.62)
NEUTS SEG NFR BLD AUTO: 48 % (ref 43–75)
NRBC BLD AUTO-RTO: 0 /100 WBCS
PLATELET # BLD AUTO: 213 THOUSANDS/UL (ref 149–390)
PMV BLD AUTO: 10.1 FL (ref 8.9–12.7)
POTASSIUM SERPL-SCNC: 4.2 MMOL/L (ref 3.5–5.3)
PROT SERPL-MCNC: 7.4 G/DL (ref 6.4–8.2)
PROTHROMBIN TIME: 16.6 SECONDS (ref 12.1–14.4)
RBC # BLD AUTO: 4.09 MILLION/UL (ref 3.81–5.12)
SODIUM SERPL-SCNC: 136 MMOL/L (ref 136–145)
WBC # BLD AUTO: 6.78 THOUSAND/UL (ref 4.31–10.16)

## 2018-03-25 PROCEDURE — 85025 COMPLETE CBC W/AUTO DIFF WBC: CPT | Performed by: PHYSICIAN ASSISTANT

## 2018-03-25 PROCEDURE — 85652 RBC SED RATE AUTOMATED: CPT | Performed by: GENERAL PRACTICE

## 2018-03-25 PROCEDURE — 83835 ASSAY OF METANEPHRINES: CPT | Performed by: GENERAL PRACTICE

## 2018-03-25 PROCEDURE — 80053 COMPREHEN METABOLIC PANEL: CPT | Performed by: PHYSICIAN ASSISTANT

## 2018-03-25 PROCEDURE — 82384 ASSAY THREE CATECHOLAMINES: CPT | Performed by: GENERAL PRACTICE

## 2018-03-25 PROCEDURE — 85610 PROTHROMBIN TIME: CPT | Performed by: GENERAL PRACTICE

## 2018-03-25 PROCEDURE — 85730 THROMBOPLASTIN TIME PARTIAL: CPT | Performed by: GENERAL PRACTICE

## 2018-03-25 PROCEDURE — 82948 REAGENT STRIP/BLOOD GLUCOSE: CPT

## 2018-03-25 PROCEDURE — 99232 SBSQ HOSP IP/OBS MODERATE 35: CPT | Performed by: NURSE PRACTITIONER

## 2018-03-25 PROCEDURE — 99233 SBSQ HOSP IP/OBS HIGH 50: CPT | Performed by: PSYCHIATRY & NEUROLOGY

## 2018-03-25 RX ORDER — CIPROFLOXACIN 500 MG/1
500 TABLET, FILM COATED ORAL 2 TIMES DAILY
Status: DISCONTINUED | OUTPATIENT
Start: 2018-03-25 | End: 2018-03-26

## 2018-03-25 RX ORDER — WARFARIN SODIUM 7.5 MG/1
7.5 TABLET ORAL
Status: DISCONTINUED | OUTPATIENT
Start: 2018-03-25 | End: 2018-03-26

## 2018-03-25 RX ADMIN — CHLORHEXIDINE GLUCONATE 15 ML: 1.2 RINSE ORAL at 09:47

## 2018-03-25 RX ADMIN — AMLODIPINE BESYLATE 5 MG: 5 TABLET ORAL at 09:52

## 2018-03-25 RX ADMIN — HEPARIN SODIUM 14.1 UNITS/KG/HR: 10000 INJECTION, SOLUTION INTRAVENOUS at 06:30

## 2018-03-25 RX ADMIN — CHLORHEXIDINE GLUCONATE 15 ML: 1.2 RINSE ORAL at 21:54

## 2018-03-25 RX ADMIN — LORATADINE 10 MG: 10 TABLET ORAL at 09:48

## 2018-03-25 RX ADMIN — FUROSEMIDE 40 MG: 40 TABLET ORAL at 09:47

## 2018-03-25 RX ADMIN — LISINOPRIL 5 MG: 5 TABLET ORAL at 09:47

## 2018-03-25 RX ADMIN — PANTOPRAZOLE SODIUM 20 MG: 20 TABLET, DELAYED RELEASE ORAL at 17:16

## 2018-03-25 RX ADMIN — PANTOPRAZOLE SODIUM 20 MG: 20 TABLET, DELAYED RELEASE ORAL at 09:48

## 2018-03-25 RX ADMIN — CARVEDILOL 12.5 MG: 12.5 TABLET, FILM COATED ORAL at 09:47

## 2018-03-25 RX ADMIN — INSULIN LISPRO 1 UNITS: 100 INJECTION, SOLUTION INTRAVENOUS; SUBCUTANEOUS at 13:07

## 2018-03-25 RX ADMIN — INSULIN LISPRO 1 UNITS: 100 INJECTION, SOLUTION INTRAVENOUS; SUBCUTANEOUS at 09:55

## 2018-03-25 RX ADMIN — SENNOSIDES 8.6 MG: 8.6 TABLET, FILM COATED ORAL at 21:56

## 2018-03-25 RX ADMIN — CIPROFLOXACIN 500 MG: 500 TABLET, FILM COATED ORAL at 13:06

## 2018-03-25 RX ADMIN — CARVEDILOL 12.5 MG: 12.5 TABLET, FILM COATED ORAL at 17:16

## 2018-03-25 RX ADMIN — PRAVASTATIN SODIUM 40 MG: 40 TABLET ORAL at 17:16

## 2018-03-25 NOTE — PLAN OF CARE
CARDIOVASCULAR - ADULT     Maintains optimal cardiac output and hemodynamic stability Progressing        DISCHARGE PLANNING - CARE MANAGEMENT     Discharge to post-acute care or home with appropriate resources Progressing        Potential for Falls     Patient will remain free of falls Progressing        Prexisting or High Potential for Compromised Skin Integrity     Skin integrity is maintained or improved Progressing

## 2018-03-25 NOTE — PROGRESS NOTES
Progress Note - Cardiology   Josie Miller 71 y o  female MRN: [id number]  Unit/Bed#:  Encounter: [id number]    Assessment:  1  Hypertensive urgency, Hx HTN:  Improved  2  CAD S/P PCI to LAD and OM in 2000:  No anginal symptoms  3   Aortic stenosis:  Mild per June 2017 echocardiogram   4   Hyperlipidemia:    5  Chronic anticoagulation:  Secondary to factor 5 leiden mutation    Plan:  1  Continue current regimen  2  Ok to switch to Lovenox    Subjective/Objective   Chief Complaint: Headache resolved    Subjective: No chest pain    Objective: No distress    Vitals: /60 (BP Location: Right arm)   Pulse 69   Temp 98 2 °F (36 8 °C) (Oral)   Resp 17   Ht 5' 2" (1 575 m)   Wt 108 kg (238 lb 12 1 oz)   SpO2 95%   BMI 43 67 kg/m²   Vitals:    03/24/18 0747 03/25/18 0545   Weight: 111 kg (244 lb 14 9 oz) 108 kg (238 lb 12 1 oz)     Orthostatic Blood Pressures    Flowsheet Row Most Recent Value   Blood Pressure  160/60 filed at 03/25/2018 0700   Patient Position - Orthostatic VS  Sitting filed at 03/25/2018 0700            Intake/Output Summary (Last 24 hours) at 03/25/18 1005  Last data filed at 03/25/18 0930   Gross per 24 hour   Intake          1299 19 ml   Output             1701 ml   Net          -401 81 ml       Invasive Devices     Peripheral Intravenous Line            Peripheral IV 03/23/18 Right Antecubital 1 day                Review of Systems: Cardiovascular ROS: no chest pain or dyspnea on exertion    Physical Exam:   HEENT: NCAT  Respiratory: CTA  Cardiovascular:  PMI normal, S1-S2 normal, No  Murmurs, thrills, gallops, rubs    Regular rhythm    Lab Results:   CBC with diff:   Results from last 7 days  Lab Units 03/25/18  0541   WBC Thousand/uL 6 78   RBC Million/uL 4 09   HEMOGLOBIN g/dL 12 1   HEMATOCRIT % 37 5   MCV fL 92   MCH pg 29 6   MCHC g/dL 32 3   RDW % 13 0   MPV fL 10 1   PLATELETS Thousands/uL 213     CMP:   Results from last 7 days  Lab Units 03/25/18  0540   SODIUM mmol/L 136   POTASSIUM mmol/L 4 2   CHLORIDE mmol/L 103   CO2 mmol/L 22   ANION GAP mmol/L 11   BUN mg/dL 26*   CREATININE mg/dL 1 02   GLUCOSE RANDOM mg/dL 169*   CALCIUM mg/dL 8 6   AST U/L 29   ALT U/L 48   ALK PHOS U/L 38*   TOTAL PROTEIN g/dL 7 4   BILIRUBIN TOTAL mg/dL 0 40   EGFR ml/min/1 73sq m 55     Troponin:   0  Lab Value Date/Time   TROPONINI <0 02 03/22/2018 2035   TROPONINI <0 02 03/22/2018 1721   TROPONINI <0 02 03/22/2018 1325     BNP:   Results from last 7 days  Lab Units 03/25/18  0540   SODIUM mmol/L 136   POTASSIUM mmol/L 4 2   CHLORIDE mmol/L 103   CO2 mmol/L 22   ANION GAP mmol/L 11   BUN mg/dL 26*   CREATININE mg/dL 1 02   GLUCOSE RANDOM mg/dL 169*   CALCIUM mg/dL 8 6   EGFR ml/min/1 73sq m 55     Coags:   Results from last 7 days  Lab Units 03/25/18  0540   PTT seconds 79*   INR  1 31*     TSH:   Results from last 7 days  Lab Units 03/22/18  1324   TSH 3RD GENERATON uIU/mL 0 625     Magnesium:   Results from last 7 days  Lab Units 03/24/18  0725   MAGNESIUM mg/dL 1 6     Imaging: I have personally reviewed pertinent reports  EKG: NSR with first degree AV block  VTE Pharmacologic Prophylaxis:   VTE Mechanical Prophylaxis:     Counseling / Coordination of Care  Total time spent today 30 minutes  Greater than 50% of total time was spent with the patient and / or family counseling and / or coordination of care   A description of the counseling / coordination of care: 30

## 2018-03-25 NOTE — PROGRESS NOTES
Progress Note - Neurology   Carol Jones 71 y o  female MRN: [id number]  Unit/Bed#:  Encounter: [id number]      Subjective:   Patient currently feels fine, denies any headache, her speech is good, memory is good, she tells me that she had issues with her memory when she was having a headache, no motor or sensory symptoms in upper or lower extremity, MRI scan of the brain without any acute pathology, carotid ultrasound unremarkable     ROS:  Pertinent review of systems as per HPI, otherwise negative     Vitals:   Vitals:    03/25/18 0700   BP: 160/60   Pulse: 69   Resp: 17   Temp: 98 2 °F (36 8 °C)   SpO2: 95%   ,Body mass index is 43 67 kg/m²      MEDS:    Current Facility-Administered Medications:  acetaminophen 650 mg Oral Q6H PRN Oh Maier PA-C    [START ON 3/26/2018] amLODIPine 7 5 mg Oral Daily Silvina Castillo MD    carvedilol 12 5 mg Oral BID With Meals Akash Holt PA-C    chlorhexidine 15 mL Swish & Spit Q12H [address] MAME Reynaga    ciprofloxacin 500 mg Oral BID Silvina Castillo MD    docusate sodium 100 mg Oral BID Oh Maier PA-C    furosemide 40 mg Oral Daily MAME Reynaga    heparin (porcine) 3-20 Units/kg/hr (Order-Specific) Intravenous Titrated MAME Reynaga Last Rate: 14 1 Units/kg/hr (03/25/18 0630)   heparin (porcine) 2,000 Units Intravenous PRN MAME Reynaga    heparin (porcine) 4,000 Units Intravenous PRN MAME Reynaga    hydrALAZINE 20 mg Intravenous Q6H PRN MAME Reynaga    insulin lispro 1-6 Units Subcutaneous TID AC OhMARGARET Crowe-VALERIA    lisinopril 5 mg Oral Daily Margaret MARGARET Maier-VALERIA    loratadine 10 mg Oral Daily MAME Reynaga    pantoprazole 20 mg Oral BID AC MAME Reynaga    pravastatin 40 mg Oral Daily With Kofi [place]    senna 1 tablet Oral HS Margaret Harbour, PA-C    warfarin 7 5 mg Oral Daily (warfarin) oKfi Lundberg MD    :    Physical Exam:  GENERAL:  Well-developed well-nourished [] in no acute distress  HEENT/NECK: Head is atraumatic normocephalic, neck is supple  NEUROLOGIC:  Mental Status: Awake and alert without aphasia, oriented x3, recall is 3/3 both immediate and delayed  Cranial Nerves: Extraocular movements are full  Face is symmetrical  Motor:  5/5 proximally and distally both in upper and lower ext  Coordination:  Finger to nose is intact bilateral  Reflexes:  2+ and symmetric          Lab Results: I have personally reviewed pertinent reports  Imaging Studies: I have personally reviewed pertinent films in PACS      Assessment/plan,  Questionable speech and memory difficulty currently patient is asymptomatic  Her MRI scan of the brain and carotid ultrasound are unremarkable  Would recommend repeating sed rate, no temporal artery tenderness, continue with current treatment  Other management as per primary team   Case discussed with primary team, we will sign off, she can follow up with us as an outpatient  Dante Gordon MD  3/25/2018,11:18 AM    Dictation voice to text software has been used in the creation of this document

## 2018-03-26 ENCOUNTER — ANTICOAG VISIT (OUTPATIENT)
Dept: CARDIOLOGY CLINIC | Facility: CLINIC | Age: 73
End: 2018-03-26

## 2018-03-26 DIAGNOSIS — I87.2 VENOUS INSUFFICIENCY (CHRONIC) (PERIPHERAL): Primary | ICD-10-CM

## 2018-03-26 LAB
ALBUMIN SERPL BCP-MCNC: 3 G/DL (ref 3.5–5)
ALP SERPL-CCNC: 40 U/L (ref 46–116)
ALT SERPL W P-5'-P-CCNC: 56 U/L (ref 12–78)
ANION GAP SERPL CALCULATED.3IONS-SCNC: 10 MMOL/L (ref 4–13)
APTT PPP: 70 SECONDS (ref 23–35)
AST SERPL W P-5'-P-CCNC: 39 U/L (ref 5–45)
BACTERIA UR CULT: ABNORMAL
BASOPHILS # BLD AUTO: 0.06 THOUSANDS/ΜL (ref 0–0.1)
BASOPHILS NFR BLD AUTO: 1 % (ref 0–1)
BILIRUB SERPL-MCNC: 0.4 MG/DL (ref 0.2–1)
BUN SERPL-MCNC: 27 MG/DL (ref 5–25)
CALCIUM SERPL-MCNC: 8.9 MG/DL (ref 8.3–10.1)
CHLORIDE SERPL-SCNC: 102 MMOL/L (ref 100–108)
CO2 SERPL-SCNC: 26 MMOL/L (ref 21–32)
CREAT SERPL-MCNC: 0.9 MG/DL (ref 0.6–1.3)
EOSINOPHIL # BLD AUTO: 0.24 THOUSAND/ΜL (ref 0–0.61)
EOSINOPHIL NFR BLD AUTO: 4 % (ref 0–6)
ERYTHROCYTE [DISTWIDTH] IN BLOOD BY AUTOMATED COUNT: 13 % (ref 11.6–15.1)
GFR SERPL CREATININE-BSD FRML MDRD: 65 ML/MIN/1.73SQ M
GLUCOSE SERPL-MCNC: 164 MG/DL (ref 65–140)
GLUCOSE SERPL-MCNC: 171 MG/DL (ref 65–140)
GLUCOSE SERPL-MCNC: 175 MG/DL (ref 65–140)
GLUCOSE SERPL-MCNC: 180 MG/DL (ref 65–140)
GLUCOSE SERPL-MCNC: 202 MG/DL (ref 65–140)
HCT VFR BLD AUTO: 39.7 % (ref 34.8–46.1)
HGB BLD-MCNC: 12.6 G/DL (ref 11.5–15.4)
INR PPP: 1.38 (ref 0.86–1.16)
LYMPHOCYTES # BLD AUTO: 2.17 THOUSANDS/ΜL (ref 0.6–4.47)
LYMPHOCYTES NFR BLD AUTO: 34 % (ref 14–44)
MCH RBC QN AUTO: 28.9 PG (ref 26.8–34.3)
MCHC RBC AUTO-ENTMCNC: 31.7 G/DL (ref 31.4–37.4)
MCV RBC AUTO: 91 FL (ref 82–98)
MONOCYTES # BLD AUTO: 0.84 THOUSAND/ΜL (ref 0.17–1.22)
MONOCYTES NFR BLD AUTO: 13 % (ref 4–12)
NEUTROPHILS # BLD AUTO: 2.98 THOUSANDS/ΜL (ref 1.85–7.62)
NEUTS SEG NFR BLD AUTO: 47 % (ref 43–75)
NRBC BLD AUTO-RTO: 0 /100 WBCS
PLATELET # BLD AUTO: 201 THOUSANDS/UL (ref 149–390)
PMV BLD AUTO: 10.7 FL (ref 8.9–12.7)
POTASSIUM SERPL-SCNC: 4.2 MMOL/L (ref 3.5–5.3)
PROT SERPL-MCNC: 7.5 G/DL (ref 6.4–8.2)
PROTHROMBIN TIME: 17.3 SECONDS (ref 12.1–14.4)
RBC # BLD AUTO: 4.36 MILLION/UL (ref 3.81–5.12)
SODIUM SERPL-SCNC: 138 MMOL/L (ref 136–145)
WBC # BLD AUTO: 6.34 THOUSAND/UL (ref 4.31–10.16)

## 2018-03-26 PROCEDURE — 85730 THROMBOPLASTIN TIME PARTIAL: CPT | Performed by: GENERAL PRACTICE

## 2018-03-26 PROCEDURE — 80053 COMPREHEN METABOLIC PANEL: CPT | Performed by: PHYSICIAN ASSISTANT

## 2018-03-26 PROCEDURE — 99232 SBSQ HOSP IP/OBS MODERATE 35: CPT | Performed by: GENERAL PRACTICE

## 2018-03-26 PROCEDURE — 82948 REAGENT STRIP/BLOOD GLUCOSE: CPT

## 2018-03-26 PROCEDURE — 85025 COMPLETE CBC W/AUTO DIFF WBC: CPT | Performed by: PHYSICIAN ASSISTANT

## 2018-03-26 PROCEDURE — 85610 PROTHROMBIN TIME: CPT | Performed by: GENERAL PRACTICE

## 2018-03-26 PROCEDURE — 92522 EVALUATE SPEECH PRODUCTION: CPT

## 2018-03-26 RX ORDER — WARFARIN SODIUM 7.5 MG/1
7.5 TABLET ORAL
Status: DISCONTINUED | OUTPATIENT
Start: 2018-03-26 | End: 2018-03-27

## 2018-03-26 RX ORDER — CIPROFLOXACIN 500 MG/1
500 TABLET, FILM COATED ORAL 2 TIMES DAILY
Status: DISCONTINUED | OUTPATIENT
Start: 2018-03-26 | End: 2018-03-27 | Stop reason: HOSPADM

## 2018-03-26 RX ORDER — AMLODIPINE BESYLATE 5 MG/1
5 TABLET ORAL DAILY
Status: DISCONTINUED | OUTPATIENT
Start: 2018-03-26 | End: 2018-03-27 | Stop reason: HOSPADM

## 2018-03-26 RX ADMIN — PRAVASTATIN SODIUM 40 MG: 40 TABLET ORAL at 16:24

## 2018-03-26 RX ADMIN — LORATADINE 10 MG: 10 TABLET ORAL at 09:46

## 2018-03-26 RX ADMIN — INSULIN LISPRO 1 UNITS: 100 INJECTION, SOLUTION INTRAVENOUS; SUBCUTANEOUS at 06:51

## 2018-03-26 RX ADMIN — CARVEDILOL 12.5 MG: 12.5 TABLET, FILM COATED ORAL at 16:24

## 2018-03-26 RX ADMIN — CIPROFLOXACIN HYDROCHLORIDE 500 MG: 500 TABLET, FILM COATED ORAL at 18:32

## 2018-03-26 RX ADMIN — WARFARIN SODIUM 7.5 MG: 7.5 TABLET ORAL at 18:32

## 2018-03-26 RX ADMIN — ENOXAPARIN SODIUM 105 MG: 120 INJECTION SUBCUTANEOUS at 21:55

## 2018-03-26 RX ADMIN — CARVEDILOL 12.5 MG: 12.5 TABLET, FILM COATED ORAL at 09:45

## 2018-03-26 RX ADMIN — INSULIN LISPRO 2 UNITS: 100 INJECTION, SOLUTION INTRAVENOUS; SUBCUTANEOUS at 13:35

## 2018-03-26 RX ADMIN — PANTOPRAZOLE SODIUM 20 MG: 20 TABLET, DELAYED RELEASE ORAL at 06:32

## 2018-03-26 RX ADMIN — CHLORHEXIDINE GLUCONATE 15 ML: 1.2 RINSE ORAL at 21:55

## 2018-03-26 RX ADMIN — PANTOPRAZOLE SODIUM 20 MG: 20 TABLET, DELAYED RELEASE ORAL at 16:24

## 2018-03-26 RX ADMIN — FUROSEMIDE 40 MG: 40 TABLET ORAL at 09:45

## 2018-03-26 RX ADMIN — CHLORHEXIDINE GLUCONATE 15 ML: 1.2 RINSE ORAL at 09:45

## 2018-03-26 RX ADMIN — AMLODIPINE BESYLATE 5 MG: 5 TABLET ORAL at 09:45

## 2018-03-26 RX ADMIN — CIPROFLOXACIN HYDROCHLORIDE 500 MG: 500 TABLET, FILM COATED ORAL at 09:45

## 2018-03-26 RX ADMIN — INSULIN LISPRO 1 UNITS: 100 INJECTION, SOLUTION INTRAVENOUS; SUBCUTANEOUS at 18:33

## 2018-03-26 RX ADMIN — HEPARIN SODIUM 14.1 UNITS/KG/HR: 10000 INJECTION, SOLUTION INTRAVENOUS at 01:00

## 2018-03-26 RX ADMIN — ENOXAPARIN SODIUM 105 MG: 120 INJECTION SUBCUTANEOUS at 09:50

## 2018-03-26 RX ADMIN — LISINOPRIL 5 MG: 5 TABLET ORAL at 09:45

## 2018-03-26 NOTE — PROGRESS NOTES
[address] Internal Medicine Progress Note  Patient: Nasrin Hernandez 71 y o  female   MRN: [id number]  PCP: Malena Duggan DO  Unit/Bed#: -01 Encounter: [id number]  Date Of Visit: 18    Assessment:    Principal Problem:    COPD (chronic obstructive pulmonary disease) ([place] [address] )  Active Problems: Aortic stenosis, mild    Coronary arteriosclerosis    Hyperlipidemia    Factor V Leiden mutation ([place] [address] )    Morbid obesity due to excess calories ([place] [address] )    Hypertensive crisis    Atrial fibrillation ([place] [address] )    Diabetes ([place] [address] )      Plan:    Hypertensive crisis: off cardene drip; on norvasc, lasix and lisinopril and coreg  Renal artery US negative for stenosis; catecholamines pending  CT head negative for acute intracranial abnormalities  Mri brain without acute abnormalities     · H&H and platelets stable   Continue trend and monitor  · Factor 5 Leiden; subtherapeutic INR   Heparin/lovenox; INR  · ppx vte; heparin/ Coumadin     Type 2 diabetes sliding scale insulin coverage      Factor V leiden deficiency-h/o dvt-on lovenox  transition to coumadin     Atrial fibrillation-on lovenox and coumadin    ?uti-on cipro      VTE Pharmacologic Prophylaxis:   Pharmacologic: Enoxaparin (Lovenox)  Mechanical VTE Prophylaxis in Place: Yes    Patient Centered Rounds: I have performed bedside rounds with nursing staff today  Discussions with Specialists or Other Care Team Provider:     Education and Discussions with Family / Patient:     Time Spent for Care: 30 minutes  More than 50% of total time spent on counseling and coordination of care as described above  Current Length of Stay: 4 day(s)    Current Patient Status: Inpatient   Certification Statement: The patient will continue to require additional inpatient hospital stay due to awaiting speech eval    Discharge Plan: home    Code Status: Level 1 - Full Code      Subjective:   Feels better' walking in the halls      Objective:     Vitals:   Temp (24hrs), Av 9 °F (36 6 °C), Min:97 4 °F (36 3 °C), Max:98 5 °F (36 9 °C)    HR:  [71-79] 78  Resp:  [17-20] 18  BP: (130-151)/(60-73) 147/71  SpO2:  [96 %-97 %] 96 %  Body mass index is 43 67 kg/m²  Input and Output Summary (last 24 hours): Intake/Output Summary (Last 24 hours) at 03/26/18 0931  Last data filed at 03/25/18 1901   Gross per 24 hour   Intake           499 91 ml   Output                0 ml   Net           499 91 ml       Physical Exam:     Physical Exam   Constitutional: She appears well-developed and well-nourished  HENT:   Head: Normocephalic and atraumatic  Eyes: Pupils are equal, round, and reactive to light  Cardiovascular:   irreg irreg   Pulmonary/Chest: Effort normal and breath sounds normal    Abdominal: Soft  Bowel sounds are normal    Musculoskeletal: She exhibits no edema  Additional Data:     Labs:      Results from last 7 days  Lab Units 03/26/18  0514   WBC Thousand/uL 6 34   HEMOGLOBIN g/dL 12 6   HEMATOCRIT % 39 7   PLATELETS Thousands/uL 201   NEUTROS PCT % 47   LYMPHS PCT % 34   MONOS PCT % 13*   EOS PCT % 4       Results from last 7 days  Lab Units 03/26/18  0514   SODIUM mmol/L 138   POTASSIUM mmol/L 4 2   CHLORIDE mmol/L 102   CO2 mmol/L 26   BUN mg/dL 27*   CREATININE mg/dL 0 90   CALCIUM mg/dL 8 9   TOTAL PROTEIN g/dL 7 5   BILIRUBIN TOTAL mg/dL 0 40   ALK PHOS U/L 40*   ALT U/L 56   AST U/L 39   GLUCOSE RANDOM mg/dL 171*       Results from last 7 days  Lab Units 03/26/18  0514   INR  1 38*       * I Have Reviewed All Lab Data Listed Above  * Additional Pertinent Lab Tests Reviewed:  All Labs Within Last 24 Hours Reviewed    Imaging:    Imaging Reports Reviewed Today Include:   Imaging Personally Reviewed by Myself Includes:      Recent Cultures (last 7 days):       Results from last 7 days  Lab Units 03/24/18 1933   URINE CULTURE  80,000-89,000 cfu/ml Escherichia coli*       Last 24 Hours Medication List:     Current Facility-Administered Medications:  acetaminophen 650 mg Oral Q6H PRN [place] LEOLA Maier    [facility] Hold] amLODIPine 7 5 mg Oral Daily Silvina Castillo MD    carvedilol 12 5 mg Oral BID With Meals Akash Holt PA-C    chlorhexidine 15 mL Swish & Spit Q12H [address] [facility] Hold] ciprofloxacin 500 mg Oral BID Silvina Castillo MD    docusate sodium 100 mg Oral BID [place] LEOLA Maier    furosemide 40 mg Oral Daily MAME Reynaga    heparin (porcine) 3-20 Units/kg/hr (Order-Specific) Intravenous Titrated MAME Reynaga Last Rate: 14 1 Units/kg/hr (03/26/18 0100)   heparin (porcine) 2,000 Units Intravenous PRN MAME Reynaga    heparin (porcine) 4,000 Units Intravenous PRN MAME Reynaga    hydrALAZINE 20 mg Intravenous Q6H PRN MAME Reynaga    insulin lispro 1-6 Units Subcutaneous TID AC Ohus Darrion PA-C    lisinopril 5 mg Oral Daily Ohus Darrion PA-C    loratadine 10 mg Oral Daily MAME Reynaga    pantoprazole 20 mg Oral BID AC MAME Reynaga    pravastatin 40 mg Oral Daily With MAME Tse    senna 1 tablet Oral HS Ohus Darrion PA-C    [facility] Hold] warfarin 7 5 mg Oral Daily (warfarin) Kofi Lundberg MD         Today, Patient Was Seen By: Kofi Lundberg MD    ** Please Note: Dragon 360 Dictation voice to text software may have been used in the creation of this document   **

## 2018-03-26 NOTE — PLAN OF CARE
Problem: Potential for Falls  Goal: Patient will remain free of falls  INTERVENTIONS:  - Assess patient frequently for physical needs  -  Identify cognitive and physical deficits and behaviors that affect risk of falls  -  [place] fall precautions as indicated by assessment   - Educate patient/family on patient safety including physical limitations  - Instruct patient to call for assistance with activity based on assessment  - Modify environment to reduce risk of injury  - Consider OT/PT consult to assist with strengthening/mobility   Outcome: Progressing      Problem: CARDIOVASCULAR - ADULT  Goal: Maintains optimal cardiac output and hemodynamic stability  INTERVENTIONS:  - Monitor I/O, vital signs and rhythm  - Monitor for S/S and trends of decreased cardiac output i e  bleeding, hypotension  - Administer and titrate ordered vasoactive medications to optimize hemodynamic stability  - Assess quality of pulses, skin color and temperature  - Assess for signs of decreased coronary artery perfusion - ex   Angina  - Instruct patient to report change in severity of symptoms   Outcome: Progressing      Problem: Prexisting or High Potential for Compromised Skin Integrity  Goal: Skin integrity is maintained or improved  INTERVENTIONS:  - Identify patients at risk for skin breakdown  - Assess and monitor skin integrity  - Assess and monitor nutrition and hydration status  - Monitor labs (i e  albumin)  - Assess for incontinence   - Turn and reposition patient  - Assist with mobility/ambulation  - Relieve pressure over bony prominences  - Avoid friction and shearing  - Provide appropriate hygiene as needed including keeping skin clean and dry  - Evaluate need for skin moisturizer/barrier cream  - Collaborate with interdisciplinary team (i e  Nutrition, Rehabilitation, etc )   - Patient/family teaching   Outcome: Progressing      Problem: DISCHARGE PLANNING - CARE MANAGEMENT  Goal: Discharge to post-acute care or home with appropriate resources  INTERVENTIONS:  - Conduct assessment to determine patient/family and health care team treatment goals, and need for post-acute services based on payer coverage, community resources, and patient preferences, and barriers to discharge  - Address psychosocial, clinical, and financial barriers to discharge as identified in assessment in conjunction with the patient/family and health care team  - Arrange appropriate level of post-acute services according to patients   needs and preference and payer coverage in collaboration with the physician and health care team  - Communicate with and update the patient/family, physician, and health care team regarding progress on the discharge plan  - Arrange appropriate transportation to post-acute venues   Outcome: Progressing

## 2018-03-26 NOTE — SPEECH THERAPY NOTE
Speech-Language Pathology   Speech/Language/Cognitive Evaluation        Patient Name: Brooke Tadeo    VAWFM'A Date: 3/26/2018     Problem List  Patient Active Problem List   Diagnosis    Aortic stenosis, mild    Coronary arteriosclerosis    Hyperlipidemia    Hypertension    Asymptomatic varicose veins    [facility]    Morbid obesity due to excess calories ([facility] )    Hypertensive crisis    Atrial fibrillation ([facility] )    COPD (chronic obstructive pulmonary disease) ([facility] )    Diabetes ([facility] )       Past Medical History  Past Medical History:   Diagnosis Date    Aortic ejection murmur     AS (aortic stenosis)     Atrial fibrillation (HCC)     COPD (chronic obstructive pulmonary disease) ([facility]     Diabetes mellitus (HCC)     Exertional shortness of breath     HTN (hypertension)     Hyperlipidemia     Obesity        Past Surgical History  Past Surgical History:   Procedure Laterality Date    ADENOIDECTOMY      CARDIAC CATHETERIZATION      HYSTERECTOMY      KNEE SURGERY      ROTATOR CUFF REPAIR      TONSILLECTOMY           Current Medical Status  Pt is a 71 y o  female who presented to [facility] with hypertensive crisis  ST consulted at request of pt and family due to reported language and cognitive changes  Pt's daughter Stephannie Merlin and niece Beryle Dolores present for session  Pt and family report that approximately 2 weeks ago, the pt began to present with anomia, difficulty with problem-solving (e g , was unable to balance her checkbook, which she had never had trouble with in the past), and speech errors, for example, saying "Her arm is short" to refer to someone being unable to reach something   She had also been having severe headaches and visual disturbances in the few days prior to coming to the hospital  The pt reports that she now feels better and that she feels she is largely back to normal, but she is worried that she is incorrect in this assessment and that the prior symptoms may happen again  Pt's niece (who converses with pt by phone daily) also states that the pt does not seem to be completely back to normal  Neurology has evaluated the pt and signed off yesterday, reporting her asymptomatic  Imaging Studies:  MRI 3/23 No acute intracranial pathology  No acute ischemia, mass or hemorrhage  Mild cerebral volume loss  Single left frontal white matter lesion likely representing chronic microangiopathic change  CT Head 3/22 No acute intracranial abnormality  Social/Education/Vocational Hx:  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, across the street from an adult son  Social:  Sociopragmatics appropriate in spontaneous speech and structured tasks  Orientation:  Pt is oriented to person, place, situation, and time  Auditory Comprehension:  One step commands: 100%  Two step commands: 100%  Complex or 3 step commands: 100%  Object ID: 100%  Basic y/n ?'s: 100%  Complex y/n ?'s: 100%  Paragraph Comprehension: Recalled 6/6 major details and multiple minor details (ABCD Story Recall task)      Reading Comprehension:  Paragraph comprehension: Intact  Functional comprehension: Intact      Oral Expression:  Not formally tested, however, no errors observed in spontaneous speech throughout 45-minute lengthy and detailed conversation about recent and past medical history and other events in pt's life  Motor Speech: Within normal limits, no symptoms concerning for dysarthria or apraxia  Cognitive -linguistic skills:  Problem solvin% for everyday problem-solving situations  Sequencin% for everyday/common tasks and events  Immediate memory: 100% nonverbal repetition   Delayed memory: 100% after 1-minute filled delay, with some self-cueing      Summary   Pt presented with speech, language, and cognitive function within normal limits at the time of this evaluation   Extensive education provided to pt and family at their request re: s/sx CVA, s/sx potentially concerning for neurological deficit, normal cognitive functioning in elderly adults, importance of monitoring and controlling blood pressure to assist in stroke prevention  D/w pt and family that if deficits recur and other medical issues are well-controlled, pt may benefit from outpatient consultation with neurologist     Pt and family with many questions regarding medication, imaging, and whether she needs further testing or consults as part of outpatient followup  Deferred these questions to physician team        Plan  No ST needs at this time

## 2018-03-26 NOTE — CASE MANAGEMENT
Continued Stay Review    Date: 3/26    Vital Signs: /71 (BP Location: Right arm)   Pulse 78   Temp (!) 97 4 °F (36 3 °C) (Oral)   Resp 18   Ht 5' 2" (1 575 m)   Wt 108 kg (238 lb 12 1 oz)   SpO2 96%   BMI 43 67 kg/m²     Medications:   Scheduled Meds:   Current Facility-Administered Medications:  acetaminophen 650 mg Oral Q6H PRN Alix Hart PA-C   amLODIPine 5 mg Oral Daily Ladanlisa Castillo MD   carvedilol 12 5 mg Oral BID With Meals Belle Diallo PA-C   chlorhexidine 15 mL Swish & Spit Q12H [facility] MAME Barriga   ciprofloxacin 500 mg Oral BID [facility] MD Anna   docusate sodium 100 mg Oral BID Alix Hart PA-C   enoxaparin 1 mg/kg Subcutaneous Q12H [facility] MD Anna   furosemide 40 mg Oral Daily MAME Barriga   hydrALAZINE 20 mg Intravenous Q6H PRN MAME Barriga   insulin lispro 1-6 Units Subcutaneous TID AC Alix Hart PA-C   lisinopril 5 mg Oral Daily Alix Hart PA-C   loratadine 10 mg Oral Daily MAME Barriga   pantoprazole 20 mg Oral BID AC MAME Barriga   pravastatin 40 mg Oral Daily With [employer], MAME   senna 1 tablet Oral HS Alix Hart PA-C   warfarin 7 5 mg Oral Daily (warfarin) [facility] MD Anna     Continuous Infusions:    PRN Meds:   acetaminophen    hydrALAZINE    Abnormal Labs/Diagnostic Results: BUN creat  27  0 90, alk phos 40, gluc 171    Age/Sex: 71 y o  female     Assessment/Plan: Assessment:     Principal Problem:    COPD (chronic obstructive pulmonary disease) (HCC)  Active Problems:     Aortic stenosis, mild    Coronary arteriosclerosis    Hyperlipidemia    Factor V Leiden mutation ([facility] )    Morbid obesity due to excess calories ([facility] )    Hypertensive crisis    Atrial fibrillation ([facility] )    Diabetes ([facility] )        Plan:     Hypertensive crisis: off cardene drip; on norvasc, lasix and lisinopril and coreg  Renal artery US negative for stenosis; catecholamines pending  CT head negative for acute intracranial abnormalities  Mri brain without acute abnormalities     · H&H and platelets stable   Continue trend and monitor  · Factor 5 Leiden; subtherapeutic INR   Heparin/lovenox; INR  · ppx vte; heparin/ Coumadin     Type 2 diabetes sliding scale insulin coverage      Factor V leiden deficiency-h/o dvt-on lovenox  transition to coumadin     Atrial fibrillation-on lovenox and coumadin     ?uti-on cipro        VTE Pharmacologic Prophylaxis:   Pharmacologic: Enoxaparin (Lovenox)  Mechanical VTE Prophylaxis in Place: Yes     Patient Centered Rounds: I have performed bedside rounds with nursing staff today      Discussions with Specialists or Other Care Team Provider:      Education and Discussions with Family / Patient:      Time Spent for Care: 30 minutes    More than 50% of total time spent on counseling and coordination of care as described above      Current Length of Stay: 4 day(s)     Current Patient Status: Inpatient   Certification Statement: The patient will continue to require additional inpatient hospital stay due to awaiting speech eval     Discharge Plan: home

## 2018-03-27 VITALS
TEMPERATURE: 98.2 F | RESPIRATION RATE: 18 BRPM | SYSTOLIC BLOOD PRESSURE: 156 MMHG | HEART RATE: 78 BPM | WEIGHT: 239.64 LBS | HEIGHT: 62 IN | DIASTOLIC BLOOD PRESSURE: 69 MMHG | OXYGEN SATURATION: 95 % | BODY MASS INDEX: 44.1 KG/M2

## 2018-03-27 PROBLEM — N30.00 ACUTE CYSTITIS WITHOUT HEMATURIA: Status: ACTIVE | Noted: 2018-03-27

## 2018-03-27 LAB
ANION GAP SERPL CALCULATED.3IONS-SCNC: 11 MMOL/L (ref 4–13)
B BURGDOR DNA SPEC QL NAA+PROBE: NEGATIVE
BASOPHILS # BLD AUTO: 0.06 THOUSANDS/ΜL (ref 0–0.1)
BASOPHILS NFR BLD AUTO: 1 % (ref 0–1)
BUN SERPL-MCNC: 29 MG/DL (ref 5–25)
CALCIUM SERPL-MCNC: 9.1 MG/DL (ref 8.3–10.1)
CHLORIDE SERPL-SCNC: 102 MMOL/L (ref 100–108)
CO2 SERPL-SCNC: 25 MMOL/L (ref 21–32)
CREAT SERPL-MCNC: 0.99 MG/DL (ref 0.6–1.3)
EOSINOPHIL # BLD AUTO: 0.26 THOUSAND/ΜL (ref 0–0.61)
EOSINOPHIL NFR BLD AUTO: 4 % (ref 0–6)
ERYTHROCYTE [DISTWIDTH] IN BLOOD BY AUTOMATED COUNT: 12.9 % (ref 11.6–15.1)
GFR SERPL CREATININE-BSD FRML MDRD: 58 ML/MIN/1.73SQ M
GLUCOSE SERPL-MCNC: 172 MG/DL (ref 65–140)
GLUCOSE SERPL-MCNC: 174 MG/DL (ref 65–140)
GLUCOSE SERPL-MCNC: 196 MG/DL (ref 65–140)
HCT VFR BLD AUTO: 38.3 % (ref 34.8–46.1)
HGB BLD-MCNC: 12.4 G/DL (ref 11.5–15.4)
INR PPP: 1.24 (ref 0.86–1.16)
LYMPHOCYTES # BLD AUTO: 2.35 THOUSANDS/ΜL (ref 0.6–4.47)
LYMPHOCYTES NFR BLD AUTO: 35 % (ref 14–44)
MCH RBC QN AUTO: 29.2 PG (ref 26.8–34.3)
MCHC RBC AUTO-ENTMCNC: 32.4 G/DL (ref 31.4–37.4)
MCV RBC AUTO: 90 FL (ref 82–98)
MONOCYTES # BLD AUTO: 0.91 THOUSAND/ΜL (ref 0.17–1.22)
MONOCYTES NFR BLD AUTO: 14 % (ref 4–12)
NEUTROPHILS # BLD AUTO: 3 THOUSANDS/ΜL (ref 1.85–7.62)
NEUTS SEG NFR BLD AUTO: 45 % (ref 43–75)
NRBC BLD AUTO-RTO: 0 /100 WBCS
PLATELET # BLD AUTO: 196 THOUSANDS/UL (ref 149–390)
PMV BLD AUTO: 10 FL (ref 8.9–12.7)
POTASSIUM SERPL-SCNC: 4.3 MMOL/L (ref 3.5–5.3)
PROTHROMBIN TIME: 15.9 SECONDS (ref 12.1–14.4)
RBC # BLD AUTO: 4.25 MILLION/UL (ref 3.81–5.12)
SODIUM SERPL-SCNC: 138 MMOL/L (ref 136–145)
WBC # BLD AUTO: 6.64 THOUSAND/UL (ref 4.31–10.16)

## 2018-03-27 PROCEDURE — 82948 REAGENT STRIP/BLOOD GLUCOSE: CPT

## 2018-03-27 PROCEDURE — 85610 PROTHROMBIN TIME: CPT | Performed by: GENERAL PRACTICE

## 2018-03-27 PROCEDURE — 99239 HOSP IP/OBS DSCHRG MGMT >30: CPT | Performed by: INTERNAL MEDICINE

## 2018-03-27 PROCEDURE — 80048 BASIC METABOLIC PNL TOTAL CA: CPT | Performed by: GENERAL PRACTICE

## 2018-03-27 PROCEDURE — 85025 COMPLETE CBC W/AUTO DIFF WBC: CPT | Performed by: GENERAL PRACTICE

## 2018-03-27 PROCEDURE — 94760 N-INVAS EAR/PLS OXIMETRY 1: CPT

## 2018-03-27 RX ORDER — CIPROFLOXACIN 500 MG/1
500 TABLET, FILM COATED ORAL 2 TIMES DAILY
Qty: 6 TABLET | Refills: 0 | Status: SHIPPED | OUTPATIENT
Start: 2018-03-27 | End: 2018-03-30

## 2018-03-27 RX ORDER — WARFARIN SODIUM 5 MG/1
10 TABLET ORAL
Status: DISCONTINUED | OUTPATIENT
Start: 2018-03-27 | End: 2018-03-27 | Stop reason: HOSPADM

## 2018-03-27 RX ORDER — CARVEDILOL 12.5 MG/1
12.5 TABLET ORAL 2 TIMES DAILY WITH MEALS
Qty: 30 TABLET | Refills: 2 | Status: SHIPPED | OUTPATIENT
Start: 2018-03-27

## 2018-03-27 RX ORDER — AMLODIPINE BESYLATE 5 MG/1
5 TABLET ORAL DAILY
Qty: 30 TABLET | Refills: 2 | Status: SHIPPED | OUTPATIENT
Start: 2018-03-28

## 2018-03-27 RX ORDER — LISINOPRIL 5 MG/1
5 TABLET ORAL DAILY
Qty: 30 TABLET | Refills: 2 | Status: SHIPPED | OUTPATIENT
Start: 2018-03-27

## 2018-03-27 RX ADMIN — CIPROFLOXACIN HYDROCHLORIDE 500 MG: 500 TABLET, FILM COATED ORAL at 08:54

## 2018-03-27 RX ADMIN — INSULIN LISPRO 1 UNITS: 100 INJECTION, SOLUTION INTRAVENOUS; SUBCUTANEOUS at 08:50

## 2018-03-27 RX ADMIN — PANTOPRAZOLE SODIUM 20 MG: 20 TABLET, DELAYED RELEASE ORAL at 06:40

## 2018-03-27 RX ADMIN — ENOXAPARIN SODIUM 105 MG: 120 INJECTION SUBCUTANEOUS at 08:53

## 2018-03-27 RX ADMIN — FUROSEMIDE 40 MG: 40 TABLET ORAL at 08:55

## 2018-03-27 RX ADMIN — LORATADINE 10 MG: 10 TABLET ORAL at 08:54

## 2018-03-27 RX ADMIN — LISINOPRIL 5 MG: 5 TABLET ORAL at 08:55

## 2018-03-27 RX ADMIN — CARVEDILOL 12.5 MG: 12.5 TABLET, FILM COATED ORAL at 08:54

## 2018-03-27 RX ADMIN — DOCUSATE SODIUM 100 MG: 100 CAPSULE, LIQUID FILLED ORAL at 08:54

## 2018-03-27 RX ADMIN — AMLODIPINE BESYLATE 5 MG: 5 TABLET ORAL at 08:54

## 2018-03-27 RX ADMIN — CHLORHEXIDINE GLUCONATE 15 ML: 1.2 RINSE ORAL at 08:55

## 2018-03-27 RX ADMIN — INSULIN LISPRO 2 UNITS: 100 INJECTION, SOLUTION INTRAVENOUS; SUBCUTANEOUS at 12:40

## 2018-03-27 NOTE — DISCHARGE SUMMARY
Discharge Summary - [facility] Internal Medicine    Patient Information: Lea Garcia 71 y o  female MRN: [id number]  Unit/Bed#: -01 Encounter: [id number]    Discharging Physician / Practitioner: Wallace Jett MD  PCP: Charlette Sanchez DO  Admission Date: 3/22/2018  Discharge Date: 03/27/18    Reason for Admission:  Hypertensive crisis/COPD exacerbation    Discharge Diagnoses:     Principal Problem:    COPD (chronic obstructive pulmonary disease) ([address] )  Active Problems: Aortic stenosis, mild    Coronary arteriosclerosis    Hyperlipidemia    Factor V Leiden mutation ([facility] )    Morbid obesity due to excess calories ([facility] )    Hypertensive crisis    Atrial fibrillation ([facility] )    Diabetes ([facility] )    Acute cystitis without hematuria  Resolved Problems:    * No resolved hospital problems  *    Present on Admission:   Hypertensive crisis   Aortic stenosis, mild   Coronary arteriosclerosis   Factor V Leiden mutation ([facility] )   Hyperlipidemia   Morbid obesity due to excess calories (HCC)   Atrial fibrillation (HCC)   COPD (chronic obstructive pulmonary disease) ([facility] )   Diabetes ([facility] )   Acute cystitis without hematuria    Consultations During Hospital Stay:  · Patient initially was I believe under critical care service  · Cardiology  · Neurology    Procedures Performed:     · CT head, MRI brain, renal artery ultrasound    Significant Findings:     · Nothing major    Incidental Findings:   · As above     Test Results Pending at Discharge (will require follow up): · None     Outpatient Tests Requested:  · PT INR-patient on Lovenox  Lovenox to be discontinued when INR more than 2    Complications:  None    Hospital Course:     Lea Garcia is a 71 y o  female patient who originally presented to the hospital on 3/22/2018 due to headache and also she was more short of breath  She was not acting right  Also has some visual changes    Recently, her antihypertensive medications were adjusted by her cardiologist   Her blood pressure otherwise has been stable  She was admitted to the hospital   Had workup done by Cardiology and Neurology service  She was on Cardene drip to bring her blood pressure down  Her antihypertensive medications have been adjusted/added  She is back to her baseline  Also likely has mild urinary tract infection  She would complete course of antibiotics  Patient has history of factor V Laiden mutation  She is chronically on anticoagulation, however, her INR was that therapeutic  She is now on Lovenox  She would go home on Lovenox to overlap with Coumadin until her INR is therapeutic  Plan of care was discussed with patient and family  All questions were answered    During her stay, it was also thought that she may be in atrial fibrillation  Her EKG shows that she is in sinus rhythm  Maybe she was having some paroxysmal atrial fibrillation because of elevated blood pressure/COPD exacerbation  Currently she is in sinus rhythm     Condition at Discharge: good     Discharge Day Visit / Exam:     Subjective:  Feels pretty good today  Higinio Ramírez to be discharged home  Vitals: Blood Pressure: 156/69 (03/27/18 0700)  Pulse: 78 (03/27/18 0700)  Temperature: 98 2 °F (36 8 °C) (03/27/18 0700)  Temp Source: Oral (03/27/18 0700)  Respirations: 18 (03/27/18 0700)  Height: 5' 2" (157 5 cm) (03/25/18 1619)  Weight - Scale: 109 kg (239 lb 10 2 oz) (03/27/18 0600)  SpO2: 95 % (03/27/18 0700)  Exam:        Vital signs are reviewed as above  Patient lying in bed  She is morbidly obese  S1 and S2 audible  Awake and alert  Oriented x3  Has varicose veins  Respiratory:  Not in any respiratory distress  Has poor air entry in general             Discharge instructions/Information to patient and family:   See after visit summary for information provided to patient and family        Provisions for Follow-Up Care:  See after visit summary for information related to follow-up care and any pertinent home health orders  Disposition:     Home    For Discharges to [facility] SNF:   · Not Applicable to this Patient - Not Applicable to this Patient    Planned Readmission:  None     Discharge Statement:  I spent more than 32 minutes discharging the patient  This time was spent on the day of discharge  I had direct contact with the patient on the day of discharge  Greater than 50% of the total time was spent examining patient, answering all patient questions, arranging and discussing plan of care with patient as well as directly providing post-discharge instructions  Additional time then spent on discharge activities  Discharge Medications:  See after visit summary for reconciled discharge medications provided to patient and family  ** Please Note: Dragon 360 Dictation voice to text software may have been used in the creation of this document   **

## 2018-03-27 NOTE — SOCIAL WORK
CM met with patient in her room  Patient was informed that her lovenox was filled at her [facility] pharmacy and her copay is $6 00  Patient is agreeable to [address] [place] [address] servicin  [place] was Informed  CM informed of the IMM and provided a copy  IMM is in the chart  Patient's daughter will transport patient at home today  DARRELL made an outpatient CM referral for patient  Patient is agreeable to that support  Nurse and doctor consulted

## 2018-03-28 LAB
METANEPH FREE SERPL-MCNC: 20 PG/ML (ref 0–62)
NORMETANEPHRINE SERPL-MCNC: 44 PG/ML (ref 0–145)

## 2018-03-29 ENCOUNTER — TELEPHONE (OUTPATIENT)
Dept: INTERNAL MEDICINE CLINIC | Facility: CLINIC | Age: 73
End: 2018-03-29

## 2018-03-29 ENCOUNTER — TELEPHONE (OUTPATIENT)
Dept: CARDIOLOGY CLINIC | Facility: CLINIC | Age: 73
End: 2018-03-29

## 2018-03-29 LAB
CREAT UR-MCNC: 56.8 MG/DL
DOPAMINE 24H UR-MRATE: <30 PG/ML (ref 0–48)
DOPAMINE UR-MCNC: 110 UG/L
DOPAMINE/CREAT UR: 194 UG/G CREAT (ref 0–348)
EPINEPH PLAS-MCNC: 34 PG/ML (ref 0–62)
EPINEPH UR-MCNC: 3 UG/L
EPINEPH/CREAT UR: 5 UG/G CREAT (ref 0–19)
NOREPINEPH PLAS-MCNC: 484 PG/ML (ref 0–874)
NOREPINEPH UR-MCNC: 45 UG/L
NOREPINEPH/CREAT UR: 79 UG/G CREAT (ref 0–111)

## 2018-03-29 NOTE — TELEPHONE ENCOUNTER
Per tl Vk6 was paged last night with results  Patient was recently in hospital and also on Lovenox  Patient to continue same dose - recheck in 2-3 days  Detailed message was left for patient last night by tl  I left another detailed message for patient - advised to call back if any questions

## 2018-03-29 NOTE — TELEPHONE ENCOUNTER
I COULDN'T START A NEW TASK BUT ELIOT B[facility]MAN [facility] HAD CALLED & SAID THAT PT IS REFUSING THEIR SERVICES   ANY QUESTIONS ELIOT CAN BE REACHED [phone]

## 2018-03-30 ENCOUNTER — NON-APPOINTMENT (OUTPATIENT)
Age: 73
End: 2018-03-30

## 2018-03-30 ENCOUNTER — APPOINTMENT (OUTPATIENT)
Dept: CARDIOLOGY | Facility: CLINIC | Age: 73
End: 2018-03-30
Payer: MEDICARE

## 2018-03-30 VITALS
OXYGEN SATURATION: 98 % | RESPIRATION RATE: 17 BRPM | SYSTOLIC BLOOD PRESSURE: 142 MMHG | BODY MASS INDEX: 43.06 KG/M2 | HEIGHT: 62 IN | WEIGHT: 234 LBS | DIASTOLIC BLOOD PRESSURE: 82 MMHG | HEART RATE: 88 BPM

## 2018-03-30 DIAGNOSIS — Z86.19 PERSONAL HISTORY OF OTHER INFECTIOUS AND PARASITIC DISEASES: ICD-10-CM

## 2018-03-30 DIAGNOSIS — Z86.718 PERSONAL HISTORY OF OTHER VENOUS THROMBOSIS AND EMBOLISM: ICD-10-CM

## 2018-03-30 DIAGNOSIS — I25.2 OLD MYOCARDIAL INFARCTION: ICD-10-CM

## 2018-03-30 PROBLEM — Z00.00 ENCOUNTER FOR PREVENTIVE HEALTH EXAMINATION: Status: ACTIVE | Noted: 2018-03-30

## 2018-03-30 PROCEDURE — 99205 OFFICE O/P NEW HI 60 MIN: CPT

## 2018-03-30 PROCEDURE — 93000 ELECTROCARDIOGRAM COMPLETE: CPT

## 2018-04-01 ENCOUNTER — EMERGENCY (EMERGENCY)
Facility: HOSPITAL | Age: 73
LOS: 1 days | Discharge: HOME | End: 2018-04-01
Admitting: INTERNAL MEDICINE
Payer: MEDICARE

## 2018-04-01 PROCEDURE — 85027 COMPLETE CBC AUTOMATED: CPT

## 2018-04-01 PROCEDURE — 72131 CT LUMBAR SPINE W/O DYE: CPT

## 2018-04-01 PROCEDURE — 99284 EMERGENCY DEPT VISIT MOD MDM: CPT

## 2018-04-01 PROCEDURE — 72131 CT LUMBAR SPINE W/O DYE: CPT | Mod: 26

## 2018-04-01 PROCEDURE — 80048 BASIC METABOLIC PNL TOTAL CA: CPT

## 2018-04-01 PROCEDURE — 85610 PROTHROMBIN TIME: CPT

## 2018-04-01 PROCEDURE — 36415 COLL VENOUS BLD VENIPUNCTURE: CPT

## 2018-04-01 PROCEDURE — 93970 EXTREMITY STUDY: CPT | Mod: 26

## 2018-04-01 PROCEDURE — 93970 EXTREMITY STUDY: CPT

## 2018-04-01 PROCEDURE — 85730 THROMBOPLASTIN TIME PARTIAL: CPT

## 2018-04-04 LAB — INR PPP: 3.2

## 2018-04-09 LAB — INR PPP: 1.4

## 2018-04-17 LAB — INR PPP: 1.3

## 2018-04-18 ENCOUNTER — MEDICATION RENEWAL (OUTPATIENT)
Age: 73
End: 2018-04-18

## 2018-04-30 LAB — INR PPP: 2.5

## 2018-05-04 ENCOUNTER — NON-APPOINTMENT (OUTPATIENT)
Age: 73
End: 2018-05-04

## 2018-05-04 ENCOUNTER — APPOINTMENT (OUTPATIENT)
Dept: CARDIOLOGY | Facility: CLINIC | Age: 73
End: 2018-05-04
Payer: MEDICARE

## 2018-05-04 VITALS
HEART RATE: 95 BPM | DIASTOLIC BLOOD PRESSURE: 75 MMHG | WEIGHT: 240 LBS | SYSTOLIC BLOOD PRESSURE: 124 MMHG | HEIGHT: 62 IN | RESPIRATION RATE: 17 BRPM | BODY MASS INDEX: 44.16 KG/M2 | OXYGEN SATURATION: 97 %

## 2018-05-04 PROCEDURE — 93000 ELECTROCARDIOGRAM COMPLETE: CPT

## 2018-05-04 PROCEDURE — 99214 OFFICE O/P EST MOD 30 MIN: CPT

## 2018-06-27 DIAGNOSIS — E87.6 HYPOKALEMIA: ICD-10-CM

## 2018-06-27 RX ORDER — POTASSIUM CHLORIDE 20 MEQ/1
TABLET, EXTENDED RELEASE ORAL
Qty: 180 TABLET | Refills: 3 | Status: SHIPPED | OUTPATIENT
Start: 2018-06-27 | End: 2020-01-25

## 2018-06-29 ENCOUNTER — RX RENEWAL (OUTPATIENT)
Age: 73
End: 2018-06-29

## 2018-07-02 ENCOUNTER — RX RENEWAL (OUTPATIENT)
Age: 73
End: 2018-07-02

## 2018-07-06 LAB
INR PPP: 1.7
INR PPP: 2
INR PPP: 2.7
INR PPP: 3
INR PPP: 3.2

## 2018-07-20 ENCOUNTER — MEDICATION RENEWAL (OUTPATIENT)
Age: 73
End: 2018-07-20

## 2018-08-06 LAB
INR PPP: 2.3
INR PPP: 2.5

## 2018-08-10 ENCOUNTER — APPOINTMENT (OUTPATIENT)
Dept: CARDIOLOGY | Facility: CLINIC | Age: 73
End: 2018-08-10
Payer: MEDICARE

## 2018-08-10 ENCOUNTER — NON-APPOINTMENT (OUTPATIENT)
Age: 73
End: 2018-08-10

## 2018-08-10 VITALS
RESPIRATION RATE: 17 BRPM | SYSTOLIC BLOOD PRESSURE: 139 MMHG | WEIGHT: 234 LBS | HEART RATE: 78 BPM | HEIGHT: 62 IN | BODY MASS INDEX: 43.06 KG/M2 | DIASTOLIC BLOOD PRESSURE: 90 MMHG | OXYGEN SATURATION: 98 %

## 2018-08-10 PROCEDURE — 93000 ELECTROCARDIOGRAM COMPLETE: CPT

## 2018-08-10 PROCEDURE — 99214 OFFICE O/P EST MOD 30 MIN: CPT

## 2018-08-16 LAB — INR PPP: 1.4

## 2018-08-22 LAB — INR PPP: 1.5

## 2018-08-29 LAB — INR PPP: 4

## 2018-08-30 LAB — INR PPP: 2.1

## 2018-09-03 ENCOUNTER — RX RENEWAL (OUTPATIENT)
Age: 73
End: 2018-09-03

## 2018-09-10 LAB — INR PPP: 3.8

## 2018-09-11 ENCOUNTER — MEDICATION RENEWAL (OUTPATIENT)
Age: 73
End: 2018-09-11

## 2018-10-05 ENCOUNTER — MEDICATION RENEWAL (OUTPATIENT)
Age: 73
End: 2018-10-05

## 2018-10-05 LAB
INR PPP: 1.7
INR PPP: 2.3

## 2018-10-09 ENCOUNTER — MEDICATION RENEWAL (OUTPATIENT)
Age: 73
End: 2018-10-09

## 2018-11-06 LAB — INR PPP: 1.4

## 2018-11-15 LAB — INR PPP: 1.9

## 2018-12-21 ENCOUNTER — APPOINTMENT (OUTPATIENT)
Dept: CARDIOLOGY | Facility: CLINIC | Age: 73
End: 2018-12-21
Payer: MEDICARE

## 2018-12-21 ENCOUNTER — NON-APPOINTMENT (OUTPATIENT)
Age: 73
End: 2018-12-21

## 2018-12-21 VITALS
HEART RATE: 77 BPM | WEIGHT: 240 LBS | DIASTOLIC BLOOD PRESSURE: 85 MMHG | HEIGHT: 62 IN | OXYGEN SATURATION: 97 % | SYSTOLIC BLOOD PRESSURE: 167 MMHG | BODY MASS INDEX: 44.16 KG/M2 | RESPIRATION RATE: 17 BRPM

## 2018-12-21 PROCEDURE — 99215 OFFICE O/P EST HI 40 MIN: CPT

## 2018-12-21 PROCEDURE — 93000 ELECTROCARDIOGRAM COMPLETE: CPT

## 2018-12-21 PROCEDURE — 93306 TTE W/DOPPLER COMPLETE: CPT

## 2019-01-08 ENCOUNTER — TELEPHONE (OUTPATIENT)
Dept: CARDIOLOGY CLINIC | Facility: CLINIC | Age: 74
End: 2019-01-08

## 2019-01-08 NOTE — TELEPHONE ENCOUNTER
Rec'd a call from [place] that the pt called back and said that she has not been our pt for over a year, that she transferred to a Dr in [place]  so why am I  calling  I did introduce myself and where I was calling from and she never said that she switched Dr's  I attempted to call the pt back to confirm that her message was rec'd and to make sure she fully understands where I was calling from but she did not answer and vm was full

## 2019-01-08 NOTE — TELEPHONE ENCOUNTER
Called pt because she was overdue to have her inr checked  She said that she is using the home machine but we never got her results  She said her last check was 12/21 and she was 2 9  She said she will check again today or tomorrow  Aware to call us if we don't call her  Told her to make sure she calls it into [facility] and gave her the # to call it in  If after that we still don't get the results, I told the pt I will then call [facility] to figure out why the results are not being rec'd

## 2019-01-11 NOTE — TELEPHONE ENCOUNTER
Per pt she is being followed by a  in [facility] so I removed her from the over due list

## 2019-01-19 ENCOUNTER — RX RENEWAL (OUTPATIENT)
Age: 74
End: 2019-01-19

## 2019-01-25 ENCOUNTER — MEDICATION RENEWAL (OUTPATIENT)
Age: 74
End: 2019-01-25

## 2019-01-25 LAB — INR PPP: 3.8

## 2019-01-31 ENCOUNTER — RX RENEWAL (OUTPATIENT)
Age: 74
End: 2019-01-31

## 2019-02-01 ENCOUNTER — MEDICATION RENEWAL (OUTPATIENT)
Age: 74
End: 2019-02-01

## 2019-04-08 LAB
INR PPP: 3
INR PPP: 3

## 2019-04-24 ENCOUNTER — RX RENEWAL (OUTPATIENT)
Age: 74
End: 2019-04-24

## 2019-04-26 ENCOUNTER — NON-APPOINTMENT (OUTPATIENT)
Age: 74
End: 2019-04-26

## 2019-04-26 ENCOUNTER — APPOINTMENT (OUTPATIENT)
Dept: CARDIOLOGY | Facility: CLINIC | Age: 74
End: 2019-04-26
Payer: MEDICARE

## 2019-04-26 VITALS
OXYGEN SATURATION: 99 % | SYSTOLIC BLOOD PRESSURE: 173 MMHG | RESPIRATION RATE: 17 BRPM | WEIGHT: 240 LBS | DIASTOLIC BLOOD PRESSURE: 84 MMHG | HEART RATE: 80 BPM | BODY MASS INDEX: 44.16 KG/M2 | HEIGHT: 62 IN

## 2019-04-26 PROCEDURE — 99215 OFFICE O/P EST HI 40 MIN: CPT

## 2019-04-26 PROCEDURE — 93000 ELECTROCARDIOGRAM COMPLETE: CPT

## 2019-05-14 ENCOUNTER — APPOINTMENT (OUTPATIENT)
Dept: CARDIOLOGY | Facility: CLINIC | Age: 74
End: 2019-05-14
Payer: MEDICARE

## 2019-05-14 PROCEDURE — 78451 HT MUSCLE IMAGE SPECT SING: CPT | Mod: PD

## 2019-05-14 PROCEDURE — 93015 CV STRESS TEST SUPVJ I&R: CPT | Mod: PD

## 2019-05-14 PROCEDURE — A9500: CPT | Mod: PD

## 2019-05-16 ENCOUNTER — APPOINTMENT (OUTPATIENT)
Dept: CARDIOLOGY | Facility: CLINIC | Age: 74
End: 2019-05-16

## 2019-05-17 ENCOUNTER — TRANSCRIPTION ENCOUNTER (OUTPATIENT)
Age: 74
End: 2019-05-17

## 2019-05-17 ENCOUNTER — INPATIENT (INPATIENT)
Facility: HOSPITAL | Age: 74
LOS: 0 days | Discharge: HOME | DRG: 247 | End: 2019-05-18
Attending: INTERNAL MEDICINE | Admitting: INTERNAL MEDICINE
Payer: MEDICARE

## 2019-05-17 VITALS
HEIGHT: 61 IN | HEART RATE: 74 BPM | RESPIRATION RATE: 14 BRPM | WEIGHT: 240.08 LBS | OXYGEN SATURATION: 98 % | TEMPERATURE: 98 F

## 2019-05-17 DIAGNOSIS — Z90.710 ACQUIRED ABSENCE OF BOTH CERVIX AND UTERUS: Chronic | ICD-10-CM

## 2019-05-17 DIAGNOSIS — Z90.49 ACQUIRED ABSENCE OF OTHER SPECIFIED PARTS OF DIGESTIVE TRACT: Chronic | ICD-10-CM

## 2019-05-17 DIAGNOSIS — Z90.89 ACQUIRED ABSENCE OF OTHER ORGANS: Chronic | ICD-10-CM

## 2019-05-17 DIAGNOSIS — Z87.39 PERSONAL HISTORY OF OTHER DISEASES OF THE MUSCULOSKELETAL SYSTEM AND CONNECTIVE TISSUE: Chronic | ICD-10-CM

## 2019-05-17 DIAGNOSIS — Z98.890 OTHER SPECIFIED POSTPROCEDURAL STATES: Chronic | ICD-10-CM

## 2019-05-17 DIAGNOSIS — T14.90XA INJURY, UNSPECIFIED, INITIAL ENCOUNTER: Chronic | ICD-10-CM

## 2019-05-17 DIAGNOSIS — R07.89 OTHER CHEST PAIN: ICD-10-CM

## 2019-05-17 LAB
ALBUMIN SERPL ELPH-MCNC: 4.6 G/DL — SIGNIFICANT CHANGE UP (ref 3.3–5)
ALP SERPL-CCNC: 48 U/L — SIGNIFICANT CHANGE UP (ref 40–120)
ALT FLD-CCNC: 40 U/L — SIGNIFICANT CHANGE UP (ref 10–45)
ANION GAP SERPL CALC-SCNC: 15 MMOL/L — SIGNIFICANT CHANGE UP (ref 5–17)
APTT BLD: 30.7 SEC — SIGNIFICANT CHANGE UP (ref 27.5–36.3)
AST SERPL-CCNC: 30 U/L — SIGNIFICANT CHANGE UP (ref 10–40)
BILIRUB SERPL-MCNC: 0.6 MG/DL — SIGNIFICANT CHANGE UP (ref 0.2–1.2)
BUN SERPL-MCNC: 26 MG/DL — HIGH (ref 7–23)
CALCIUM SERPL-MCNC: 10.1 MG/DL — SIGNIFICANT CHANGE UP (ref 8.4–10.5)
CHLORIDE SERPL-SCNC: 99 MMOL/L — SIGNIFICANT CHANGE UP (ref 96–108)
CO2 SERPL-SCNC: 25 MMOL/L — SIGNIFICANT CHANGE UP (ref 22–31)
CREAT SERPL-MCNC: 0.87 MG/DL — SIGNIFICANT CHANGE UP (ref 0.5–1.3)
GLUCOSE BLDC GLUCOMTR-MCNC: 132 MG/DL — HIGH (ref 70–99)
GLUCOSE BLDC GLUCOMTR-MCNC: 134 MG/DL — HIGH (ref 70–99)
GLUCOSE BLDC GLUCOMTR-MCNC: 141 MG/DL — HIGH (ref 70–99)
GLUCOSE SERPL-MCNC: 146 MG/DL — HIGH (ref 70–99)
HBA1C BLD-MCNC: 7.6 % — HIGH (ref 4–5.6)
HCT VFR BLD CALC: 38.2 % — SIGNIFICANT CHANGE UP (ref 34.5–45)
HGB BLD-MCNC: 12.7 G/DL — SIGNIFICANT CHANGE UP (ref 11.5–15.5)
INR BLD: 1.33 RATIO — HIGH (ref 0.88–1.16)
MCHC RBC-ENTMCNC: 28.9 PG — SIGNIFICANT CHANGE UP (ref 27–34)
MCHC RBC-ENTMCNC: 33.3 GM/DL — SIGNIFICANT CHANGE UP (ref 32–36)
MCV RBC AUTO: 86.9 FL — SIGNIFICANT CHANGE UP (ref 80–100)
PLATELET # BLD AUTO: 250 K/UL — SIGNIFICANT CHANGE UP (ref 150–400)
POTASSIUM SERPL-MCNC: 4.4 MMOL/L — SIGNIFICANT CHANGE UP (ref 3.5–5.3)
POTASSIUM SERPL-SCNC: 4.4 MMOL/L — SIGNIFICANT CHANGE UP (ref 3.5–5.3)
PROT SERPL-MCNC: 8.3 G/DL — SIGNIFICANT CHANGE UP (ref 6–8.3)
PROTHROM AB SERPL-ACNC: 15.3 SEC — HIGH (ref 10–12.9)
RBC # BLD: 4.4 M/UL — SIGNIFICANT CHANGE UP (ref 3.8–5.2)
RBC # FLD: 12.2 % — SIGNIFICANT CHANGE UP (ref 10.3–14.5)
SODIUM SERPL-SCNC: 139 MMOL/L — SIGNIFICANT CHANGE UP (ref 135–145)
WBC # BLD: 7.3 K/UL — SIGNIFICANT CHANGE UP (ref 3.8–10.5)
WBC # FLD AUTO: 7.3 K/UL — SIGNIFICANT CHANGE UP (ref 3.8–10.5)

## 2019-05-17 PROCEDURE — 99152 MOD SED SAME PHYS/QHP 5/>YRS: CPT | Mod: GC

## 2019-05-17 PROCEDURE — 93010 ELECTROCARDIOGRAM REPORT: CPT

## 2019-05-17 PROCEDURE — 92928 PRQ TCAT PLMT NTRAC ST 1 LES: CPT | Mod: RC,GC

## 2019-05-17 PROCEDURE — 93458 L HRT ARTERY/VENTRICLE ANGIO: CPT | Mod: 26,XU,GC

## 2019-05-17 RX ORDER — DEXTROSE 50 % IN WATER 50 %
15 SYRINGE (ML) INTRAVENOUS ONCE
Refills: 0 | Status: DISCONTINUED | OUTPATIENT
Start: 2019-05-17 | End: 2019-05-18

## 2019-05-17 RX ORDER — ASPIRIN/CALCIUM CARB/MAGNESIUM 324 MG
1 TABLET ORAL
Qty: 0 | Refills: 0 | DISCHARGE
Start: 2019-05-17

## 2019-05-17 RX ORDER — LISINOPRIL 2.5 MG/1
1 TABLET ORAL
Qty: 0 | Refills: 0 | DISCHARGE

## 2019-05-17 RX ORDER — WARFARIN SODIUM 2.5 MG/1
1 TABLET ORAL
Qty: 0 | Refills: 0 | DISCHARGE

## 2019-05-17 RX ORDER — LISINOPRIL 2.5 MG/1
1 TABLET ORAL
Qty: 0 | Refills: 0 | DISCHARGE
Start: 2019-05-17

## 2019-05-17 RX ORDER — DEXTROSE 50 % IN WATER 50 %
12.5 SYRINGE (ML) INTRAVENOUS ONCE
Refills: 0 | Status: DISCONTINUED | OUTPATIENT
Start: 2019-05-17 | End: 2019-05-18

## 2019-05-17 RX ORDER — SODIUM CHLORIDE 9 MG/ML
3 INJECTION INTRAMUSCULAR; INTRAVENOUS; SUBCUTANEOUS EVERY 8 HOURS
Refills: 0 | Status: DISCONTINUED | OUTPATIENT
Start: 2019-05-17 | End: 2019-05-18

## 2019-05-17 RX ORDER — CLOPIDOGREL BISULFATE 75 MG/1
75 TABLET, FILM COATED ORAL DAILY
Refills: 0 | Status: DISCONTINUED | OUTPATIENT
Start: 2019-05-18 | End: 2019-05-18

## 2019-05-17 RX ORDER — GLUCAGON INJECTION, SOLUTION 0.5 MG/.1ML
1 INJECTION, SOLUTION SUBCUTANEOUS ONCE
Refills: 0 | Status: DISCONTINUED | OUTPATIENT
Start: 2019-05-17 | End: 2019-05-18

## 2019-05-17 RX ORDER — CLOPIDOGREL BISULFATE 75 MG/1
1 TABLET, FILM COATED ORAL
Qty: 30 | Refills: 11
Start: 2019-05-17 | End: 2020-05-10

## 2019-05-17 RX ORDER — DEXTROSE 50 % IN WATER 50 %
25 SYRINGE (ML) INTRAVENOUS ONCE
Refills: 0 | Status: DISCONTINUED | OUTPATIENT
Start: 2019-05-17 | End: 2019-05-18

## 2019-05-17 RX ORDER — ROSUVASTATIN CALCIUM 5 MG/1
10 TABLET ORAL AT BEDTIME
Refills: 0 | Status: DISCONTINUED | OUTPATIENT
Start: 2019-05-17 | End: 2019-05-18

## 2019-05-17 RX ORDER — AMLODIPINE BESYLATE 2.5 MG/1
5 TABLET ORAL DAILY
Refills: 0 | Status: DISCONTINUED | OUTPATIENT
Start: 2019-05-17 | End: 2019-05-18

## 2019-05-17 RX ORDER — FUROSEMIDE 40 MG
40 TABLET ORAL DAILY
Refills: 0 | Status: DISCONTINUED | OUTPATIENT
Start: 2019-05-17 | End: 2019-05-18

## 2019-05-17 RX ORDER — ASPIRIN/CALCIUM CARB/MAGNESIUM 324 MG
81 TABLET ORAL DAILY
Refills: 0 | Status: DISCONTINUED | OUTPATIENT
Start: 2019-05-18 | End: 2019-05-18

## 2019-05-17 RX ORDER — METFORMIN HYDROCHLORIDE 850 MG/1
1 TABLET ORAL
Qty: 0 | Refills: 0 | DISCHARGE

## 2019-05-17 RX ORDER — LISINOPRIL 2.5 MG/1
5 TABLET ORAL DAILY
Refills: 0 | Status: DISCONTINUED | OUTPATIENT
Start: 2019-05-17 | End: 2019-05-18

## 2019-05-17 RX ORDER — SODIUM CHLORIDE 9 MG/ML
1000 INJECTION, SOLUTION INTRAVENOUS
Refills: 0 | Status: DISCONTINUED | OUTPATIENT
Start: 2019-05-17 | End: 2019-05-18

## 2019-05-17 RX ORDER — ROSUVASTATIN CALCIUM 5 MG/1
1 TABLET ORAL
Qty: 0 | Refills: 0 | DISCHARGE
Start: 2019-05-17

## 2019-05-17 RX ORDER — INSULIN LISPRO 100/ML
VIAL (ML) SUBCUTANEOUS AT BEDTIME
Refills: 0 | Status: DISCONTINUED | OUTPATIENT
Start: 2019-05-17 | End: 2019-05-18

## 2019-05-17 RX ORDER — ROSUVASTATIN CALCIUM 5 MG/1
0.5 TABLET ORAL
Qty: 0 | Refills: 0 | DISCHARGE

## 2019-05-17 RX ORDER — CARVEDILOL PHOSPHATE 80 MG/1
12.5 CAPSULE, EXTENDED RELEASE ORAL EVERY 12 HOURS
Refills: 0 | Status: DISCONTINUED | OUTPATIENT
Start: 2019-05-17 | End: 2019-05-18

## 2019-05-17 RX ORDER — PANTOPRAZOLE SODIUM 20 MG/1
40 TABLET, DELAYED RELEASE ORAL
Refills: 0 | Status: DISCONTINUED | OUTPATIENT
Start: 2019-05-17 | End: 2019-05-18

## 2019-05-17 RX ORDER — INSULIN LISPRO 100/ML
VIAL (ML) SUBCUTANEOUS
Refills: 0 | Status: DISCONTINUED | OUTPATIENT
Start: 2019-05-17 | End: 2019-05-18

## 2019-05-17 RX ADMIN — ROSUVASTATIN CALCIUM 10 MILLIGRAM(S): 5 TABLET ORAL at 22:10

## 2019-05-17 RX ADMIN — SODIUM CHLORIDE 3 MILLILITER(S): 9 INJECTION INTRAMUSCULAR; INTRAVENOUS; SUBCUTANEOUS at 16:17

## 2019-05-17 RX ADMIN — CARVEDILOL PHOSPHATE 12.5 MILLIGRAM(S): 80 CAPSULE, EXTENDED RELEASE ORAL at 22:10

## 2019-05-17 RX ADMIN — SODIUM CHLORIDE 3 MILLILITER(S): 9 INJECTION INTRAMUSCULAR; INTRAVENOUS; SUBCUTANEOUS at 22:07

## 2019-05-17 NOTE — H&P CARDIOLOGY - PMH
CAD (coronary artery disease)    Diabetes    DVT (deep venous thrombosis)    Factor V Leiden    HTN (hypertension)    Lyme disease    Malignant neoplasm of breast (female)    Myocardial infarct, old    Obesity CAD (coronary artery disease)    Diabetes    DVT (deep venous thrombosis)  LLE  Factor V Leiden    History of coronary artery stent placement  x3 [facility]  HTN (hypertension)    Lyme disease    Malignant neoplasm of breast (female)  lymph nodes removed and lumpectomy (left) 2015  Myocardial infarct, old  2001  Obesity

## 2019-05-17 NOTE — DISCHARGE NOTE PROVIDER - CARE PROVIDERS DIRECT ADDRESSES
[email or web address]

## 2019-05-17 NOTE — DISCHARGE NOTE PROVIDER - NSDCCPTREATMENT_GEN_ALL_CORE_FT
PRINCIPAL PROCEDURE  Procedure: Left heart cardiac cath  Findings and Treatment: PRINCIPAL PROCEDURE  Procedure: Placement of coronary artery stent  Findings and Treatment: stent to the prox RCA

## 2019-05-17 NOTE — DISCHARGE NOTE PROVIDER - NSDCCPCAREPLAN_GEN_ALL_CORE_FT
PRINCIPAL DISCHARGE DIAGNOSIS  Diagnosis: CAD (coronary atherosclerotic disease)  Assessment and Plan of Treatment: Low salt, low fat diet.   Weight management.   Take medications as prescribed.    No smoking.  Follow up appointments with your doctor(s)  as instruced.      SECONDARY DISCHARGE DIAGNOSES  Diagnosis: Anticoagulated on Coumadin  Assessment and Plan of Treatment: please continue HOLD your COUMADIN/WARFARIN as per Dr Irizarry  in preparation for upcoming cardiac catherization on Tuesday May 21st    Diagnosis: Diabetes mellitus  Assessment and Plan of Treatment: Continue to follow with your primary care MD or your endocrinologist. Discuss what the goal hemoglobin A1C level is for you.  Follow a heart healthy diabetic diet. If you check your fingerstick glucose at home, call your MD if it is greater than 250mg/dL on 2 occasions or less than 100mg/dL on 2 occasions. Know signs of low blood sugar, such as: dizziness, shakiness, sweating, confusion, hunger, nervousness- drink 4 ounces apple juice if occurs and call your doctor. Know early signs of high blood sugar, such as: frequent urination, increased thirst, blurry vision, fatigue, headache - call your doctor if this occurs.    Diagnosis: HLD (hyperlipidemia)  Assessment and Plan of Treatment: Continue with your cholesterol medications. Eat a heart healthy diet that is low in saturated fats and salt, and includes whole grains, fruits, vegetables and lean protein; exercise regularly (consult with your physician or cardiologist first); maintain a heart healthy weight; if you smoke - quit (A resource to help you stop smoking is the [facility] [employer] Control – phone number [phone].). Continue to follow with your primary physician or cardiologist.    Diagnosis: HTN (hypertension)  Assessment and Plan of Treatment: Continue with your blood pressure medications; eat a heart healthy diet with low salt diet; exercise regularly (consult with your physician or cardiologist first); maintain a heart healthy weight; if you smoke - quit (A resource to help you stop smoking is the [facility] [employer] Control – phone number [phone].); include healthy ways to manage stress. Continue to follow with your primary care physician or cardiologist. PRINCIPAL DISCHARGE DIAGNOSIS  Diagnosis: CAD (coronary atherosclerotic disease)  Assessment and Plan of Treatment: Do not stop your aspirin or Plavix unless instructed to do so by your cardiologist, they help keep your stented arteries open.   Low salt, low fat diet.   Weight management.   Take medications as prescribed.    No smoking.  Follow up appointments with your doctor(s)  as instruced.      SECONDARY DISCHARGE DIAGNOSES  Diagnosis: Anticoagulated on Coumadin  Assessment and Plan of Treatment: please continue HOLD your COUMADIN/WARFARIN as per Dr Irizarry  in preparation for upcoming cardiac catherization on Tuesday May 21st    Diagnosis: Diabetes mellitus  Assessment and Plan of Treatment: Continue to follow with your primary care MD or your endocrinologist. Discuss what the goal hemoglobin A1C level is for you.  Follow a heart healthy diabetic diet. If you check your fingerstick glucose at home, call your MD if it is greater than 250mg/dL on 2 occasions or less than 100mg/dL on 2 occasions. Know signs of low blood sugar, such as: dizziness, shakiness, sweating, confusion, hunger, nervousness- drink 4 ounces apple juice if occurs and call your doctor. Know early signs of high blood sugar, such as: frequent urination, increased thirst, blurry vision, fatigue, headache - call your doctor if this occurs.    Diagnosis: HLD (hyperlipidemia)  Assessment and Plan of Treatment: Continue with your cholesterol medications. Eat a heart healthy diet that is low in saturated fats and salt, and includes whole grains, fruits, vegetables and lean protein; exercise regularly (consult with your physician or cardiologist first); maintain a heart healthy weight; if you smoke - quit (A resource to help you stop smoking is the [facility] [place] [employer] Control – phone number [phone].). Continue to follow with your primary physician or cardiologist.    Diagnosis: HTN (hypertension)  Assessment and Plan of Treatment: Continue with your blood pressure medications; eat a heart healthy diet with low salt diet; exercise regularly (consult with your physician or cardiologist first); maintain a heart healthy weight; if you smoke - quit (A resource to help you stop smoking is the [facility] [employer] for [employer] Control – phone number [phone].); include healthy ways to manage stress. Continue to follow with your primary care physician or cardiologist.

## 2019-05-17 NOTE — DISCHARGE NOTE PROVIDER - NSDCFUADDINST_GEN_ALL_CORE_FT
No heavy lifting for 2 weeks, no strenuous activity  ( pushing/ pulling) no driving for x 2 days,  you may shower 24 hours following procedure but no bathing or swimming for x1  week, no strenuous sex for x 1 week & follow up with your cardiologist in 1-2 week

## 2019-05-17 NOTE — H&P CARDIOLOGY - PSH
Blunt trauma  right shoulder injury with hardware, limited ROM  H/O lumpectomy  lymph nodes removed and lumpectomy (left) 2015  History of knee problem  right total knee replacement

## 2019-05-17 NOTE — DISCHARGE NOTE PROVIDER - PROVIDER TOKENS
PROVIDER:[TOKEN:[[id number]]] PROVIDER:[TOKEN:[[id number]]],PROVIDER:[TOKEN:[[id number]]]

## 2019-05-17 NOTE — DISCHARGE NOTE PROVIDER - CARE PROVIDER_API CALL
Elizabeth Irizarry (MD)  Cardiovascular Disease; Interventional Cardiology  [address] [place] [address]  Phone: [phone]  Fax: [phone]  Follow Up Time: Elizabeth Irizarry)  Cardiovascular Disease; Interventional Cardiology  [address] [place] [address]  Phone: [phone]  Fax: [phone]  Follow Up Time:     Levi Larson)  Cardiology  [address] [facility], [address]  [place] [address]  Phone: [phone]  Fax: [phone]  Follow Up Time:

## 2019-05-17 NOTE — DISCHARGE NOTE PROVIDER - HOSPITAL COURSE
72 yo female PMH of CAD with stents x3 (2001 [facility]), remote MI (2001), factor V Leiden on warfarin (last dose Tuesday), DMT2 (A1c unknown, uncomplicated as per patient, relocating would need to establish) presents today for cardiac angiogram. Patient reports progressive dyspnea with mild exertion, which she attributes to being overweight and sedentary lifestyle. She is currently moving from [place] to [place] and under a lot of stress. She was hospitalized in [place] for hypertensive urgency- meds adjusted and seen by Dr. Levi Larson for discharge follow up. Since her visit she reports epigastric discomfort with associated gas and left sided Chest pain. Recommended to have stress test. During the stress test she had complaints of chest pain. Wide-complex tachycardia was noted during regadenoson infusion. Post stress imaging demonstrated normal myocardial perfusion. . Recommended to have a cardiac angiogram for further cardiac evaluation to r/o ischemia. She currently denies palpitations, dizziness, lightheadedness, syncope or near-syncope. No edema. No orthopnea.        5/17 s/p cardiac cath X1 EZEQUIEL to RCA via RRA    5/21 ( Tuesday) staged PCI for lesion to mLAD ( please arrive at 14:30, your angiogram is scheduled for 16:00 with Dr Irizarry)    HOLD Coumadin in preparation for upcoming angiogram    HOLD Metformin in preparation for upcoming angiogram 72 yo female PMH of CAD with stents x3 (2001 [facility]), remote MI (2001), factor V Leiden on warfarin (last dose Tuesday), DMT2 (A1c unknown, uncomplicated as per patient, relocating would need to establish) presents today for cardiac angiogram. Patient reports progressive dyspnea with mild exertion, which she attributes to being overweight and sedentary lifestyle. She is currently moving from [place] to [place] and under a lot of stress. She was hospitalized in [place] for hypertensive urgency- meds adjusted and seen by Dr. Levi Larson for discharge follow up. Since her visit she reports epigastric discomfort with associated gas and left sided Chest pain. Recommended to have stress test. During the stress test she had complaints of chest pain. Wide-complex tachycardia was noted during regadenoson infusion. Post stress imaging demonstrated normal myocardial perfusion. . Recommended to have a cardiac angiogram for further cardiac evaluation to r/o ischemia. She currently denies palpitations, dizziness, lightheadedness, syncope or near-syncope. No edema. No orthopnea.        5/17 s/p cardiac cath X1 EZEQUIEL to Prox RCA via right radial artery.    5/21 (Tuesday) staged PCI for lesion to mid LAD ( please arrive at 14:30, your angiogram is scheduled for 16:00 with Dr Irizarry)    HOLD Coumadin in preparation for upcoming angiogram    HOLD Metformin in preparation for upcoming angiogram

## 2019-05-17 NOTE — H&P CARDIOLOGY - FAMILY HISTORY
Father  Still living? Unknown  FH: cancer, Age at diagnosis: Age Unknown     Sibling  Still living? Unknown  FH: cancer, Age at diagnosis: Age Unknown  Family history of coronary artery bypass surgery, Age at diagnosis: Age Unknown     Mother  Still living? Unknown  FH: CAD (coronary artery disease), Age at diagnosis: Age Unknown

## 2019-05-17 NOTE — DISCHARGE NOTE PROVIDER - NSDCPNSUBOBJ_GEN_ALL_CORE
Patient is a 73y old  Female who presents with a chief complaint of chest pain, dyspnea (17 May 2019 16:50)                    Allergies        No Known Allergies        Intolerances                Medications:    amLODIPine   Tablet 5 milliGRAM(s) Oral daily    aspirin enteric coated 81 milliGRAM(s) Oral daily    carvedilol 12.5 milliGRAM(s) Oral every 12 hours    clopidogrel Tablet 75 milliGRAM(s) Oral daily    dextrose 40% Gel 15 Gram(s) Oral once PRN    dextrose 5%. 1000 milliLiter(s) IV Continuous <Continuous>    dextrose 50% Injectable 12.5 Gram(s) IV Push once    dextrose 50% Injectable 25 Gram(s) IV Push once    dextrose 50% Injectable 25 Gram(s) IV Push once    furosemide    Tablet 40 milliGRAM(s) Oral daily    glucagon  Injectable 1 milliGRAM(s) IntraMuscular once PRN    insulin lispro (HumaLOG) corrective regimen sliding scale   SubCutaneous three times a day before meals    insulin lispro (HumaLOG) corrective regimen sliding scale   SubCutaneous at bedtime    lisinopril 5 milliGRAM(s) Oral daily    pantoprazole    Tablet 40 milliGRAM(s) Oral before breakfast    rosuvastatin 10 milliGRAM(s) Oral at bedtime    sodium chloride 0.9% lock flush 3 milliLiter(s) IV Push every 8 hours            Vitals:    T(C): 36.8 (19 @ 20:40), Max: 36.8 (19 @ 20:40)    HR: 63 (19 @ 23:09) (63 - 74)    BP: 147/54 (19 @ 20:40) (138/44 - 182/59)    BP(mean): --    RR: 16 (19 @ 20:40) (14 - 16)    SpO2: 96% (19 @ 23:09) (95% - 98%)    Wt(kg): --    Daily Height in cm: 154.94 (17 May 2019 15:40)      Daily Weight in k.9 (17 May 2019 15:40)    I&O's Summary        17 May 2019 07:01  -  18 May 2019 01:57    --------------------------------------------------------    IN: 260 mL / OUT: 0 mL / NET: 260 mL                    Physical Exam:    Appearance: Normal    Eyes: PERRL, EOMI    HENT: Normal oral muscosa, NC/AT    Cardiovascular: S1S2, RRR, No M/R/G, no JVD, No Lower extremity edema    Procedural Access Site: No hematoma, Non-tender to palpation, 2+ pulse, No bruit, No Ecchymosis    Respiratory: Clear to auscultation bilaterally    Gastrointestinal: Soft, Non tender, Normal Bowel Sounds    Musculoskeletal: No clubbing, No joint deformity     Neurologic: Non-focal    Lymphatic: No lymphadenopathy    Psychiatry: AAOx3, Mood & affect appropriate    Skin: No rashes, No ecchymoses, No cyanosis                138  |  100  |  27<H>    ----------------------------<  106<H>    4.1   |  24  |  0.98        Ca    9.2      18 May 2019 00:28        TPro  8.3  /  Alb  4.6  /  TBili  0.6  /  DBili  x   /  AST  30  /  ALT  40  /  AlkPhos  48          PT/INR - ( 17 May 2019 13:37 )   PT: 15.3 sec;   INR: 1.33 ratio               PTT - ( 17 May 2019 13:37 )  PTT:30.7 sec                Lipid panel     Hgb A1c Hemoglobin A1C, Whole Blood: 7.6 % ( @ 19:30)                                11.4     6.7   )-----------( 219      ( 18 May 2019 00:28 )               34.0                 ECG: SR 1st degree 64 bpm        Cath: one Prox RCA stent        Imaging:        Interpretation of Telemetry:

## 2019-05-17 NOTE — H&P CARDIOLOGY - HISTORY OF PRESENT ILLNESS
74 yo female PMH of CAD, remote MI, factor V Leiden on warfarin (last dose Tuesday), DMT2 (A1c unknown, uncomplicated as per patient) presents today for cardiac angiogram. Patient reports dyspnea with exertion, which she attributes to being overweight and sedentary lifestyle. She is currently moving from [place] to [place] and under a lot of stress. She was hospitalized in [place] for hypertensive urgency- meds adjusted and seen by Dr. Levi Larson for discharge follow up. Since her visit she reports epigastric discomfort with associated gas and left sided Chest pain. Recommedned to have stress test. During the stress test she had complaints of chest pain. Wide-complex tachycardia was noted during regadenoson infusion. Post stress imaging demonstrated normal myocardial perfusion. . Recommended to have a cardiac angiogram for further cardiac evaluation to r/o ischemia. She currently denies palpitations, dizziness, lightheadedness, syncope or near-syncope. No edema. No orthopnea. 72 yo female PMH of CAD with stents x3 (2001 [facility]), remote MI (2001), factor V Leiden on warfarin (last dose Tuesday), DMT2 (A1c unknown, uncomplicated as per patient, relocating would need to establish) presents today for cardiac angiogram. Patient reports progressive dyspnea with mild exertion, which she attributes to being overweight and sedentary lifestyle. She is currently moving from [place] to [place] and under a lot of stress. She was hospitalized in [place] for hypertensive urgency- meds adjusted and seen by Dr. Levi Larson for discharge follow up. Since her visit she reports epigastric discomfort with associated gas and left sided Chest pain. Recommended to have stress test. During the stress test she had complaints of chest pain. Wide-complex tachycardia was noted during regadenoson infusion. Post stress imaging demonstrated normal myocardial perfusion. . Recommended to have a cardiac angiogram for further cardiac evaluation to r/o ischemia. She currently denies palpitations, dizziness, lightheadedness, syncope or near-syncope. No edema. No orthopnea.

## 2019-05-17 NOTE — DISCHARGE NOTE PROVIDER - NSDCACTIVITY_GEN_ALL_CORE
No heavy lifting/straining Walking - Indoors allowed/No heavy lifting/straining/Walking - Outdoors allowed/Showering allowed

## 2019-05-17 NOTE — DISCHARGE NOTE PROVIDER - NSDCQMERRANDS_GEN_ALL_CORE
"Chief Complaint   Patient presents with     Recheck Medication     RA       Initial /71  Pulse 65  Temp 97.9  F (36.6  C)  Resp 14  Wt 155 lb (70.3 kg)  BMI 23.57 kg/m2 Estimated body mass index is 23.57 kg/(m^2) as calculated from the following:    Height as of 4/12/17: 5' 8\" (1.727 m).    Weight as of this encounter: 155 lb (70.3 kg).  Medication Reconciliation: complete   Maine Lerner LPN    " No

## 2019-05-18 ENCOUNTER — TRANSCRIPTION ENCOUNTER (OUTPATIENT)
Age: 74
End: 2019-05-18

## 2019-05-18 VITALS
OXYGEN SATURATION: 95 % | HEART RATE: 71 BPM | SYSTOLIC BLOOD PRESSURE: 146 MMHG | RESPIRATION RATE: 16 BRPM | DIASTOLIC BLOOD PRESSURE: 51 MMHG | TEMPERATURE: 98 F

## 2019-05-18 LAB
ANION GAP SERPL CALC-SCNC: 14 MMOL/L — SIGNIFICANT CHANGE UP (ref 5–17)
BUN SERPL-MCNC: 27 MG/DL — HIGH (ref 7–23)
CALCIUM SERPL-MCNC: 9.2 MG/DL — SIGNIFICANT CHANGE UP (ref 8.4–10.5)
CHLORIDE SERPL-SCNC: 100 MMOL/L — SIGNIFICANT CHANGE UP (ref 96–108)
CO2 SERPL-SCNC: 24 MMOL/L — SIGNIFICANT CHANGE UP (ref 22–31)
CREAT SERPL-MCNC: 0.98 MG/DL — SIGNIFICANT CHANGE UP (ref 0.5–1.3)
GLUCOSE BLDC GLUCOMTR-MCNC: 168 MG/DL — HIGH (ref 70–99)
GLUCOSE SERPL-MCNC: 106 MG/DL — HIGH (ref 70–99)
HCT VFR BLD CALC: 34 % — LOW (ref 34.5–45)
HGB BLD-MCNC: 11.4 G/DL — LOW (ref 11.5–15.5)
MCHC RBC-ENTMCNC: 29.3 PG — SIGNIFICANT CHANGE UP (ref 27–34)
MCHC RBC-ENTMCNC: 33.5 GM/DL — SIGNIFICANT CHANGE UP (ref 32–36)
MCV RBC AUTO: 87.4 FL — SIGNIFICANT CHANGE UP (ref 80–100)
PLATELET # BLD AUTO: 219 K/UL — SIGNIFICANT CHANGE UP (ref 150–400)
POTASSIUM SERPL-MCNC: 4.1 MMOL/L — SIGNIFICANT CHANGE UP (ref 3.5–5.3)
POTASSIUM SERPL-SCNC: 4.1 MMOL/L — SIGNIFICANT CHANGE UP (ref 3.5–5.3)
RBC # BLD: 3.89 M/UL — SIGNIFICANT CHANGE UP (ref 3.8–5.2)
RBC # FLD: 12.1 % — SIGNIFICANT CHANGE UP (ref 10.3–14.5)
SODIUM SERPL-SCNC: 138 MMOL/L — SIGNIFICANT CHANGE UP (ref 135–145)
WBC # BLD: 6.7 K/UL — SIGNIFICANT CHANGE UP (ref 3.8–10.5)
WBC # FLD AUTO: 6.7 K/UL — SIGNIFICANT CHANGE UP (ref 3.8–10.5)

## 2019-05-18 PROCEDURE — 82962 GLUCOSE BLOOD TEST: CPT

## 2019-05-18 PROCEDURE — 93005 ELECTROCARDIOGRAM TRACING: CPT

## 2019-05-18 PROCEDURE — 93458 L HRT ARTERY/VENTRICLE ANGIO: CPT | Mod: XU

## 2019-05-18 PROCEDURE — C1887: CPT

## 2019-05-18 PROCEDURE — C1894: CPT

## 2019-05-18 PROCEDURE — 85610 PROTHROMBIN TIME: CPT

## 2019-05-18 PROCEDURE — 99153 MOD SED SAME PHYS/QHP EA: CPT

## 2019-05-18 PROCEDURE — 99231 SBSQ HOSP IP/OBS SF/LOW 25: CPT

## 2019-05-18 PROCEDURE — 80053 COMPREHEN METABOLIC PANEL: CPT

## 2019-05-18 PROCEDURE — 85730 THROMBOPLASTIN TIME PARTIAL: CPT

## 2019-05-18 PROCEDURE — 83036 HEMOGLOBIN GLYCOSYLATED A1C: CPT

## 2019-05-18 PROCEDURE — C9600: CPT | Mod: RC

## 2019-05-18 PROCEDURE — C1725: CPT

## 2019-05-18 PROCEDURE — C1874: CPT

## 2019-05-18 PROCEDURE — C1769: CPT

## 2019-05-18 PROCEDURE — 85027 COMPLETE CBC AUTOMATED: CPT

## 2019-05-18 PROCEDURE — 80048 BASIC METABOLIC PNL TOTAL CA: CPT

## 2019-05-18 PROCEDURE — 99152 MOD SED SAME PHYS/QHP 5/>YRS: CPT

## 2019-05-18 RX ORDER — SIMETHICONE 80 MG/1
80 TABLET, CHEWABLE ORAL EVERY 6 HOURS
Refills: 0 | Status: DISCONTINUED | OUTPATIENT
Start: 2019-05-18 | End: 2019-05-18

## 2019-05-18 RX ADMIN — PANTOPRAZOLE SODIUM 40 MILLIGRAM(S): 20 TABLET, DELAYED RELEASE ORAL at 05:10

## 2019-05-18 RX ADMIN — SIMETHICONE 80 MILLIGRAM(S): 80 TABLET, CHEWABLE ORAL at 06:04

## 2019-05-18 RX ADMIN — Medication 81 MILLIGRAM(S): at 05:10

## 2019-05-18 RX ADMIN — Medication 40 MILLIGRAM(S): at 05:10

## 2019-05-18 RX ADMIN — CLOPIDOGREL BISULFATE 75 MILLIGRAM(S): 75 TABLET, FILM COATED ORAL at 05:10

## 2019-05-18 RX ADMIN — SODIUM CHLORIDE 3 MILLILITER(S): 9 INJECTION INTRAMUSCULAR; INTRAVENOUS; SUBCUTANEOUS at 05:04

## 2019-05-18 RX ADMIN — LISINOPRIL 5 MILLIGRAM(S): 2.5 TABLET ORAL at 05:10

## 2019-05-18 RX ADMIN — AMLODIPINE BESYLATE 5 MILLIGRAM(S): 2.5 TABLET ORAL at 05:10

## 2019-05-18 RX ADMIN — Medication 1: at 07:34

## 2019-05-18 NOTE — PROVIDER CONTACT NOTE (OTHER) - ASSESSMENT
pt A+Ox3, resting comfortably. denies cp/sob. NSR on tele monitor. right radial site benign, no sign of bleeding or hematoma noted.

## 2019-05-18 NOTE — DISCHARGE NOTE NURSING/CASE MANAGEMENT/SOCIAL WORK - NSDCDPATPORTLINK_GEN_ALL_CORE
You can access the [facility] Patient Portal, offered by [facility], by registering with the following website: [email or web address] [facility]

## 2019-05-20 PROBLEM — K46.9 UNSPECIFIED ABDOMINAL HERNIA WITHOUT OBSTRUCTION OR GANGRENE: Chronic | Status: ACTIVE | Noted: 2019-05-17

## 2019-05-20 PROBLEM — E66.9 OBESITY, UNSPECIFIED: Chronic | Status: ACTIVE | Noted: 2019-05-17

## 2019-05-20 PROBLEM — I25.2 OLD MYOCARDIAL INFARCTION: Chronic | Status: ACTIVE | Noted: 2019-05-17

## 2019-05-20 PROBLEM — A69.20 LYME DISEASE, UNSPECIFIED: Chronic | Status: ACTIVE | Noted: 2019-05-17

## 2019-05-20 PROBLEM — D68.51 ACTIVATED PROTEIN C RESISTANCE: Chronic | Status: ACTIVE | Noted: 2019-05-17

## 2019-05-20 PROBLEM — E11.9 TYPE 2 DIABETES MELLITUS WITHOUT COMPLICATIONS: Chronic | Status: ACTIVE | Noted: 2019-05-17

## 2019-05-20 PROBLEM — I10 ESSENTIAL (PRIMARY) HYPERTENSION: Chronic | Status: ACTIVE | Noted: 2019-05-17

## 2019-05-20 PROBLEM — J44.9 CHRONIC OBSTRUCTIVE PULMONARY DISEASE, UNSPECIFIED: Chronic | Status: ACTIVE | Noted: 2019-05-17

## 2019-05-20 PROBLEM — I25.10 ATHEROSCLEROTIC HEART DISEASE OF NATIVE CORONARY ARTERY WITHOUT ANGINA PECTORIS: Chronic | Status: ACTIVE | Noted: 2019-05-17

## 2019-05-20 PROBLEM — I82.409 ACUTE EMBOLISM AND THROMBOSIS OF UNSPECIFIED DEEP VEINS OF UNSPECIFIED LOWER EXTREMITY: Chronic | Status: ACTIVE | Noted: 2019-05-17

## 2019-05-20 PROBLEM — C50.919 MALIGNANT NEOPLASM OF UNSPECIFIED SITE OF UNSPECIFIED FEMALE BREAST: Chronic | Status: ACTIVE | Noted: 2019-05-17

## 2019-05-21 ENCOUNTER — INPATIENT (INPATIENT)
Facility: HOSPITAL | Age: 74
LOS: 0 days | Discharge: HOME | DRG: 247 | End: 2019-05-22
Attending: INTERNAL MEDICINE | Admitting: INTERNAL MEDICINE
Payer: MEDICARE

## 2019-05-21 VITALS
HEIGHT: 62 IN | HEART RATE: 67 BPM | SYSTOLIC BLOOD PRESSURE: 168 MMHG | RESPIRATION RATE: 18 BRPM | DIASTOLIC BLOOD PRESSURE: 71 MMHG | WEIGHT: 237 LBS | OXYGEN SATURATION: 98 % | TEMPERATURE: 98 F

## 2019-05-21 DIAGNOSIS — Z90.49 ACQUIRED ABSENCE OF OTHER SPECIFIED PARTS OF DIGESTIVE TRACT: Chronic | ICD-10-CM

## 2019-05-21 DIAGNOSIS — Z87.39 PERSONAL HISTORY OF OTHER DISEASES OF THE MUSCULOSKELETAL SYSTEM AND CONNECTIVE TISSUE: Chronic | ICD-10-CM

## 2019-05-21 DIAGNOSIS — T14.90XA INJURY, UNSPECIFIED, INITIAL ENCOUNTER: Chronic | ICD-10-CM

## 2019-05-21 DIAGNOSIS — Z90.710 ACQUIRED ABSENCE OF BOTH CERVIX AND UTERUS: Chronic | ICD-10-CM

## 2019-05-21 DIAGNOSIS — I25.10 ATHEROSCLEROTIC HEART DISEASE OF NATIVE CORONARY ARTERY WITHOUT ANGINA PECTORIS: ICD-10-CM

## 2019-05-21 DIAGNOSIS — Z90.89 ACQUIRED ABSENCE OF OTHER ORGANS: Chronic | ICD-10-CM

## 2019-05-21 DIAGNOSIS — Z98.890 OTHER SPECIFIED POSTPROCEDURAL STATES: Chronic | ICD-10-CM

## 2019-05-21 LAB
ALBUMIN SERPL ELPH-MCNC: 4.8 G/DL — SIGNIFICANT CHANGE UP (ref 3.3–5)
ALP SERPL-CCNC: 54 U/L — SIGNIFICANT CHANGE UP (ref 40–120)
ALT FLD-CCNC: 42 U/L — SIGNIFICANT CHANGE UP (ref 10–45)
ANION GAP SERPL CALC-SCNC: 14 MMOL/L — SIGNIFICANT CHANGE UP (ref 5–17)
APTT BLD: 28 SEC — SIGNIFICANT CHANGE UP (ref 27.5–36.3)
AST SERPL-CCNC: 34 U/L — SIGNIFICANT CHANGE UP (ref 10–40)
BILIRUB SERPL-MCNC: 0.5 MG/DL — SIGNIFICANT CHANGE UP (ref 0.2–1.2)
BUN SERPL-MCNC: 26 MG/DL — HIGH (ref 7–23)
CALCIUM SERPL-MCNC: 10.3 MG/DL — SIGNIFICANT CHANGE UP (ref 8.4–10.5)
CHLORIDE SERPL-SCNC: 98 MMOL/L — SIGNIFICANT CHANGE UP (ref 96–108)
CO2 SERPL-SCNC: 25 MMOL/L — SIGNIFICANT CHANGE UP (ref 22–31)
CREAT SERPL-MCNC: 0.94 MG/DL — SIGNIFICANT CHANGE UP (ref 0.5–1.3)
GLUCOSE SERPL-MCNC: 114 MG/DL — HIGH (ref 70–99)
HCT VFR BLD CALC: 37.8 % — SIGNIFICANT CHANGE UP (ref 34.5–45)
HGB BLD-MCNC: 12.9 G/DL — SIGNIFICANT CHANGE UP (ref 11.5–15.5)
INR BLD: 1.08 RATIO — SIGNIFICANT CHANGE UP (ref 0.88–1.16)
MCHC RBC-ENTMCNC: 30 PG — SIGNIFICANT CHANGE UP (ref 27–34)
MCHC RBC-ENTMCNC: 34.1 GM/DL — SIGNIFICANT CHANGE UP (ref 32–36)
MCV RBC AUTO: 87.9 FL — SIGNIFICANT CHANGE UP (ref 80–100)
PLATELET # BLD AUTO: 243 K/UL — SIGNIFICANT CHANGE UP (ref 150–400)
POTASSIUM SERPL-MCNC: 4.5 MMOL/L — SIGNIFICANT CHANGE UP (ref 3.5–5.3)
POTASSIUM SERPL-SCNC: 4.5 MMOL/L — SIGNIFICANT CHANGE UP (ref 3.5–5.3)
PROT SERPL-MCNC: 8.8 G/DL — HIGH (ref 6–8.3)
PROTHROM AB SERPL-ACNC: 12.4 SEC — SIGNIFICANT CHANGE UP (ref 10–12.9)
RBC # BLD: 4.3 M/UL — SIGNIFICANT CHANGE UP (ref 3.8–5.2)
RBC # FLD: 12.6 % — SIGNIFICANT CHANGE UP (ref 10.3–14.5)
SODIUM SERPL-SCNC: 137 MMOL/L — SIGNIFICANT CHANGE UP (ref 135–145)
WBC # BLD: 7.7 K/UL — SIGNIFICANT CHANGE UP (ref 3.8–10.5)
WBC # FLD AUTO: 7.7 K/UL — SIGNIFICANT CHANGE UP (ref 3.8–10.5)

## 2019-05-21 PROCEDURE — 93010 ELECTROCARDIOGRAM REPORT: CPT

## 2019-05-21 PROCEDURE — 99152 MOD SED SAME PHYS/QHP 5/>YRS: CPT | Mod: GC

## 2019-05-21 PROCEDURE — 92928 PRQ TCAT PLMT NTRAC ST 1 LES: CPT | Mod: LD,GC

## 2019-05-21 RX ORDER — AMLODIPINE BESYLATE 2.5 MG/1
5 TABLET ORAL DAILY
Refills: 0 | Status: DISCONTINUED | OUTPATIENT
Start: 2019-05-21 | End: 2019-05-22

## 2019-05-21 RX ORDER — CARVEDILOL PHOSPHATE 80 MG/1
12.5 CAPSULE, EXTENDED RELEASE ORAL EVERY 12 HOURS
Refills: 0 | Status: DISCONTINUED | OUTPATIENT
Start: 2019-05-21 | End: 2019-05-22

## 2019-05-21 RX ORDER — SODIUM CHLORIDE 9 MG/ML
1000 INJECTION, SOLUTION INTRAVENOUS
Refills: 0 | Status: DISCONTINUED | OUTPATIENT
Start: 2019-05-21 | End: 2019-05-22

## 2019-05-21 RX ORDER — LISINOPRIL 2.5 MG/1
5 TABLET ORAL DAILY
Refills: 0 | Status: DISCONTINUED | OUTPATIENT
Start: 2019-05-21 | End: 2019-05-22

## 2019-05-21 RX ORDER — ASPIRIN/CALCIUM CARB/MAGNESIUM 324 MG
81 TABLET ORAL DAILY
Refills: 0 | Status: DISCONTINUED | OUTPATIENT
Start: 2019-05-21 | End: 2019-05-22

## 2019-05-21 RX ORDER — PANTOPRAZOLE SODIUM 20 MG/1
40 TABLET, DELAYED RELEASE ORAL
Refills: 0 | Status: DISCONTINUED | OUTPATIENT
Start: 2019-05-21 | End: 2019-05-22

## 2019-05-21 RX ORDER — CLOPIDOGREL BISULFATE 75 MG/1
75 TABLET, FILM COATED ORAL DAILY
Refills: 0 | Status: DISCONTINUED | OUTPATIENT
Start: 2019-05-21 | End: 2019-05-22

## 2019-05-21 RX ORDER — POTASSIUM CHLORIDE 20 MEQ
1 PACKET (EA) ORAL
Qty: 0 | Refills: 0 | DISCHARGE

## 2019-05-21 RX ORDER — GLUCAGON INJECTION, SOLUTION 0.5 MG/.1ML
1 INJECTION, SOLUTION SUBCUTANEOUS ONCE
Refills: 0 | Status: DISCONTINUED | OUTPATIENT
Start: 2019-05-21 | End: 2019-05-22

## 2019-05-21 RX ORDER — INSULIN LISPRO 100/ML
VIAL (ML) SUBCUTANEOUS
Refills: 0 | Status: DISCONTINUED | OUTPATIENT
Start: 2019-05-21 | End: 2019-05-22

## 2019-05-21 RX ORDER — DEXTROSE 50 % IN WATER 50 %
25 SYRINGE (ML) INTRAVENOUS ONCE
Refills: 0 | Status: DISCONTINUED | OUTPATIENT
Start: 2019-05-21 | End: 2019-05-22

## 2019-05-21 RX ORDER — FUROSEMIDE 40 MG
40 TABLET ORAL DAILY
Refills: 0 | Status: DISCONTINUED | OUTPATIENT
Start: 2019-05-21 | End: 2019-05-22

## 2019-05-21 RX ORDER — DEXTROSE 50 % IN WATER 50 %
12.5 SYRINGE (ML) INTRAVENOUS ONCE
Refills: 0 | Status: DISCONTINUED | OUTPATIENT
Start: 2019-05-21 | End: 2019-05-22

## 2019-05-21 RX ORDER — ATORVASTATIN CALCIUM 80 MG/1
40 TABLET, FILM COATED ORAL AT BEDTIME
Refills: 0 | Status: DISCONTINUED | OUTPATIENT
Start: 2019-05-21 | End: 2019-05-22

## 2019-05-21 RX ORDER — DEXTROSE 50 % IN WATER 50 %
15 SYRINGE (ML) INTRAVENOUS ONCE
Refills: 0 | Status: DISCONTINUED | OUTPATIENT
Start: 2019-05-21 | End: 2019-05-22

## 2019-05-21 RX ORDER — INSULIN LISPRO 100/ML
VIAL (ML) SUBCUTANEOUS AT BEDTIME
Refills: 0 | Status: DISCONTINUED | OUTPATIENT
Start: 2019-05-21 | End: 2019-05-22

## 2019-05-21 RX ORDER — WARFARIN SODIUM 2.5 MG/1
7.5 TABLET ORAL ONCE
Refills: 0 | Status: COMPLETED | OUTPATIENT
Start: 2019-05-21 | End: 2019-05-21

## 2019-05-21 RX ORDER — OMEPRAZOLE 10 MG/1
1 CAPSULE, DELAYED RELEASE ORAL
Qty: 0 | Refills: 0 | DISCHARGE

## 2019-05-21 RX ADMIN — LISINOPRIL 5 MILLIGRAM(S): 2.5 TABLET ORAL at 22:11

## 2019-05-21 RX ADMIN — CARVEDILOL PHOSPHATE 12.5 MILLIGRAM(S): 80 CAPSULE, EXTENDED RELEASE ORAL at 22:11

## 2019-05-21 RX ADMIN — WARFARIN SODIUM 7.5 MILLIGRAM(S): 2.5 TABLET ORAL at 22:11

## 2019-05-21 RX ADMIN — PANTOPRAZOLE SODIUM 40 MILLIGRAM(S): 20 TABLET, DELAYED RELEASE ORAL at 22:11

## 2019-05-21 RX ADMIN — ATORVASTATIN CALCIUM 40 MILLIGRAM(S): 80 TABLET, FILM COATED ORAL at 22:11

## 2019-05-21 RX ADMIN — AMLODIPINE BESYLATE 5 MILLIGRAM(S): 2.5 TABLET ORAL at 20:45

## 2019-05-21 NOTE — H&P CARDIOLOGY - URINARY CATHETER
Cardiology Progress Note       ICU  NAME:  Staci Palencia   :   1942   MRN:   [id number]     Assessment/Plan:   1. STEMI: s/p TRACEE to  prox RCA, ostial LAD. DAPT: asa, plavix. Cont BB, statin,  ECHO with nml EF. Reviewed testing/results/meds/follow up importance with pt. H2H post MI visit. 2. HTN  3. DM: A1C 8.9, DCT following  4. HLD: on statin   5. PAF: SR here      Follow-up Information     Follow up With Details Comments Ernesto Irwin MD On 2018 1:40 pm  [id number] [address]      [address]  MI visit Adriana[address] [place] [address]  [facility] [id number] [phone]        He has appt in  with [facility]ologist [facility]     Subjective:   Staci Palencia is a 77 y.o. male admitted for STEMI (ST elevation myocardial infarction) ([facility] [address]  Subjective:  Pt with known cad, stent 1 vessel approx  details unknown. Has been feeling poorly last couple days with indigestion, nausea and cp. About 2 hrs pta cp worsened and came to er. ekg showed acute imi.       delay in door to balloon time because of difficult vascular access. Left main ok. 50% prox lad, lcx scattered irregs. Totally occluded prox rca, 60% ostial rca, with moderate diffuse distal disease and left to right collaterals. prox rca treated with 3.5 by 23mm tracee to 10 jovan, ostial lad treated with 3.5 by 18mm tracee to 12 jovan, post dilated with 3.5 by 12mm nc balloon to 14 jovan. Cardiac ROS: Patient denies any exertional chest pain, dyspnea, palpitations, syncope, orthopnea, edema or paroxysmal nocturnal dyspnea. Review of Systems: No nausea, indigestion, vomiting, pain, cough, sputum. No bleeding. Taking po. Appetite ok. Ambulatory in room.            Objective:     Visit Vitals    /67    Pulse 72    Temp 97.8 °F (36.6 °C)    Resp 18    Ht 5' 9\" (1.753 m)    Wt 197 lb 1.5 oz (89.4 kg)    SpO2 96%  BMI 29.11 kg/m2    O2 Flow Rate (L/min): 2 l/min O2 Device: Room air    Temp (24hrs), Av.5 °F (36.9 °C), Min:97.8 °F (36.6 °C), Max:98.8 °F (37.1 °C)           1901 -  0700  In: 31.9 [I.V.:31.9]  Out: 800 [Urine:800]     TELE: SR no VT/NSVT overnight    General: AAOx3 un cooperative, no acute distress. HEENT: Atraumatic. Pink and moist.   Neck : Supple, no thyromegaly. Lungs: CTA bilaterally. No wheezing/rhonchi/rales. Heart: Regular rhythm, no murmur. No JVD. No carotid bruits. Abdomen: Soft, non-distended, non-tender. + Bowel sounds. R groin: soft, no hematoma, small amt ecchymosis extends beyond the 2X2. Extremities: No edema, no clubbing, no cyanosis. Neurologic: Grossly intact. Alert and oriented X 3. No acute neurological distress. Psych: Good insight. Not agitated.          Care Plan discussed with:    Comments   Patient x    Family      RN x    Care Manager x                   Consultant:  x intensivist       Data Review:     No lab exists for component: ITNL   Recent Labs      18   0921  18   0017  18   1724   TROIQ  25.33*  31.89*  0.46*     Recent Labs      18   0446  18   0433  18   0017  18   1724   NA  139  138   --   138   K  4.2  3.9   --   3.8   CL  104  103   --   100   CO2  27  24   --      BUN  20  14   --   16   CREA  1.28  1.20   --   1.50*   GLU  149*  173*   --   325*   MG  2.1   --    --    --    ALB   --    --    --   4.0   WBC   --    --   9.2  10.4   HGB   --    --   16.3  17.2*   HCT   --    --   47.7  50.1   PLT   --    --   211  266     Recent Labs      18   1724   INR  0.9   PTP  9.4       Medications reviewed  Current Facility-Administered Medications   Medication Dose Route Frequency    nicotine (NICODERM CQ) 21 mg/24 hr patch 1 Patch  1 Patch TransDERmal Q24H    glucose chewable tablet 16 g  4 Tab Oral PRN    dextrose (D50W) injection syrg 12.5-25 g  12.5-25 g IntraVENous PRN    glucagon (GLUCAGEN) injection 1 mg  1 mg IntraMUSCular PRN    insulin lispro (HUMALOG) injection   SubCUTAneous AC&HS    pravastatin (PRAVACHOL) tablet 80 mg  80 mg Oral QHS    atenolol (TENORMIN) tablet 100 mg  100 mg Oral DAILY    aspirin delayed-release tablet 81 mg  81 mg Oral DAILY    sodium chloride (NS) flush 5-10 mL  5-10 mL IntraVENous Q8H    sodium chloride (NS) flush 5-10 mL  5-10 mL IntraVENous PRN    sodium chloride (NS) flush 5-10 mL  5-10 mL IntraVENous Q8H    sodium chloride (NS) flush 5-10 mL  5-10 mL IntraVENous PRN    clopidogrel (PLAVIX) tablet 75 mg  75 mg Oral DAILY         Baljit Summers NP no

## 2019-05-21 NOTE — H&P CARDIOLOGY - HISTORY OF PRESENT ILLNESS
72 yo  female PMH of CAD with stents x3 (2001 [facility]), remote MI (2001), factor V Leiden on warfarin (last dose Tuesday 5/14/19), DMT2 (A1c -7.6 5/17/19, uncomplicated as per patient, relocating would need to establish), CAD/PCI of RCA on 5/17/19, presents for Staged PCI of LAD.     < from: Cardiac Cath Lab - Adult (05.17.19 @ 14:38) >  CORONARY VESSELS: The coronary circulation is right dominant.  LM:   --  LM: Normal.  LAD:   --  Proximal LAD: There was a tubular 80 % stenosis at the site of a prior stent, in-stent.  --  Distal LAD: There was a 50 % stenosis.  --  D1: Angiography showed minor luminal irregularities with no flow limiting lesions.  CX: --  Proximal circumflex: There was a 30 % stenosis.--  OM1: There was a 30 % stenosis.  RCA:   --  Proximal RCA: There was a tubular 90 % stenosis at the site of a prior stent, in-stent.--  Distal RCA: There was a tubular 50 % stenosis.  COMPLICATIONS: There were no complications.  DIAGNOSTIC IMPRESSIONS: Severe RCA and LAD restenosis.  INTERVENTIONAL RECOMMENDATIONS: Aspirin and Plavix. Staged PCI to the LAD. 74 yo  female PMH of CAD with stents x3 (2001 [facility]), remote MI (2001), factor V Leiden on warfarin (last dose Tuesday 5/14/19), DMT2 (A1c -7.6 5/17/19, uncomplicated as per patient, relocating would need to establish), JAIMIE-uses own CPAP,  CAD/PCI of RCA on 5/17/19, presents for Staged PCI of LAD.     < from: Cardiac Cath Lab - Adult (05.17.19 @ 14:38) >  CORONARY VESSELS: The coronary circulation is right dominant.  LM:   --  LM: Normal.  LAD:   --  Proximal LAD: There was a tubular 80 % stenosis at the site of a prior stent, in-stent.  --  Distal LAD: There was a 50 % stenosis.  --  D1: Angiography showed minor luminal irregularities with no flow limiting lesions.  CX: --  Proximal circumflex: There was a 30 % stenosis.--  OM1: There was a 30 % stenosis.  RCA:   --  Proximal RCA: There was a tubular 90 % stenosis at the site of a prior stent, in-stent.--  Distal RCA: There was a tubular 50 % stenosis.  COMPLICATIONS: There were no complications.  DIAGNOSTIC IMPRESSIONS: Severe RCA and LAD restenosis.  INTERVENTIONAL RECOMMENDATIONS: Aspirin and Plavix. Staged PCI to the LAD.

## 2019-05-21 NOTE — H&P CARDIOLOGY - PMH
CAD (coronary artery disease)    Diabetes    DVT (deep venous thrombosis)  LLE  Factor V Leiden    History of coronary artery stent placement  x3 [facility]  HTN (hypertension)    Lyme disease    Malignant neoplasm of breast (female)  lymph nodes removed and lumpectomy (left) 2015  Myocardial infarct, old  2001  Obesity CAD (coronary artery disease)    COPD (chronic obstructive pulmonary disease)  second hand smoke  Diabetes    DVT (deep venous thrombosis)  LLE  Factor V Leiden    Hernia, abdominal    History of coronary artery stent placement  x3 [facility]  HTN (hypertension)    Lyme disease    Malignant neoplasm of breast (female)  lymph nodes removed and lumpectomy (left) 2015  treat with radiation  Myocardial infarct, old  2001  Obesity    JAIMIE on CPAP

## 2019-05-22 ENCOUNTER — TRANSCRIPTION ENCOUNTER (OUTPATIENT)
Age: 74
End: 2019-05-22

## 2019-05-22 VITALS
HEART RATE: 75 BPM | OXYGEN SATURATION: 96 % | TEMPERATURE: 98 F | SYSTOLIC BLOOD PRESSURE: 140 MMHG | RESPIRATION RATE: 18 BRPM | DIASTOLIC BLOOD PRESSURE: 76 MMHG

## 2019-05-22 LAB
ANION GAP SERPL CALC-SCNC: 13 MMOL/L — SIGNIFICANT CHANGE UP (ref 5–17)
BASOPHILS # BLD AUTO: 0 K/UL — SIGNIFICANT CHANGE UP (ref 0–0.2)
BASOPHILS NFR BLD AUTO: 0.8 % — SIGNIFICANT CHANGE UP (ref 0–2)
BUN SERPL-MCNC: 23 MG/DL — SIGNIFICANT CHANGE UP (ref 7–23)
CALCIUM SERPL-MCNC: 9.7 MG/DL — SIGNIFICANT CHANGE UP (ref 8.4–10.5)
CHLORIDE SERPL-SCNC: 99 MMOL/L — SIGNIFICANT CHANGE UP (ref 96–108)
CO2 SERPL-SCNC: 27 MMOL/L — SIGNIFICANT CHANGE UP (ref 22–31)
CREAT SERPL-MCNC: 0.89 MG/DL — SIGNIFICANT CHANGE UP (ref 0.5–1.3)
EOSINOPHIL # BLD AUTO: 0.3 K/UL — SIGNIFICANT CHANGE UP (ref 0–0.5)
EOSINOPHIL NFR BLD AUTO: 5.2 % — SIGNIFICANT CHANGE UP (ref 0–6)
GLUCOSE BLDC GLUCOMTR-MCNC: 112 MG/DL — HIGH (ref 70–99)
GLUCOSE BLDC GLUCOMTR-MCNC: 112 MG/DL — HIGH (ref 70–99)
GLUCOSE BLDC GLUCOMTR-MCNC: 163 MG/DL — HIGH (ref 70–99)
GLUCOSE BLDC GLUCOMTR-MCNC: 166 MG/DL — HIGH (ref 70–99)
GLUCOSE SERPL-MCNC: 162 MG/DL — HIGH (ref 70–99)
HCT VFR BLD CALC: 38.7 % — SIGNIFICANT CHANGE UP (ref 34.5–45)
HGB BLD-MCNC: 12.7 G/DL — SIGNIFICANT CHANGE UP (ref 11.5–15.5)
LYMPHOCYTES # BLD AUTO: 1.7 K/UL — SIGNIFICANT CHANGE UP (ref 1–3.3)
LYMPHOCYTES # BLD AUTO: 26.8 % — SIGNIFICANT CHANGE UP (ref 13–44)
MCHC RBC-ENTMCNC: 28.9 PG — SIGNIFICANT CHANGE UP (ref 27–34)
MCHC RBC-ENTMCNC: 32.8 GM/DL — SIGNIFICANT CHANGE UP (ref 32–36)
MCV RBC AUTO: 88.2 FL — SIGNIFICANT CHANGE UP (ref 80–100)
MONOCYTES # BLD AUTO: 0.9 K/UL — SIGNIFICANT CHANGE UP (ref 0–0.9)
MONOCYTES NFR BLD AUTO: 13.3 % — SIGNIFICANT CHANGE UP (ref 2–14)
NEUTROPHILS # BLD AUTO: 3.5 K/UL — SIGNIFICANT CHANGE UP (ref 1.8–7.4)
NEUTROPHILS NFR BLD AUTO: 54 % — SIGNIFICANT CHANGE UP (ref 43–77)
PLATELET # BLD AUTO: 236 K/UL — SIGNIFICANT CHANGE UP (ref 150–400)
POTASSIUM SERPL-MCNC: 4.7 MMOL/L — SIGNIFICANT CHANGE UP (ref 3.5–5.3)
POTASSIUM SERPL-SCNC: 4.7 MMOL/L — SIGNIFICANT CHANGE UP (ref 3.5–5.3)
RBC # BLD: 4.38 M/UL — SIGNIFICANT CHANGE UP (ref 3.8–5.2)
RBC # FLD: 12.6 % — SIGNIFICANT CHANGE UP (ref 10.3–14.5)
SODIUM SERPL-SCNC: 139 MMOL/L — SIGNIFICANT CHANGE UP (ref 135–145)
WBC # BLD: 6.4 K/UL — SIGNIFICANT CHANGE UP (ref 3.8–10.5)
WBC # FLD AUTO: 6.4 K/UL — SIGNIFICANT CHANGE UP (ref 3.8–10.5)

## 2019-05-22 PROCEDURE — 85610 PROTHROMBIN TIME: CPT

## 2019-05-22 PROCEDURE — C1894: CPT

## 2019-05-22 PROCEDURE — 99238 HOSP IP/OBS DSCHRG MGMT 30/<: CPT

## 2019-05-22 PROCEDURE — 85027 COMPLETE CBC AUTOMATED: CPT

## 2019-05-22 PROCEDURE — C1769: CPT

## 2019-05-22 PROCEDURE — C1887: CPT

## 2019-05-22 PROCEDURE — 93010 ELECTROCARDIOGRAM REPORT: CPT

## 2019-05-22 PROCEDURE — C9600: CPT | Mod: LD

## 2019-05-22 PROCEDURE — C1725: CPT

## 2019-05-22 PROCEDURE — 82962 GLUCOSE BLOOD TEST: CPT

## 2019-05-22 PROCEDURE — 80048 BASIC METABOLIC PNL TOTAL CA: CPT

## 2019-05-22 PROCEDURE — 99152 MOD SED SAME PHYS/QHP 5/>YRS: CPT

## 2019-05-22 PROCEDURE — 80053 COMPREHEN METABOLIC PANEL: CPT

## 2019-05-22 PROCEDURE — 93005 ELECTROCARDIOGRAM TRACING: CPT

## 2019-05-22 PROCEDURE — C1874: CPT

## 2019-05-22 PROCEDURE — 85730 THROMBOPLASTIN TIME PARTIAL: CPT

## 2019-05-22 RX ORDER — METFORMIN HYDROCHLORIDE 850 MG/1
1 TABLET ORAL
Qty: 0 | Refills: 0 | DISCHARGE

## 2019-05-22 RX ORDER — CLOPIDOGREL BISULFATE 75 MG/1
1 TABLET, FILM COATED ORAL
Qty: 0 | Refills: 0 | DISCHARGE
Start: 2019-05-22

## 2019-05-22 RX ADMIN — Medication 1: at 13:01

## 2019-05-22 RX ADMIN — PANTOPRAZOLE SODIUM 40 MILLIGRAM(S): 20 TABLET, DELAYED RELEASE ORAL at 05:15

## 2019-05-22 RX ADMIN — CLOPIDOGREL BISULFATE 75 MILLIGRAM(S): 75 TABLET, FILM COATED ORAL at 09:59

## 2019-05-22 RX ADMIN — Medication 40 MILLIGRAM(S): at 05:15

## 2019-05-22 RX ADMIN — AMLODIPINE BESYLATE 5 MILLIGRAM(S): 2.5 TABLET ORAL at 05:15

## 2019-05-22 RX ADMIN — LISINOPRIL 5 MILLIGRAM(S): 2.5 TABLET ORAL at 05:15

## 2019-05-22 RX ADMIN — CARVEDILOL PHOSPHATE 12.5 MILLIGRAM(S): 80 CAPSULE, EXTENDED RELEASE ORAL at 09:59

## 2019-05-22 RX ADMIN — Medication 1: at 09:58

## 2019-05-22 RX ADMIN — Medication 81 MILLIGRAM(S): at 09:59

## 2019-05-22 NOTE — CHART NOTE - NSCHARTNOTEFT_GEN_A_CORE
Removal of Radial Band    Pulses in the right upper extremity are palpable. The patient was placed in the supine position. The insertion site was identified and the band deflated per protocol. The radial band was removed slowly. Direct pressure was applied for 10 minutes.      Monitoring of the right wrist and both upper extremities including neuro-vascular checks and vital signs every 15 minutes x 4.    Complications: None    Comments:

## 2019-05-22 NOTE — DISCHARGE NOTE PROVIDER - NSDCCPCAREPLAN_GEN_ALL_CORE_FT
PRINCIPAL DISCHARGE DIAGNOSIS  Diagnosis: CAD (coronary artery disease)  Assessment and Plan of Treatment: Coronary artery disease is a condition where the arteries the supply the heart muscle get clogged with fatty deposits & puts you at risk for a heart attack.  Call your doctor if you have any new pain, pressure, or discomfort in the center of your chest, pain, tingling or discomfort in arms, back, neck, jaw, or stomach, shortness of breath, nausea, vomiting, burping or heartburn, sweating, cold and clammy skin, racing or abnormal heartbeat for more than 10 minutes or if they keep coming & going.  Call 911 and do not try to get to hospital by car.  You can help yourself with lifestyle changes (quitting smoking if you smoke), eat lots of fruits & vegetables & low fat dairy products, not a lot of meat & fatty foods, walk or some form of physical activity most days of the week, lose weight if you are overweight.  Take your cardiac medication as prescribed to lower cholesterol, to lower blood pressure, and control your blood sugar.      SECONDARY DISCHARGE DIAGNOSES  Diagnosis: Factor V Leiden  Assessment and Plan of Treatment: Continue taking "blood thinners" as prescribed.  Blood thinners can result in abnormal bleeding and brushing.  Be mindful to avoid accidental trauma.  Participate in activities as prescribed.  If you develop new leg pain, swelling, and/or redness contact your healthcare provider.  Call your doctor if you experience lightheadedness, loss of consciousness, shortness of breath (especially with exercise), feel your heart racing or beating unusually, or experience frequent or abnormal bleeding.    Diagnosis: Diabetes  Assessment and Plan of Treatment: RESUME METFORMIN ON 5/24 IN THE MORNING.  Make sure you get your HgA1c checked every three months.  Check your blood glucose at least two times a day.  It's important not to skip any meals.  Keep a log of your blood glucose results and always take it with you to your doctor appointments.  Keep a list of your current medications including over the counter medications and bring this medication list with you to all your doctor appointments.  If you have not seen your ophthalmologist this year, call for appointment.  Check your feet daily for redness, sores, or openings.  Do not self treat.  If there is no improvement in two days, call your primary care physician for an appointment.    Diagnosis: Hypertension  Assessment and Plan of Treatment: Continue to follow a low salt/sodium diet.  Perform physical activities as tolerated in consultation with your Primary Care Provider and physical therapist.  Take all medications as prescribed.  Follow up with your medical doctor for routine blood pressure monitoring at your next visit.  Notify your doctor if you have any of the following symptoms:  Dizziness, lightheadedness, blurry vision, headache, chest pain, or shortness of breath.

## 2019-05-22 NOTE — DISCHARGE NOTE PROVIDER - CARE PROVIDER_API CALL
Levi Larson)  Cardiology  [address], [address]  [place] [address]  Phone: [phone]  Fax: [phone]  Follow Up Time: 1 week

## 2019-05-22 NOTE — PROGRESS NOTE ADULT - ASSESSMENT
74 yo  female PMH of CAD with stents x3 (2001 [facility]), remote MI (2001), factor V Leiden on warfarin (last dose Tuesday 5/14/19), DMT2 (A1c -7.6 5/17/19, uncomplicated as per patient, relocating would need to establish), JAIMIE-uses own CPAP,  CAD/PCI of RCA on 5/17/19, presents for Staged PCI of LAD with RRA access.     - cont ASA, plavix, statin  - cont BB, ACEi  - no further cardiac work up 72 yo  female PMH of CAD with stents x3 (2001 [facility]), remote MI (2001), factor V Leiden on warfarin (last dose Tuesday 5/14/19), DMT2 (A1c -7.6 5/17/19, uncomplicated as per patient, relocating would need to establish), JAIMIE-uses own CPAP,  CAD/PCI of RCA on 5/17/19, now s/p EZEQUIEL to LAD with RRA access on 5/21.     - cont ASA, plavix, statin  - cont BB, ACEi  - no further cardiac work up 74 yo  female PMH of CAD with stents x3 (2001 [facility]), remote MI (2001), factor V Leiden on warfarin (last dose Tuesday 5/14/19), DMT2 (A1c -7.6 5/17/19, uncomplicated as per patient, relocating would need to establish), JAIMIE-uses own CPAP,  CAD/PCI of RCA on 5/17/19, now s/p EZEQUIEL to LAD with RRA access on 5/21.     - cont ASA, plavix, statin  - cont BB, ACEi  - no objection to discharge from cardiology perspective 72 yo  female PMH of CAD with stents x3 (2001 [facility]), remote MI (2001), factor V Leiden on warfarin (last dose Tuesday 5/14/19), DMT2 (A1c -7.6 5/17/19, uncomplicated as per patient, relocating would need to establish), JAIMIE-uses own CPAP,  CAD/PCI of RCA on 5/17/19, now s/p EZEQUIEL to LAD with RRA access on 5/21.     - cont ASA, plavix, statin  - cont BB, ACEi  - medically optimized to follow up with cardiologist as outpatient

## 2019-05-22 NOTE — PROGRESS NOTE ADULT - SUBJECTIVE AND OBJECTIVE BOX
For all Cardiology service contact information, go to [email or web address] and use "[email or web address]" to login.      Overnight Events: JERONIMO. No CP, SOB.    Medications:  amLODIPine   Tablet 5 milliGRAM(s) Oral daily  aspirin enteric coated 81 milliGRAM(s) Oral daily  atorvastatin 40 milliGRAM(s) Oral at bedtime  carvedilol 12.5 milliGRAM(s) Oral every 12 hours  clopidogrel Tablet 75 milliGRAM(s) Oral daily  dextrose 40% Gel 15 Gram(s) Oral once PRN  dextrose 5%. 1000 milliLiter(s) IV Continuous <Continuous>  dextrose 50% Injectable 12.5 Gram(s) IV Push once  dextrose 50% Injectable 25 Gram(s) IV Push once  dextrose 50% Injectable 25 Gram(s) IV Push once  furosemide    Tablet 40 milliGRAM(s) Oral daily  glucagon  Injectable 1 milliGRAM(s) IntraMuscular once PRN  insulin lispro (HumaLOG) corrective regimen sliding scale   SubCutaneous three times a day before meals  insulin lispro (HumaLOG) corrective regimen sliding scale   SubCutaneous at bedtime  lisinopril 5 milliGRAM(s) Oral daily  pantoprazole    Tablet 40 milliGRAM(s) Oral before breakfast      REVIEW OF SYSTEMS:  CONSTITUTIONAL: No weakness, fevers or chills  EYES/ENT: No visual changes;  No vertigo or throat pain   NECK: No pain or stiffness  RESPIRATORY: No cough, wheezing, hemoptysis; No shortness of breath  CARDIOVASCULAR: No chest pain or palpitations  GASTROINTESTINAL: No abdominal pain. No nausea, vomiting, or hematemesis; No diarrhea or constipation. No melena or hematochezia.  GENITOURINARY: No dysuria, frequency or hematuria  NEUROLOGICAL: No numbness or weakness  SKIN: No itching or rash  All other review of systems is negative unless indicated above    Vitals:  T(F): 98.2 (05-22), Max: 98.2 (05-22)  HR: 75 (05-22) (57 - 75)  BP: 141/71 (05-22) (90/60 - 174/79)  RR: 18 (05-22)  SpO2: 96% (05-22)  I&O's Summary    21 May 2019 07:01  -  22 May 2019 07:00  --------------------------------------------------------  IN: 400 mL / OUT: 0 mL / NET: 400 mL    22 May 2019 07:01  -  22 May 2019 10:38  --------------------------------------------------------  IN: 180 mL / OUT: 0 mL / NET: 180 mL      Physical Exam:  Appearance: No acute distress; well appearing  Eyes: PERRL, EOMI, pink conjunctiva  HENT: Normal oral muscosa  Cardiovascular: RRR, S1, S2, no murmurs, rubs, or gallops; no edema; no JVD  Respiratory: Clear to auscultation bilaterally  Gastrointestinal: soft, non-tender, non-distended with normal bowel sounds  Musculoskeletal: No clubbing; no joint deformity   Neurologic: Non-focal  Extremities: RRA access site with palpable pulses without hematoma  Psychiatry: AAOx3, mood & affect appropriate  Skin: No rashes, ecchymoses, or cyanosis                          12.7   6.4   )-----------( 236      ( 22 May 2019 06:51 )             38.7     05-22    139  |  99  |  23  ----------------------------<  162<H>  4.7   |  27  |  0.89    Ca    9.7      22 May 2019 06:51    TPro  8.8<H>  /  Alb  4.8  /  TBili  0.5  /  DBili  x   /  AST  34  /  ALT  42  /  AlkPhos  54  05-21    PT/INR - ( 21 May 2019 16:48 )   PT: 12.4 sec;   INR: 1.08 ratio         PTT - ( 21 May 2019 16:48 )  PTT:28.0 sec    Interpretation of Telemetry:  SR 70-80

## 2019-05-22 NOTE — DISCHARGE NOTE NURSING/CASE MANAGEMENT/SOCIAL WORK - NSDCDPATPORTLINK_GEN_ALL_CORE
You can access the [email or web address], offered by [facility], by registering with the following website: [email or web address]

## 2019-05-22 NOTE — DISCHARGE NOTE PROVIDER - NSDCFUADDAPPT_GEN_ALL_CORE_FT
Follow up with your Primary Care Doctor within 1 week.  Follow up with your Cardiologist within 1 week.

## 2019-05-22 NOTE — DISCHARGE NOTE PROVIDER - HOSPITAL COURSE
72 yo  female PMH of CAD with stents x3 (2001 [facility]), remote MI (2001), factor V Leiden on warfarin (last dose Tuesday 5/14/19), DMT2 (A1c -7.6 5/17/19, uncomplicated as per patient, relocating would need to establish), JAIMIE-uses own CPAP,  CAD/PCI of RCA on 5/17/19, now s/p EZEQUIEL to LAD with RRA access on 5/21.  Patient has been seen and Cleared for discharge by Cath Fellow - will resume coumadin at home dose.

## 2019-05-28 ENCOUNTER — APPOINTMENT (OUTPATIENT)
Dept: CARDIOLOGY | Facility: CLINIC | Age: 74
End: 2019-05-28
Payer: MEDICARE

## 2019-05-28 ENCOUNTER — NON-APPOINTMENT (OUTPATIENT)
Age: 74
End: 2019-05-28

## 2019-05-28 VITALS
OXYGEN SATURATION: 97 % | HEART RATE: 73 BPM | RESPIRATION RATE: 17 BRPM | HEIGHT: 62 IN | BODY MASS INDEX: 42.69 KG/M2 | WEIGHT: 232 LBS | SYSTOLIC BLOOD PRESSURE: 149 MMHG | DIASTOLIC BLOOD PRESSURE: 80 MMHG

## 2019-05-28 DIAGNOSIS — Z87.898 PERSONAL HISTORY OF OTHER SPECIFIED CONDITIONS: ICD-10-CM

## 2019-05-28 PROCEDURE — 93000 ELECTROCARDIOGRAM COMPLETE: CPT

## 2019-05-28 PROCEDURE — 99215 OFFICE O/P EST HI 40 MIN: CPT

## 2019-05-30 LAB
INR PPP: 1.6
INR PPP: 2

## 2019-06-10 LAB — INR PPP: 2

## 2019-06-10 RX ORDER — OMEPRAZOLE 20 MG/1
20 CAPSULE, DELAYED RELEASE ORAL
Qty: 90 | Refills: 1 | Status: DISCONTINUED | COMMUNITY
Start: 2018-07-20 | End: 2019-06-10

## 2019-06-28 ENCOUNTER — RX RENEWAL (OUTPATIENT)
Age: 74
End: 2019-06-28

## 2019-07-05 LAB — INR PPP: 4.5

## 2019-07-22 ENCOUNTER — MEDICATION RENEWAL (OUTPATIENT)
Age: 74
End: 2019-07-22

## 2019-08-05 ENCOUNTER — RX RENEWAL (OUTPATIENT)
Age: 74
End: 2019-08-05

## 2019-08-30 ENCOUNTER — NON-APPOINTMENT (OUTPATIENT)
Age: 74
End: 2019-08-30

## 2019-08-30 ENCOUNTER — APPOINTMENT (OUTPATIENT)
Dept: CARDIOLOGY | Facility: CLINIC | Age: 74
End: 2019-08-30
Payer: MEDICARE

## 2019-08-30 VITALS
HEART RATE: 80 BPM | WEIGHT: 236 LBS | BODY MASS INDEX: 43.43 KG/M2 | SYSTOLIC BLOOD PRESSURE: 152 MMHG | OXYGEN SATURATION: 97 % | HEIGHT: 62 IN | DIASTOLIC BLOOD PRESSURE: 84 MMHG | RESPIRATION RATE: 17 BRPM

## 2019-08-30 PROBLEM — G47.33 OBSTRUCTIVE SLEEP APNEA (ADULT) (PEDIATRIC): Chronic | Status: ACTIVE | Noted: 2019-05-21

## 2019-08-30 PROCEDURE — 99215 OFFICE O/P EST HI 40 MIN: CPT

## 2019-08-30 PROCEDURE — 93000 ELECTROCARDIOGRAM COMPLETE: CPT

## 2019-08-30 RX ORDER — ENOXAPARIN SODIUM 300 MG/3ML
INJECTION INTRAVENOUS; SUBCUTANEOUS
Refills: 0 | Status: DISCONTINUED | COMMUNITY
End: 2019-08-30

## 2019-09-09 ENCOUNTER — RX RENEWAL (OUTPATIENT)
Age: 74
End: 2019-09-09

## 2019-10-04 ENCOUNTER — RX RENEWAL (OUTPATIENT)
Age: 74
End: 2019-10-04

## 2019-11-21 ENCOUNTER — RX RENEWAL (OUTPATIENT)
Age: 74
End: 2019-11-21

## 2019-12-20 ENCOUNTER — APPOINTMENT (OUTPATIENT)
Dept: CARDIOLOGY | Facility: CLINIC | Age: 74
End: 2019-12-20
Payer: MEDICARE

## 2019-12-20 ENCOUNTER — NON-APPOINTMENT (OUTPATIENT)
Age: 74
End: 2019-12-20

## 2019-12-20 VITALS
DIASTOLIC BLOOD PRESSURE: 82 MMHG | HEIGHT: 62 IN | BODY MASS INDEX: 40.85 KG/M2 | RESPIRATION RATE: 17 BRPM | WEIGHT: 222 LBS | HEART RATE: 75 BPM | OXYGEN SATURATION: 98 % | SYSTOLIC BLOOD PRESSURE: 142 MMHG

## 2019-12-20 PROCEDURE — 99215 OFFICE O/P EST HI 40 MIN: CPT

## 2019-12-20 PROCEDURE — 93306 TTE W/DOPPLER COMPLETE: CPT

## 2019-12-20 PROCEDURE — 93000 ELECTROCARDIOGRAM COMPLETE: CPT

## 2020-01-03 ENCOUNTER — RX RENEWAL (OUTPATIENT)
Age: 75
End: 2020-01-03

## 2020-01-25 DIAGNOSIS — E87.6 HYPOKALEMIA: ICD-10-CM

## 2020-01-25 RX ORDER — POTASSIUM CHLORIDE 20 MEQ/1
TABLET, EXTENDED RELEASE ORAL
Qty: 180 TABLET | Refills: 0 | Status: SHIPPED | OUTPATIENT
Start: 2020-01-25 | End: 2020-03-12

## 2020-01-27 ENCOUNTER — RX RENEWAL (OUTPATIENT)
Age: 75
End: 2020-01-27

## 2020-01-31 LAB
INR PPP: 2.5
INR PPP: 3.1

## 2020-02-20 ENCOUNTER — INPATIENT (INPATIENT)
Facility: HOSPITAL | Age: 75
LOS: 0 days | Discharge: HOME | DRG: 313 | End: 2020-02-21
Attending: INTERNAL MEDICINE | Admitting: HOSPITALIST
Payer: COMMERCIAL

## 2020-02-20 VITALS
SYSTOLIC BLOOD PRESSURE: 186 MMHG | WEIGHT: 227.96 LBS | DIASTOLIC BLOOD PRESSURE: 84 MMHG | OXYGEN SATURATION: 99 % | TEMPERATURE: 97 F | HEART RATE: 72 BPM | RESPIRATION RATE: 20 BRPM | HEIGHT: 62 IN

## 2020-02-20 DIAGNOSIS — R07.9 CHEST PAIN, UNSPECIFIED: ICD-10-CM

## 2020-02-20 DIAGNOSIS — Z90.89 ACQUIRED ABSENCE OF OTHER ORGANS: Chronic | ICD-10-CM

## 2020-02-20 DIAGNOSIS — Z90.710 ACQUIRED ABSENCE OF BOTH CERVIX AND UTERUS: Chronic | ICD-10-CM

## 2020-02-20 DIAGNOSIS — Z87.39 PERSONAL HISTORY OF OTHER DISEASES OF THE MUSCULOSKELETAL SYSTEM AND CONNECTIVE TISSUE: Chronic | ICD-10-CM

## 2020-02-20 DIAGNOSIS — T14.90XA INJURY, UNSPECIFIED, INITIAL ENCOUNTER: Chronic | ICD-10-CM

## 2020-02-20 DIAGNOSIS — Z98.890 OTHER SPECIFIED POSTPROCEDURAL STATES: Chronic | ICD-10-CM

## 2020-02-20 DIAGNOSIS — Z90.49 ACQUIRED ABSENCE OF OTHER SPECIFIED PARTS OF DIGESTIVE TRACT: Chronic | ICD-10-CM

## 2020-02-20 LAB
ALBUMIN SERPL ELPH-MCNC: 3.8 G/DL — SIGNIFICANT CHANGE UP (ref 3.3–5)
ALP SERPL-CCNC: 43 U/L — SIGNIFICANT CHANGE UP (ref 40–120)
ALT FLD-CCNC: 30 U/L — SIGNIFICANT CHANGE UP (ref 10–45)
ANION GAP SERPL CALC-SCNC: 10 MMOL/L — SIGNIFICANT CHANGE UP (ref 5–17)
APTT BLD: 34.8 SEC — SIGNIFICANT CHANGE UP (ref 27.5–36.3)
AST SERPL-CCNC: 25 U/L — SIGNIFICANT CHANGE UP (ref 10–40)
BILIRUB SERPL-MCNC: 0.5 MG/DL — SIGNIFICANT CHANGE UP (ref 0.2–1.2)
BUN SERPL-MCNC: 26 MG/DL — HIGH (ref 7–23)
CALCIUM SERPL-MCNC: 8.8 MG/DL — SIGNIFICANT CHANGE UP (ref 8.4–10.5)
CHLORIDE SERPL-SCNC: 103 MMOL/L — SIGNIFICANT CHANGE UP (ref 96–108)
CO2 SERPL-SCNC: 27 MMOL/L — SIGNIFICANT CHANGE UP (ref 22–31)
CREAT SERPL-MCNC: 0.86 MG/DL — SIGNIFICANT CHANGE UP (ref 0.5–1.3)
D DIMER BLD IA.RAPID-MCNC: 313 NG/ML DDU — HIGH
GLUCOSE BLDC GLUCOMTR-MCNC: 141 MG/DL — HIGH (ref 70–99)
GLUCOSE BLDC GLUCOMTR-MCNC: 83 MG/DL — SIGNIFICANT CHANGE UP (ref 70–99)
GLUCOSE SERPL-MCNC: 102 MG/DL — HIGH (ref 70–99)
HCT VFR BLD CALC: 36.6 % — SIGNIFICANT CHANGE UP (ref 34.5–45)
HGB BLD-MCNC: 12.3 G/DL — SIGNIFICANT CHANGE UP (ref 11.5–15.5)
INR BLD: 1.59 RATIO — HIGH (ref 0.88–1.16)
MCHC RBC-ENTMCNC: 29.9 PG — SIGNIFICANT CHANGE UP (ref 27–34)
MCHC RBC-ENTMCNC: 33.6 GM/DL — SIGNIFICANT CHANGE UP (ref 32–36)
MCV RBC AUTO: 88.8 FL — SIGNIFICANT CHANGE UP (ref 80–100)
NRBC # BLD: 0 /100 WBCS — SIGNIFICANT CHANGE UP (ref 0–0)
PLATELET # BLD AUTO: 202 K/UL — SIGNIFICANT CHANGE UP (ref 150–400)
POTASSIUM SERPL-MCNC: 4.2 MMOL/L — SIGNIFICANT CHANGE UP (ref 3.5–5.3)
POTASSIUM SERPL-SCNC: 4.2 MMOL/L — SIGNIFICANT CHANGE UP (ref 3.5–5.3)
PROT SERPL-MCNC: 8.1 G/DL — SIGNIFICANT CHANGE UP (ref 6–8.3)
PROTHROM AB SERPL-ACNC: 18.1 SEC — HIGH (ref 10–12.9)
RBC # BLD: 4.12 M/UL — SIGNIFICANT CHANGE UP (ref 3.8–5.2)
RBC # FLD: 12.8 % — SIGNIFICANT CHANGE UP (ref 10.3–14.5)
SODIUM SERPL-SCNC: 140 MMOL/L — SIGNIFICANT CHANGE UP (ref 135–145)
TROPONIN I SERPL-MCNC: <.017 NG/ML — LOW (ref 0.02–0.06)
TROPONIN I SERPL-MCNC: <.017 NG/ML — LOW (ref 0.02–0.06)
WBC # BLD: 7.41 K/UL — SIGNIFICANT CHANGE UP (ref 3.8–10.5)
WBC # FLD AUTO: 7.41 K/UL — SIGNIFICANT CHANGE UP (ref 3.8–10.5)

## 2020-02-20 PROCEDURE — 99223 1ST HOSP IP/OBS HIGH 75: CPT

## 2020-02-20 PROCEDURE — 99285 EMERGENCY DEPT VISIT HI MDM: CPT

## 2020-02-20 PROCEDURE — 71046 X-RAY EXAM CHEST 2 VIEWS: CPT | Mod: 26

## 2020-02-20 PROCEDURE — 93010 ELECTROCARDIOGRAM REPORT: CPT

## 2020-02-20 RX ORDER — DEXTROSE 50 % IN WATER 50 %
25 SYRINGE (ML) INTRAVENOUS ONCE
Refills: 0 | Status: DISCONTINUED | OUTPATIENT
Start: 2020-02-20 | End: 2020-02-21

## 2020-02-20 RX ORDER — ZINC SULFATE TAB 220 MG (50 MG ZINC EQUIVALENT) 220 (50 ZN) MG
220 TAB ORAL DAILY
Refills: 0 | Status: DISCONTINUED | OUTPATIENT
Start: 2020-02-21 | End: 2020-02-21

## 2020-02-20 RX ORDER — REGADENOSON 0.08 MG/ML
0.4 INJECTION, SOLUTION INTRAVENOUS ONCE
Refills: 0 | Status: DISCONTINUED | OUTPATIENT
Start: 2020-02-20 | End: 2020-02-20

## 2020-02-20 RX ORDER — ZINC SULFATE TAB 220 MG (50 MG ZINC EQUIVALENT) 220 (50 ZN) MG
1 TAB ORAL
Qty: 0 | Refills: 0 | DISCHARGE

## 2020-02-20 RX ORDER — INSULIN LISPRO 100/ML
VIAL (ML) SUBCUTANEOUS AT BEDTIME
Refills: 0 | Status: DISCONTINUED | OUTPATIENT
Start: 2020-02-20 | End: 2020-02-21

## 2020-02-20 RX ORDER — CLOPIDOGREL BISULFATE 75 MG/1
75 TABLET, FILM COATED ORAL DAILY
Refills: 0 | Status: DISCONTINUED | OUTPATIENT
Start: 2020-02-21 | End: 2020-02-21

## 2020-02-20 RX ORDER — POTASSIUM CHLORIDE 20 MEQ
20 PACKET (EA) ORAL DAILY
Refills: 0 | Status: DISCONTINUED | OUTPATIENT
Start: 2020-02-21 | End: 2020-02-21

## 2020-02-20 RX ORDER — PANTOPRAZOLE SODIUM 20 MG/1
40 TABLET, DELAYED RELEASE ORAL
Refills: 0 | Status: DISCONTINUED | OUTPATIENT
Start: 2020-02-20 | End: 2020-02-21

## 2020-02-20 RX ORDER — GLUCAGON INJECTION, SOLUTION 0.5 MG/.1ML
1 INJECTION, SOLUTION SUBCUTANEOUS ONCE
Refills: 0 | Status: DISCONTINUED | OUTPATIENT
Start: 2020-02-20 | End: 2020-02-21

## 2020-02-20 RX ORDER — CARVEDILOL PHOSPHATE 80 MG/1
12.5 CAPSULE, EXTENDED RELEASE ORAL EVERY 12 HOURS
Refills: 0 | Status: DISCONTINUED | OUTPATIENT
Start: 2020-02-20 | End: 2020-02-21

## 2020-02-20 RX ORDER — DEXTROSE 50 % IN WATER 50 %
15 SYRINGE (ML) INTRAVENOUS ONCE
Refills: 0 | Status: DISCONTINUED | OUTPATIENT
Start: 2020-02-20 | End: 2020-02-21

## 2020-02-20 RX ORDER — CARVEDILOL PHOSPHATE 80 MG/1
1 CAPSULE, EXTENDED RELEASE ORAL
Qty: 0 | Refills: 0 | DISCHARGE

## 2020-02-20 RX ORDER — AMLODIPINE BESYLATE 2.5 MG/1
1 TABLET ORAL
Qty: 0 | Refills: 0 | DISCHARGE

## 2020-02-20 RX ORDER — WARFARIN SODIUM 2.5 MG/1
7.5 TABLET ORAL ONCE
Refills: 0 | Status: COMPLETED | OUTPATIENT
Start: 2020-02-20 | End: 2020-02-20

## 2020-02-20 RX ORDER — FUROSEMIDE 40 MG
40 TABLET ORAL DAILY
Refills: 0 | Status: DISCONTINUED | OUTPATIENT
Start: 2020-02-20 | End: 2020-02-21

## 2020-02-20 RX ORDER — GLIMEPIRIDE 1 MG
1 TABLET ORAL
Qty: 0 | Refills: 0 | DISCHARGE

## 2020-02-20 RX ORDER — INSULIN LISPRO 100/ML
VIAL (ML) SUBCUTANEOUS
Refills: 0 | Status: DISCONTINUED | OUTPATIENT
Start: 2020-02-20 | End: 2020-02-21

## 2020-02-20 RX ORDER — ATORVASTATIN CALCIUM 80 MG/1
40 TABLET, FILM COATED ORAL AT BEDTIME
Refills: 0 | Status: DISCONTINUED | OUTPATIENT
Start: 2020-02-20 | End: 2020-02-21

## 2020-02-20 RX ORDER — SODIUM CHLORIDE 9 MG/ML
1000 INJECTION, SOLUTION INTRAVENOUS
Refills: 0 | Status: DISCONTINUED | OUTPATIENT
Start: 2020-02-20 | End: 2020-02-21

## 2020-02-20 RX ORDER — DEXTROSE 50 % IN WATER 50 %
12.5 SYRINGE (ML) INTRAVENOUS ONCE
Refills: 0 | Status: DISCONTINUED | OUTPATIENT
Start: 2020-02-20 | End: 2020-02-21

## 2020-02-20 RX ORDER — OMEPRAZOLE 10 MG/1
1 CAPSULE, DELAYED RELEASE ORAL
Qty: 0 | Refills: 0 | DISCHARGE

## 2020-02-20 RX ORDER — AMLODIPINE BESYLATE 2.5 MG/1
5 TABLET ORAL DAILY
Refills: 0 | Status: DISCONTINUED | OUTPATIENT
Start: 2020-02-21 | End: 2020-02-21

## 2020-02-20 RX ORDER — PANTOPRAZOLE SODIUM 20 MG/1
1 TABLET, DELAYED RELEASE ORAL
Qty: 0 | Refills: 0 | DISCHARGE

## 2020-02-20 RX ORDER — ACETAMINOPHEN 500 MG
650 TABLET ORAL EVERY 6 HOURS
Refills: 0 | Status: DISCONTINUED | OUTPATIENT
Start: 2020-02-20 | End: 2020-02-21

## 2020-02-20 RX ORDER — HEPARIN SODIUM 5000 [USP'U]/ML
5000 INJECTION INTRAVENOUS; SUBCUTANEOUS EVERY 12 HOURS
Refills: 0 | Status: DISCONTINUED | OUTPATIENT
Start: 2020-02-20 | End: 2020-02-21

## 2020-02-20 RX ORDER — POTASSIUM CHLORIDE 20 MEQ
1 PACKET (EA) ORAL
Qty: 0 | Refills: 0 | DISCHARGE

## 2020-02-20 RX ADMIN — CARVEDILOL PHOSPHATE 12.5 MILLIGRAM(S): 80 CAPSULE, EXTENDED RELEASE ORAL at 23:31

## 2020-02-20 RX ADMIN — WARFARIN SODIUM 7.5 MILLIGRAM(S): 2.5 TABLET ORAL at 23:32

## 2020-02-20 RX ADMIN — ATORVASTATIN CALCIUM 40 MILLIGRAM(S): 80 TABLET, FILM COATED ORAL at 23:31

## 2020-02-20 RX ADMIN — Medication 0: at 22:41

## 2020-02-20 NOTE — ED ADULT TRIAGE NOTE - CHIEF COMPLAINT QUOTE
Pt presents to the ER complaining of chest pain that started this morning, pt called cardiology and she was sent to the ER. PT has stent placed last year.

## 2020-02-20 NOTE — H&P ADULT - NSHPREVIEWOFSYSTEMS_GEN_ALL_CORE
REVIEW OF SYSTEMS:  CONSTITUTIONAL: No fever, weight loss, or fatigue  EYES: No eye pain, visual disturbances, or discharge  ENMT:  No difficulty hearing, tinnitus, vertigo; No sinus or throat pain  NECK: No neck pain or neck stiffness  BREASTS: No pain, masses, or nipple discharge  RESPIRATORY: No cough, wheezing, chills or hemoptysis; No shortness of breath  CARDIOVASCULAR: chest pain 5/10, palpitations, dizziness, or leg swelling  GASTROINTESTINAL: No abdominal pain, No nausea, vomiting, or hematemesis; No diarrhea or constipation  GENITOURINARY: No dysuria, frequency, hematuria, or incontinence  NEUROLOGICAL: No headaches, memory loss, loss of strength, numbness, or tremors  SKIN: No itching, burning, rashes, or lesions   LYMPH NODES: No enlarged glands  ENDOCRINE: No heat or cold intolerance; No hair loss  MUSCULOSKELETAL: No joint pain or swelling; No muscle, back, or extremity pain  PSYCHIATRIC: No depression, anxiety, mood swings, or difficulty sleeping  HEME/LYMPH: No easy bruising or bleeding  ALLERY AND IMMUNOLOGIC: No hives or eczema    All other ROS reviewed and negative except as otherwise stated

## 2020-02-20 NOTE — H&P ADULT - ASSESSMENT
75 y/o female with PMH of CAD (stentsx3 - last on 4/2019), MI (2001),  T2DM, HTN, HLD, Factor V Leiden defficiency (on coumadin for 10 years), JAIMIE nocturnal CPAP), Fibrromylagia, obesity (BMI - 41.7), breast CA s/p lumpectomy and radiation (2015), p/w dull mid sternal chest pain x 2 days.     # Chest pain - given pt's strong cadiac history (MI, CAD- stents), will r/o ACS  vs. PE (positive D-DImer, subtherapeutic INR) vs. GI )pain relieved by burping, passing gas)  1st trop negative, EKG with LBBB, unchanged from previous,   D-Gtabg=711, INR=1.59 - pt. admits skipping a dose of coumadin 2 days ago  - cardiac monitor  - serial trops and EKGs  - npo p midnight  - VQ scan - cannot have CTA due to iodine allergy - pt. states cannot tolerate  - nuclear stress test vs. Cath, however, cannot have VQ scan and nuclear stress test the same day  - cardiology will decide   - Cardiac consult pending     # Factor V Leiden - on coumadin x 10 years  - missed a dose of coumadin 2 days ago  INR subtherapeutic=1.59  - heparin 5000 units SQ bid until INR therapeutic  - coumadin 7.5 mg po tonight (took 10 mg last night)    # CAD - 3 stents, last in 4/2019  - cont. plavix, coreg, statin (therapeutical substitution) at home dose     # HTN - controlled  - cont. coreg and norvasc    # HLD - cont. statin    # JAIMIE - nocturnal CPAP - 4    # T2DM - on glimepiride and metformin at home  - HgA1C in AM   - will start with low dose correction insulin as pt. will be NPO p midnight  - monitor FS    # Fibromyalgia - tylenol    # DVT prophylaxis; heparin SQ unitl INR therapeutic and coumadin 73 y/o female with PMH of CAD (stentsx3 - last on 2019), MI (),  T2DM, HTN, HLD, Factor V Leiden defficiency (on coumadin for 10 years), JAIMIE nocturnal CPAP), Fibrromylagia, obesity (BMI - 41.7), breast CA s/p lumpectomy and radiation (), p/w dull mid sternal chest pain x 2 days.     Chest pain - given pt's strong cadiac history (MI, CAD- stents), will r/o ACS  vs. PE (positive D-DImer, subtherapeutic INR) vs. GI )pain relieved by burping, passing gas)  1st trop negative, EKG with LBBB, unchanged from previous,   D-Mtjjk=652, INR=1.59 - pt. admits skipping a dose of coumadin 2 days ago  - cardiac monitor  - serial trops and EKGs  - npo p midnight  - VQ scan - cannot have CTA due to iodine allergy - pt. states cannot tolerate  - nuclear stress test vs. Cath, however, cannot have VQ scan and nuclear stress test the same day  - cardiology will decide   - Cardiac consult pending     Factor V Leiden - on coumadin x 10 years  - missed a dose of coumadin 2 days ago  INR subtherapeutic=1.59  - heparin 5000 units SQ bid until INR therapeutic  - coumadin 7.5 mg po tonight (took 10 mg last night)    CAD - 3 stents, last in 2019  - cont. plavix, coreg, statin (therapeutical substitution) at home dose     HTN   - controlled  - cont. coreg and norvasc    HLD   - cont. statin    JAIMIE   - nocturnal CPAP - 4    T2DM - on glimepiride and metformin at home  - HgA1C in AM   - will start with low dose correction insulin as pt. will be NPO p midnight  - monitor FS    Fibromyalgia - tylenol    DVT prophylaxis; heparin SQ until INR therapeutic and coumadin     CAPRINI SCORE [CLOT]    AGE RELATED RISK FACTORS                                                       MOBILITY RELATED FACTORS  [ ] Age 41-60 years                                            (1 Point)                  [ ] Bed rest                                                        (1 Point)  [ ] Age: 61-74 years                                           (2 Points)                 [ ] Plaster cast                                                   (2 Points)  [X ] Age= 75 years                                              (3 Points)                 [ ] Bed bound for more than 72 hours                 (2 Points)    DISEASE RELATED RISK FACTORS                                               GENDER SPECIFIC FACTORS  [ ] Edema in the lower extremities                       (1 Point)                  [ ] Pregnancy                                                     (1 Point)  [ ] Varicose veins                                               (1 Point)                  [ ] Post-partum < 6 weeks                                   (1 Point)             [ X] BMI > 25 Kg/m2                                            (1 Point)                  [ ] Hormonal therapy  or oral contraception          (1 Point)                 [ ] Sepsis (in the previous month)                        (1 Point)                  [ ] History of pregnancy complications                 (1 point)  [ ] Pneumonia or serious lung disease                                               [ ] Unexplained or recurrent                     (1 Point)           (in the previous month)                               (1 Point)  [ ] Abnormal pulmonary function test                     (1 Point)                 SURGERY RELATED RISK FACTORS  [ ] Acute myocardial infarction                              (1 Point)                 [ ]  Section                                             (1 Point)  [ ] Congestive heart failure (in the previous month)  (1 Point)               [ ] Minor surgery                                                  (1 Point)   [ ] Inflammatory bowel disease                             (1 Point)                 [ ] Arthroscopic surgery                                        (2 Points)  [ ] Central venous access                                      (2 Points)                [ ] General surgery lasting more than 45 minutes   (2 Points)       [ ] Stroke (in the previous month)                          (5 Points)               [ ] Elective arthroplasty                                         (5 Points)                                                                                                                                               HEMATOLOGY RELATED FACTORS                                                 TRAUMA RELATED RISK FACTORS  [ ] Prior episodes of VTE                                     (3 Points)                [ ] Fracture of the hip, pelvis, or leg                       (5 Points)  [ ] Positive family history for VTE                         (3 Points)                 [ ] Acute spinal cord injury (in the previous month)  (5 Points)  [ ] Prothrombin 15125 A                                     (3 Points)                 [ ] Paralysis  (less than 1 month)                             (5 Points)  [ ] Factor V Leiden                                             (3 Points)                  [ ] Multiple Trauma within 1 month                        (5 Points)  [ ] Lupus anticoagulants                                     (3 Points)                                                           [ ] Anticardiolipin antibodies                               (3 Points)                                                       [ ] High homocysteine in the blood                      (3 Points)                                             [ ] Other congenital or acquired thrombophilia      (3 Points)                                                [ ] Heparin induced thrombocytopenia                  (3 Points)                                          Total Score [   4       ]    Caprini Score 0 - 2:  Low Risk, No VTE Prophylaxis required for most patients, encourage ambulation  Caprini Score 3 - 6:  At Risk, pharmacologic VTE prophylaxis is indicated for most patients (in the absence of a contraindication)  Caprini Score Greater than or = 7:  High Risk, pharmacologic VTE prophylaxis is indicated for most patients (in the absence of a contraindication)

## 2020-02-20 NOTE — H&P ADULT - NSICDXPASTMEDICALHX_GEN_ALL_CORE_FT
PAST MEDICAL HISTORY:  CAD (coronary artery disease)     COPD (chronic obstructive pulmonary disease) second hand smoke    Diabetes     DVT (deep venous thrombosis) LLE    Factor V Leiden     Hernia, abdominal     History of coronary artery stent placement     HTN (hypertension)     Lyme disease     Malignant neoplasm of breast (female) lymph nodes removed and lumpectomy (left) 2015  treat with radiation    Myocardial infarct, old 2001    Obesity     JAIMIE on CPAP

## 2020-02-20 NOTE — H&P ADULT - NSHPSOCIALHISTORY_GEN_ALL_CORE
pt. lives in [place] with her son, has a daughter on [place] and visits often  - denies smoking - has been exposed to second hand smoking 30 years ago  - denies drinking  - denies recreational drugs

## 2020-02-20 NOTE — H&P ADULT - NSICDXFAMILYHX_GEN_ALL_CORE_FT
FAMILY HISTORY:  FH: CAD (coronary artery disease)    Sibling  Still living? Unknown  Family history of coronary artery bypass surgery, Age at diagnosis: Age Unknown  FH: cancer, Age at diagnosis: Age Unknown

## 2020-02-20 NOTE — ED ADULT NURSE NOTE - NSIMPLEMENTINTERV_GEN_ALL_ED
Implemented All Universal Safety Interventions:  [place] to call system. Call bell, personal items and telephone within reach. Instruct patient to call for assistance. Room bathroom lighting operational. Non-slip footwear when patient is off stretcher. Physically safe environment: no spills, clutter or unnecessary equipment. Stretcher in lowest position, wheels locked, appropriate side rails in place.

## 2020-02-20 NOTE — H&P ADULT - HISTORY OF PRESENT ILLNESS
73 y/o female with PMH of CAD (stentsx3 - last on 4/2019), MI (2001),  T2DM, HTN, HLD, Factor V Leiden defficiency (on coumadin for 10 years), JAIMIE nocturnal CPAP), Fibrromylagia, obesity (BMI - 41.7), breast CA s/p lumpectomy and radiation (2015), p/w chest pain x 2 days. Pt. states she experienced chest pain starting last night after having dinner, while cleaning the table, pain 5/10, described as dull, in the midchest more to the right, with no radiation, relieved by burping and passing gas, no aggravating factor. Pt. was able to go to sleep, however, when she woke up in the morning at 5 AM to go to the bathroom, she felt the pain again with the same intensity and descriptive factor. Pt. states she still has the same dull, midchest pain. Pt. denies any SOB, jaw pain, diaphoresis, palpitations, nausea, vomiting, abd pain. Pt denies HA, dizziness, denies leg pain/edema, recent travel or prolonged immobilizations. Pt. states she has started a new exercise program - "sil[employer] sneakers" at the [employer] in [place], after last stent in 4/2019.

## 2020-02-20 NOTE — ED PROVIDER NOTE - ATTENDING CONTRIBUTION TO CARE
Eval with DHAVAL Bullock. Pt is 75 y/o female with Pmhx of DM, CAD x 3 stents, breast ca s/p lumpectomy and radiation, fibromyalgia, Leiden V deficiency here for eval of midsternal CP with radiation to Rt side of chest x 2 days. Pain is sharp, nonexertional, not associated with SOB, jaw pain, diaphoresis, palpitations, nausea, vomiting, abd pain. Pt denies HA, dizziness, denies leg pain/edema, recent travel or prolonged immobilizations.  cardio - Dr tomlin; Pt admits that she has been doing new exercise regimen as part of [employer] program; CXR clear, will f/u on labs, if INR subtherapeutic will get CTA chest;  I performed a face to face bedside interview with patient regarding history of present illness, review of symptoms and past medical history. I completed an independent physical exam.  I have discussed the patient's plan of care with Physician Assistant (PA). I agree with note as stated above, having amended the EMR as needed to reflect my findings.   This includes History of Present Illness, HIV, Past Medical/Surgical/Family/Social History, Allergies and Home Medications, Review of Systems, Physical Exam, and any Progress Notes during the time I functioned as the attending physician for this patient.

## 2020-02-20 NOTE — ED ADULT NURSE NOTE - PMH
CAD (coronary artery disease)    COPD (chronic obstructive pulmonary disease)  second hand smoke  Diabetes    DVT (deep venous thrombosis)  LLE  Factor V Leiden    Hernia, abdominal    History of coronary artery stent placement  x3 [facility]  HTN (hypertension)    Lyme disease    Malignant neoplasm of breast (female)  lymph nodes removed and lumpectomy (left) 2015  treat with radiation  Myocardial infarct, old  2001  Obesity    JAIMIE on CPAP

## 2020-02-20 NOTE — ED ADULT NURSE REASSESSMENT NOTE - NS ED NURSE REASSESS COMMENT FT1
Received report from Shaneka Rodriguez RN. Pt A&OX3, vitals stable. Pt eating dinner at this time, c/o constant dull chest pain. Pt aware of plan of care, safety maintained & will continue to monitor.

## 2020-02-20 NOTE — H&P ADULT - NSHPPHYSICALEXAM_GEN_ALL_CORE
PHYSICAL EXAM:  GENERAL: NAD, well-groomed, well-developed  HEAD:  Atraumatic, Normocephalic  EYES: EOMI, PERRLA, conjunctiva and sclera clear  ENMT: Moist mucous membranes, Good dentition  NECK: Supple, No JVD  NERVOUS SYSTEM:  A/O x3, Good concentration; CN 2-12 intact, No focal deficits  CHEST/LUNG: Clear to auscultation bilaterally; No rales, rhonchi, wheezing, or rubs  HEART: Regular rate and rhythm; S1/S2, No murmurs, rubs, or gallops  ABDOMEN: Soft, Nontender, Nondistended; Bowel sounds present  VASCULAR: Normal pulses, Normal capillary refill  EXTREMITIES:  2+ Peripheral Pulses, No clubbing, cyanosis, +2 B/L LE edema   SKIN: Warm, Intact  PSYCH: Normal mood and affect

## 2020-02-20 NOTE — ED ADULT NURSE NOTE - PSH
Blunt trauma  right shoulder injury with hardware, limited ROM  H/O lumpectomy  lymph nodes removed and lumpectomy (left) 2015  History of hysterectomy  partial  History of knee problem  right total knee replacement  S/P itzel    Status post tonsillectomy

## 2020-02-20 NOTE — H&P ADULT - NSICDXPASTSURGICALHX_GEN_ALL_CORE_FT
PAST SURGICAL HISTORY:  Blunt trauma right shoulder injury with hardware, limited ROM    H/O lumpectomy lymph nodes removed and lumpectomy (left) 2015    History of hysterectomy partial    History of knee problem right total knee replacement    S/P itzel     Status post tonsillectomy

## 2020-02-20 NOTE — ED PROVIDER NOTE - OBJECTIVE STATEMENT
Pt is 75 y/o female with Pmhx of DM, breast ca s/p lumpectomy and radiation, fibromyalgia, Leiden V deficiency here for eval of midsternal CP with Pt is 75 y/o female with Pmhx of DM, CAD x 3 stents, breast ca s/p lumpectomy and radiation, fibromyalgia, Leiden V deficiency here for eval of midsternal CP with radiation to Rt side of chest x 2 days. Pain is sharp, nonexertional, not associated with SOB, jaw pain, diaphoresis, palpitations, nausea, vomiting, abd pain. Pt denies HA, dizziness, denies leg pain/edema, recent travel or prolonged immobilizations.  cardio - Dr tomlin; Pt admits that she has been doing new exercise regimen as part of [email or web address] program;

## 2020-02-20 NOTE — ED PROVIDER NOTE - CLINICAL SUMMARY MEDICAL DECISION MAKING FREE TEXT BOX
Pt is 73 y/o female with Pmhx of DM, CAD x 3 stents, breast ca s/p lumpectomy and radiation, fibromyalgia, Leiden V deficiency here for eval of midsternal CP with radiation to Rt side of chest x 2 days. Pain is sharp, nonexertional, not associated with SOB, jaw pain, diaphoresis, palpitations, nausea, vomiting, abd pain. Pt denies HA, dizziness, denies leg pain/edema, recent travel or prolonged immobilizations.  cardio - Dr tomlin; Pt admits that she has been doing new exercise regimen as part of [employer] sne[employer] program; CXR pending, will f/u on labs, if INR subtherapeutic will get CTA chest;

## 2020-02-20 NOTE — H&P ADULT - NSHPLABSRESULTS_GEN_ALL_CORE
Daily Height in cm: 157.48 (20 Feb 2020 14:20)    Daily                           12.3   7.41  )-----------( 202      ( 20 Feb 2020 13:25 )             36.6       02-20    140  |  103  |  26<H>  ----------------------------<  102<H>  4.2   |  27  |  0.86    Ca    8.8      20 Feb 2020 13:25    TPro  8.1  /  Alb  3.8  /  TBili  0.5  /  DBili  x   /  AST  25  /  ALT  30  /  AlkPhos  43  02-20      CARDIAC MARKERS ( 20 Feb 2020 13:25 )  <.017 ng/mL / x     / x     / x     / x            CAPILLARY BLOOD GLUCOSE      POCT Blood Glucose.: 83 mg/dL (20 Feb 2020 18:45)      LIVER FUNCTIONS - ( 20 Feb 2020 13:25 )  Alb: 3.8 g/dL / Pro: 8.1 g/dL / ALK PHOS: 43 U/L / ALT: 30 U/L / AST: 25 U/L / GGT: x                 PT/INR - ( 20 Feb 2020 13:25 )   PT: 18.1 sec;   INR: 1.59 ratio         PTT - ( 20 Feb 2020 13:25 )  PTT:34.8 sec    Radiology: < from: Xray Chest 2 Views PA/Lat (02.20.20 @ 15:48) >    EXAM:  XR CHEST PA LAT 2V      PROCEDURE DATE:  02/20/2020        INTERPRETATION:  PA and lateral chest on February 20, 2020 3:22 PM. Patient has chest pain.    COMPARISON: None available.    Heart size is within normal limits.    The lung fields and pleural surfaces are unremarkable.    Orthopedic hardware in the upper right humerus is noted and there is right shoulder degeneration.    IMPRESSION: Chest unremarkable. Old surgery right upper humerus with degeneration.    < end of copied text >

## 2020-02-21 ENCOUNTER — TRANSCRIPTION ENCOUNTER (OUTPATIENT)
Age: 75
End: 2020-02-21

## 2020-02-21 VITALS — SYSTOLIC BLOOD PRESSURE: 153 MMHG | RESPIRATION RATE: 8 BRPM | DIASTOLIC BLOOD PRESSURE: 57 MMHG

## 2020-02-21 LAB
ALBUMIN SERPL ELPH-MCNC: 3.5 G/DL — SIGNIFICANT CHANGE UP (ref 3.3–5)
ALP SERPL-CCNC: 40 U/L — SIGNIFICANT CHANGE UP (ref 40–120)
ALT FLD-CCNC: 32 U/L — SIGNIFICANT CHANGE UP (ref 10–45)
ANION GAP SERPL CALC-SCNC: 10 MMOL/L — SIGNIFICANT CHANGE UP (ref 5–17)
AST SERPL-CCNC: 26 U/L — SIGNIFICANT CHANGE UP (ref 10–40)
BILIRUB SERPL-MCNC: 0.5 MG/DL — SIGNIFICANT CHANGE UP (ref 0.2–1.2)
BUN SERPL-MCNC: 27 MG/DL — HIGH (ref 7–23)
CALCIUM SERPL-MCNC: 9 MG/DL — SIGNIFICANT CHANGE UP (ref 8.4–10.5)
CHLORIDE SERPL-SCNC: 104 MMOL/L — SIGNIFICANT CHANGE UP (ref 96–108)
CO2 SERPL-SCNC: 28 MMOL/L — SIGNIFICANT CHANGE UP (ref 22–31)
CREAT SERPL-MCNC: 0.85 MG/DL — SIGNIFICANT CHANGE UP (ref 0.5–1.3)
GLUCOSE BLDC GLUCOMTR-MCNC: 170 MG/DL — HIGH (ref 70–99)
GLUCOSE BLDC GLUCOMTR-MCNC: 172 MG/DL — HIGH (ref 70–99)
GLUCOSE SERPL-MCNC: 130 MG/DL — HIGH (ref 70–99)
HBA1C BLD-MCNC: 7.5 % — HIGH (ref 4–5.6)
HCT VFR BLD CALC: 36.1 % — SIGNIFICANT CHANGE UP (ref 34.5–45)
HCV AB S/CO SERPL IA: 0.2 S/CO — SIGNIFICANT CHANGE UP (ref 0–0.99)
HCV AB SERPL-IMP: SIGNIFICANT CHANGE UP
HGB BLD-MCNC: 11.9 G/DL — SIGNIFICANT CHANGE UP (ref 11.5–15.5)
INR BLD: 1.58 RATIO — HIGH (ref 0.88–1.16)
MCHC RBC-ENTMCNC: 29.2 PG — SIGNIFICANT CHANGE UP (ref 27–34)
MCHC RBC-ENTMCNC: 33 GM/DL — SIGNIFICANT CHANGE UP (ref 32–36)
MCV RBC AUTO: 88.7 FL — SIGNIFICANT CHANGE UP (ref 80–100)
NRBC # BLD: 0 /100 WBCS — SIGNIFICANT CHANGE UP (ref 0–0)
PLATELET # BLD AUTO: 202 K/UL — SIGNIFICANT CHANGE UP (ref 150–400)
POTASSIUM SERPL-MCNC: 4.2 MMOL/L — SIGNIFICANT CHANGE UP (ref 3.5–5.3)
POTASSIUM SERPL-SCNC: 4.2 MMOL/L — SIGNIFICANT CHANGE UP (ref 3.5–5.3)
PROT SERPL-MCNC: 7.9 G/DL — SIGNIFICANT CHANGE UP (ref 6–8.3)
PROTHROM AB SERPL-ACNC: 18 SEC — HIGH (ref 10–12.9)
RBC # BLD: 4.07 M/UL — SIGNIFICANT CHANGE UP (ref 3.8–5.2)
RBC # FLD: 12.9 % — SIGNIFICANT CHANGE UP (ref 10.3–14.5)
SODIUM SERPL-SCNC: 142 MMOL/L — SIGNIFICANT CHANGE UP (ref 135–145)
TROPONIN I SERPL-MCNC: <.017 NG/ML — LOW (ref 0.02–0.06)
WBC # BLD: 6.56 K/UL — SIGNIFICANT CHANGE UP (ref 3.8–10.5)
WBC # FLD AUTO: 6.56 K/UL — SIGNIFICANT CHANGE UP (ref 3.8–10.5)

## 2020-02-21 PROCEDURE — 94660 CPAP INITIATION&MGMT: CPT

## 2020-02-21 PROCEDURE — 99239 HOSP IP/OBS DSCHRG MGMT >30: CPT

## 2020-02-21 PROCEDURE — 93306 TTE W/DOPPLER COMPLETE: CPT

## 2020-02-21 PROCEDURE — 99222 1ST HOSP IP/OBS MODERATE 55: CPT

## 2020-02-21 PROCEDURE — 85610 PROTHROMBIN TIME: CPT

## 2020-02-21 PROCEDURE — 71046 X-RAY EXAM CHEST 2 VIEWS: CPT

## 2020-02-21 PROCEDURE — 93010 ELECTROCARDIOGRAM REPORT: CPT

## 2020-02-21 PROCEDURE — 85379 FIBRIN DEGRADATION QUANT: CPT

## 2020-02-21 PROCEDURE — 85027 COMPLETE CBC AUTOMATED: CPT

## 2020-02-21 PROCEDURE — 99285 EMERGENCY DEPT VISIT HI MDM: CPT | Mod: 25

## 2020-02-21 PROCEDURE — 78452 HT MUSCLE IMAGE SPECT MULT: CPT

## 2020-02-21 PROCEDURE — 84484 ASSAY OF TROPONIN QUANT: CPT

## 2020-02-21 PROCEDURE — 78582 LUNG VENTILAT&PERFUS IMAGING: CPT | Mod: 26

## 2020-02-21 PROCEDURE — 83036 HEMOGLOBIN GLYCOSYLATED A1C: CPT

## 2020-02-21 PROCEDURE — A9567: CPT

## 2020-02-21 PROCEDURE — 93005 ELECTROCARDIOGRAM TRACING: CPT

## 2020-02-21 PROCEDURE — 86803 HEPATITIS C AB TEST: CPT

## 2020-02-21 PROCEDURE — 85730 THROMBOPLASTIN TIME PARTIAL: CPT

## 2020-02-21 PROCEDURE — 36415 COLL VENOUS BLD VENIPUNCTURE: CPT

## 2020-02-21 PROCEDURE — 36000 PLACE NEEDLE IN VEIN: CPT

## 2020-02-21 PROCEDURE — 78582 LUNG VENTILAT&PERFUS IMAGING: CPT

## 2020-02-21 PROCEDURE — 93306 TTE W/DOPPLER COMPLETE: CPT | Mod: 26

## 2020-02-21 PROCEDURE — 80053 COMPREHEN METABOLIC PANEL: CPT

## 2020-02-21 PROCEDURE — A9540: CPT

## 2020-02-21 PROCEDURE — 82962 GLUCOSE BLOOD TEST: CPT

## 2020-02-21 RX ORDER — WARFARIN SODIUM 2.5 MG/1
1 TABLET ORAL
Qty: 0 | Refills: 0 | DISCHARGE

## 2020-02-21 RX ORDER — FUROSEMIDE 40 MG
1 TABLET ORAL
Qty: 0 | Refills: 0 | DISCHARGE

## 2020-02-21 RX ORDER — METFORMIN HYDROCHLORIDE 850 MG/1
1 TABLET ORAL
Qty: 0 | Refills: 0 | DISCHARGE

## 2020-02-21 RX ORDER — WARFARIN SODIUM 2.5 MG/1
10 TABLET ORAL ONCE
Refills: 0 | Status: DISCONTINUED | OUTPATIENT
Start: 2020-02-21 | End: 2020-02-21

## 2020-02-21 RX ORDER — WARFARIN SODIUM 2.5 MG/1
1 TABLET ORAL
Qty: 0 | Refills: 0 | DISCHARGE
Start: 2020-02-21

## 2020-02-21 RX ORDER — WARFARIN SODIUM 2.5 MG/1
7.5 TABLET ORAL ONCE
Refills: 0 | Status: DISCONTINUED | OUTPATIENT
Start: 2020-02-21 | End: 2020-02-21

## 2020-02-21 RX ORDER — FUROSEMIDE 40 MG
1 TABLET ORAL
Qty: 0 | Refills: 0 | DISCHARGE
Start: 2020-02-21

## 2020-02-21 RX ADMIN — AMLODIPINE BESYLATE 5 MILLIGRAM(S): 2.5 TABLET ORAL at 05:31

## 2020-02-21 RX ADMIN — Medication 1: at 07:06

## 2020-02-21 RX ADMIN — CARVEDILOL PHOSPHATE 12.5 MILLIGRAM(S): 80 CAPSULE, EXTENDED RELEASE ORAL at 05:31

## 2020-02-21 RX ADMIN — Medication 30 MILLILITER(S): at 11:38

## 2020-02-21 RX ADMIN — Medication 1: at 11:59

## 2020-02-21 RX ADMIN — Medication 0.5 MILLIGRAM(S): at 13:58

## 2020-02-21 RX ADMIN — PANTOPRAZOLE SODIUM 40 MILLIGRAM(S): 20 TABLET, DELAYED RELEASE ORAL at 05:33

## 2020-02-21 RX ADMIN — ZINC SULFATE TAB 220 MG (50 MG ZINC EQUIVALENT) 220 MILLIGRAM(S): 220 (50 ZN) TAB at 12:41

## 2020-02-21 RX ADMIN — Medication 20 MILLIEQUIVALENT(S): at 12:41

## 2020-02-21 RX ADMIN — Medication 40 MILLIGRAM(S): at 05:31

## 2020-02-21 RX ADMIN — HEPARIN SODIUM 5000 UNIT(S): 5000 INJECTION INTRAVENOUS; SUBCUTANEOUS at 05:32

## 2020-02-21 RX ADMIN — CLOPIDOGREL BISULFATE 75 MILLIGRAM(S): 75 TABLET, FILM COATED ORAL at 12:42

## 2020-02-21 NOTE — DISCHARGE NOTE PROVIDER - CARE PROVIDERS DIRECT ADDRESSES
[email or web address] Date Of Previous Biopsy (Optional): 4/30/2019

## 2020-02-21 NOTE — PROGRESS NOTE ADULT - ASSESSMENT
75 y/o F with PMH of CAD (stentsx3 - last on 4/2019), MI (2001),  T2DM, HTN, HLD, Factor V Leiden deficiency (on coumadin for 10 years), JAIMIE nocturnal CPAP), Fibromylagia, obesity (BMI - 41.7), breast CA s/p lumpectomy and radiation (2015), p/w dull mid sternal chest pain x 2 days. Pain is most likely nonischemic. Awaiting VQ scan to rule out PE     # Chest pain -  Suspect MSK  Ruled out for ACS with serial negative trops and nonischemic EKG (EKG with LBBB-unchanged)  May be related to gas as pt belching frequently-trial Maalox  D-Lbxdv=782, INR=1.59 - pt. admits skipping a dose of coumadin 2 days ago--OBTAIN VQ scan  Monitored on tele w no acute events   Cardio consult-Dr Flores-follow up outpatient if VQ negative     # Factor V Leiden - on coumadin x 10 years  - missed a dose of coumadin 2 days ago  INR subtherapeutic=1.58  - heparin 5000 units SQ bid until INR therapeutic  - coumadin 7.5 mg po tonight     # CAD - 3 stents, last in 4/2019  - cont. plavix, coreg, statin (therapeutical substitution) at home dose     # HTN - controlled  - cont. coreg and norvasc    # HLD - cont. statin    # JAIMIE - nocturnal CPAP - 4    # T2DM - on glimepiride and metformin at home  - HgA1C pending   - Cont ISS--will increase as pt no longer NPO   - monitor FS    # Fibromyalgia - tylenol    # DVT prophylaxis; SQH until INR therapeutic   Plan d/w Dr Flores    Dispo: home pending VQ scan

## 2020-02-21 NOTE — PATIENT PROFILE ADULT - HAVE YOU EXPERIENCED A TRAUMATIC EVENT?
Per Dr. Annita Ellison request, [facility] was called in regard to patients positive H-pylori testing. Requesting to know if patient was already treated. Spoke to Francesco Jones who stated that the patient had NOT been treated. Verbal understanding given.
Declines
no

## 2020-02-21 NOTE — DISCHARGE NOTE NURSING/CASE MANAGEMENT/SOCIAL WORK - NSDCFUADDAPPT_GEN_ALL_CORE_FT
follow up appointment with Dr Faustin on 2/25/20 at 1pm.  Please bring hospital information, insurance card and medication list.

## 2020-02-21 NOTE — PROGRESS NOTE ADULT - SUBJECTIVE AND OBJECTIVE BOX
Patient is a 74y old  Female who presents with a chief complaint of chest pain (21 Feb 2020 11:33). No acute events overnight. Reports she feels her chest discomfort is worse with movement and admits to lifting heavy items recently.  Denies any palps or SOB       Patient seen and examined at bedside.    ALLERGIES:  bee stiong alergy (Short breath)  iodine (Rash; Short breath; Hives)  penicillin (Short breath; Hives)  shellfish (Rash; Short breath; Flushing; Hives)  Sulfamethoxazole-Trimethoprim DS (Hives)    MEDICATIONS  (STANDING):  amLODIPine   Tablet 5 milliGRAM(s) Oral daily  atorvastatin 40 milliGRAM(s) Oral at bedtime  carvedilol 12.5 milliGRAM(s) Oral every 12 hours  clopidogrel Tablet 75 milliGRAM(s) Oral daily  dextrose 5%. 1000 milliLiter(s) (50 mL/Hr) IV Continuous <Continuous>  dextrose 50% Injectable 12.5 Gram(s) IV Push once  dextrose 50% Injectable 25 Gram(s) IV Push once  dextrose 50% Injectable 25 Gram(s) IV Push once  furosemide    Tablet 40 milliGRAM(s) Oral daily  heparin  Injectable 5000 Unit(s) SubCutaneous every 12 hours  insulin lispro (HumaLOG) corrective regimen sliding scale   SubCutaneous three times a day before meals  insulin lispro (HumaLOG) corrective regimen sliding scale   SubCutaneous at bedtime  LORazepam     Tablet 0.5 milliGRAM(s) Oral once  pantoprazole    Tablet 40 milliGRAM(s) Oral before breakfast  potassium chloride    Tablet ER 20 milliEquivalent(s) Oral daily  warfarin 7.5 milliGRAM(s) Oral once  zinc sulfate 220 milliGRAM(s) Oral daily    MEDICATIONS  (PRN):  acetaminophen   Tablet .. 650 milliGRAM(s) Oral every 6 hours PRN Temp greater or equal to 38C (100.4F), Mild Pain (1 - 3)  dextrose 40% Gel 15 Gram(s) Oral once PRN Blood Glucose LESS THAN 70 milliGRAM(s)/deciliter  glucagon  Injectable 1 milliGRAM(s) IntraMuscular once PRN Glucose LESS THAN 70 milligrams/deciliter    Vital Signs Last 24 Hrs  T(F): 97.5 (21 Feb 2020 08:54), Max: 98.2 (21 Feb 2020 05:08)  HR: 74 (21 Feb 2020 08:54) (62 - 81)  BP: 149/59 (21 Feb 2020 08:54) (149/59 - 186/84)  RR: 17 (21 Feb 2020 08:54) (14 - 20)  SpO2: 100% (21 Feb 2020 08:54) (97% - 100%)  I&O's Summary    21 Feb 2020 07:01  -  21 Feb 2020 11:47  --------------------------------------------------------  IN: 0 mL / OUT: 0 mL / NET: 0 mL      BMI (kg/m2): 41.7 (02-21-20 @ 00:52)  PHYSICAL EXAM:  General: NAD, A/O x 3  ENT: MMM  Neck: Supple, No JVD  Lungs: Clear to auscultation bilaterally  Cardio: RRR, S1/S2, + systolic murmur , no pitting edema bilaterally  Abdomen: Soft, Nontender, Nondistended; Bowel sounds present  Extremities: No calf tenderness, moves all extremities     LABS:                        11.9   6.56  )-----------( 202      ( 21 Feb 2020 05:30 )             36.1       02-21    142  |  104  |  27  ----------------------------<  130  4.2   |  28  |  0.85    Ca    9.0      21 Feb 2020 05:30    TPro  7.9  /  Alb  3.5  /  TBili  0.5  /  DBili  x   /  AST  26  /  ALT  32  /  AlkPhos  40  02-21     eGFR if Non African American: 68 mL/min/1.73M2 (02-21-20 @ 05:30)  eGFR if : 79 mL/min/1.73M2 (02-21-20 @ 05:30)    PT/INR - ( 21 Feb 2020 05:30 )   PT: 18.0 sec;   INR: 1.58 ratio         PTT - ( 20 Feb 2020 13:25 )  PTT:34.8 sec     CARDIAC MARKERS ( 21 Feb 2020 05:30 )  <.017 ng/mL / x     / x     / x     / x      CARDIAC MARKERS ( 20 Feb 2020 22:05 )  <.017 ng/mL / x     / x     / x     / x      CARDIAC MARKERS ( 20 Feb 2020 13:25 )  <.017 ng/mL / x     / x     / x     / x                        POCT Blood Glucose.: 172 mg/dL (21 Feb 2020 11:33)  POCT Blood Glucose.: 170 mg/dL (21 Feb 2020 07:03)  POCT Blood Glucose.: 141 mg/dL (20 Feb 2020 22:04)  POCT Blood Glucose.: 83 mg/dL (20 Feb 2020 18:45)            RADIOLOGY & ADDITIONAL TESTS:    Care Discussed with Consultants/Other Providers: Patient is a 74y old  Female who presents with a chief complaint of chest pain (21 Feb 2020 11:33). No acute events overnight. Reports she feels her chest discomfort is worse with movement and admits to lifting heavy items recently.  Denies any palps or SOB       Patient seen and examined at bedside.    ALLERGIES:  bee stiong alergy (Short breath)  iodine (Rash; Short breath; Hives)  penicillin (Short breath; Hives)  shellfish (Rash; Short breath; Flushing; Hives)  Sulfamethoxazole-Trimethoprim DS (Hives)    MEDICATIONS  (STANDING):  amLODIPine   Tablet 5 milliGRAM(s) Oral daily  atorvastatin 40 milliGRAM(s) Oral at bedtime  carvedilol 12.5 milliGRAM(s) Oral every 12 hours  clopidogrel Tablet 75 milliGRAM(s) Oral daily  dextrose 5%. 1000 milliLiter(s) (50 mL/Hr) IV Continuous <Continuous>  dextrose 50% Injectable 12.5 Gram(s) IV Push once  dextrose 50% Injectable 25 Gram(s) IV Push once  dextrose 50% Injectable 25 Gram(s) IV Push once  furosemide    Tablet 40 milliGRAM(s) Oral daily  heparin  Injectable 5000 Unit(s) SubCutaneous every 12 hours  insulin lispro (HumaLOG) corrective regimen sliding scale   SubCutaneous three times a day before meals  insulin lispro (HumaLOG) corrective regimen sliding scale   SubCutaneous at bedtime  LORazepam     Tablet 0.5 milliGRAM(s) Oral once  pantoprazole    Tablet 40 milliGRAM(s) Oral before breakfast  potassium chloride    Tablet ER 20 milliEquivalent(s) Oral daily  warfarin 7.5 milliGRAM(s) Oral once  zinc sulfate 220 milliGRAM(s) Oral daily    MEDICATIONS  (PRN):  acetaminophen   Tablet .. 650 milliGRAM(s) Oral every 6 hours PRN Temp greater or equal to 38C (100.4F), Mild Pain (1 - 3)  dextrose 40% Gel 15 Gram(s) Oral once PRN Blood Glucose LESS THAN 70 milliGRAM(s)/deciliter  glucagon  Injectable 1 milliGRAM(s) IntraMuscular once PRN Glucose LESS THAN 70 milligrams/deciliter    Vital Signs Last 24 Hrs  T(F): 97.5 (21 Feb 2020 08:54), Max: 98.2 (21 Feb 2020 05:08)  HR: 74 (21 Feb 2020 08:54) (62 - 81)  BP: 149/59 (21 Feb 2020 08:54) (149/59 - 186/84)  RR: 17 (21 Feb 2020 08:54) (14 - 20)  SpO2: 100% (21 Feb 2020 08:54) (97% - 100%)  I&O's Summary    21 Feb 2020 07:01  -  21 Feb 2020 11:47  --------------------------------------------------------  IN: 0 mL / OUT: 0 mL / NET: 0 mL      BMI (kg/m2): 41.7 (02-21-20 @ 00:52)  PHYSICAL EXAM:  General: NAD, A/O x 3  ENT: MMM  Neck: Supple, No JVD  Lungs: Clear to auscultation bilaterally  Cardio: RRR, S1/S2, + systolic murmur , no pitting edema bilaterally  Abdomen: Soft, Nontender, Nondistended; Bowel sounds present  Extremities: No calf tenderness, moves all extremities     LABS:                        11.9   6.56  )-----------( 202      ( 21 Feb 2020 05:30 )             36.1       02-21    142  |  104  |  27  ----------------------------<  130  4.2   |  28  |  0.85    Ca    9.0      21 Feb 2020 05:30    TPro  7.9  /  Alb  3.5  /  TBili  0.5  /  DBili  x   /  AST  26  /  ALT  32  /  AlkPhos  40  02-21     eGFR if Non African American: 68 mL/min/1.73M2 (02-21-20 @ 05:30)  eGFR if : 79 mL/min/1.73M2 (02-21-20 @ 05:30)    PT/INR - ( 21 Feb 2020 05:30 )   PT: 18.0 sec;   INR: 1.58 ratio         PTT - ( 20 Feb 2020 13:25 )  PTT:34.8 sec     CARDIAC MARKERS ( 21 Feb 2020 05:30 )  <.017 ng/mL / x     / x     / x     / x      CARDIAC MARKERS ( 20 Feb 2020 22:05 )  <.017 ng/mL / x     / x     / x     / x      CARDIAC MARKERS ( 20 Feb 2020 13:25 )  <.017 ng/mL / x     / x     / x     / x                        POCT Blood Glucose.: 172 mg/dL (21 Feb 2020 11:33)  POCT Blood Glucose.: 170 mg/dL (21 Feb 2020 07:03)  POCT Blood Glucose.: 141 mg/dL (20 Feb 2020 22:04)  POCT Blood Glucose.: 83 mg/dL (20 Feb 2020 18:45)            RADIOLOGY & ADDITIONAL TESTS:    < from: NM Pulmonary Ventilation/Perfusion Scan (02.21.20 @ 15:09) >  IMPRESSION: Normal ventilation/perfusion lung scan. No radionuclide evidence of pulmonary embolus.    < end of copied text >      Care Discussed with Consultants/Other Providers: Dr. Flores

## 2020-02-21 NOTE — DISCHARGE NOTE PROVIDER - NSDCMRMEDTOKEN_GEN_ALL_CORE_FT
amLODIPine 5 mg oral tablet: 1 tab(s) orally once a day  carvedilol 12.5 mg oral tablet: 1 tab(s) orally 2 times a day  clopidogrel 75 mg oral tablet: 1 tab(s) orally once a day  furosemide 40 mg oral tablet: 1 tab(s) orally once a day  glimepiride 2 mg oral tablet: 1 tab(s) orally once a day  metFORMIN 1000 mg oral tablet: 1 tab(s) orally 2 times a day;  potassium chloride 20 mEq oral tablet, extended release: 1 tab(s) orally once a day  Protonix 20 mg oral delayed release tablet: 1 tab(s) orally once a day  rosuvastatin 5 mg oral tablet: 1 tab(s) orally once a day  warfarin 7.5 mg oral tablet: 1 tab(s) orally once a day (in the evening) until you have repeat INR with PMD on 2/25/20  Zinc 50 mg Pink oral capsule: 1 cap(s) orally once a day

## 2020-02-21 NOTE — DISCHARGE NOTE PROVIDER - NSDCFUSCHEDAPPT_GEN_ALL_CORE_FT
NESSA LOPEZ ; 02/25/2020 ; [facility] [address]  NESSA LOPEZ ; 04/21/2020 ; [facility] [address]  NESSA LOPEZ ; 02/25/2020 ; [facility] [address]  NESSA LOPEZ ; 04/21/2020 ; [facility] [address]

## 2020-02-21 NOTE — DISCHARGE NOTE PROVIDER - NSDCFUADDAPPT_GEN_ALL_CORE_FT
follow up appointment with Dr Faustin on 2/25/20 at 1pm.  Please bring hospital information, insurance card and medication list.    Follow up with cardiology Dr Larson

## 2020-02-21 NOTE — DISCHARGE NOTE NURSING/CASE MANAGEMENT/SOCIAL WORK - PATIENT PORTAL LINK FT
You can access the FollowMyHealth Patient Portal offered by [facility] by registering at the following website: [email or web address]. By joining [facility]’s FollowMyHealth portal, you will also be able to view your health information using other applications (apps) compatible with our system.

## 2020-02-21 NOTE — CONSULT NOTE ADULT - SUBJECTIVE AND OBJECTIVE BOX
CHIEF COMPLAINT:  Patient is a 74y old  Female who presents with a chief complaint of chest pain (20 Feb 2020 18:46)    HPI:  75 y/o female with PMH of CAD (stentsx3 - last on 4/2019), MI (2001),  T2DM, HTN, HLD, Factor V Leiden defficiency (on coumadin for 10 years), JAIMIE nocturnal CPAP), Fibrromylagia, obesity (BMI - 41.7), breast CA s/p lumpectomy and radiation (2015), p/w chest pain x 2 days. Pt. states she experienced chest pain starting last night after having dinner, while cleaning the table, pain 5/10, described as dull, in the midchest more to the right, with no radiation, relieved by burping and passing gas, no aggravating factor. Pt. was able to go to sleep, however, when she woke up in the morning at 5 AM to go to the bathroom, she felt the pain again with the same intensity and descriptive factor. Pt. states she still has the same dull, midchest pain. Pt. denies any SOB, jaw pain, diaphoresis, palpitations, nausea, vomiting, abd pain. Pt denies HA, dizziness, denies leg pain/edema, recent travel or prolonged immobilizations. Pt. states she has started a new exercise program - "[facility]akers" at the [facility] in [place], after last stent in 4/2019. (20 Feb 2020 18:46)    Seen on telemetry. No current chest pain. Occurs at rest, mild right anterior chest , no radiation, dyspnea, nausea or dyspnea.      PMH:   JAIMIE on CPAP  COPD (chronic obstructive pulmonary disease)  Hernia, abdominal  History of coronary artery stent placement  Obesity  Myocardial infarct, old  Malignant neoplasm of breast (female)  DVT (deep venous thrombosis)  Lyme disease  HTN (hypertension)  Factor V Leiden  Diabetes  CAD (coronary artery disease)      PSH:   History of hysterectomy  Status post tonsillectomy  S/P itzel  History of knee problem  Blunt trauma  H/O lumpectomy        FAMILY HISTORY:  Family history of coronary artery bypass surgery (Sibling)  FH: CAD (coronary artery disease)  FH: cancer (Sibling)      SOCIAL HISTORY:  Smoking:   no  Alcohol:  no      ALLERGIES:  bee stiong alergy (Short breath)  iodine (Rash; Short breath; Hives)  penicillin (Short breath; Hives)  shellfish (Rash; Short breath; Flushing; Hives)  Sulfamethoxazole-Trimethoprim DS (Hives)      Home Medications:  amLODIPine 5 mg oral tablet: 1 tab(s) orally once a day (20 Feb 2020 15:06)  carvedilol 12.5 mg oral tablet: 1 tab(s) orally 2 times a day (20 Feb 2020 15:06)  clopidogrel 75 mg oral tablet: 1 tab(s) orally once a day (20 Feb 2020 15:06)  furosemide 40 mg oral tablet: 1 tab(s) orally once a day (20 Feb 2020 15:06)  glimepiride 2 mg oral tablet: 1 tab(s) orally once a day (20 Feb 2020 15:06)  metFORMIN 1000 mg oral tablet: 1 tab(s) orally 2 times a day; Start taking again on 5/24 in the morning   (20 Feb 2020 15:06)  potassium chloride 20 mEq oral tablet, extended release: 1 tab(s) orally once a day (20 Feb 2020 15:06)  Protonix 20 mg oral delayed release tablet: 1 tab(s) orally once a day (20 Feb 2020 18:12)  rosuvastatin 5 mg oral tablet: 1 tab(s) orally once a day (20 Feb 2020 18:12)  warfarin 5 mg oral tablet: 1 tab(s) orally once a day on Mon Wed Fri   (20 Feb 2020 15:06)  warfarin 7.5 mg oral tablet: 1 tab(s) orally once a day on Sun Tues Thurs Sat   (20 Feb 2020 15:06)  Zinc 50 mg Pink oral capsule: 1 cap(s) orally once a day (20 Feb 2020 15:06)      MEDICATIONS:  acetaminophen   Tablet .. 650 milliGRAM(s) Oral every 6 hours PRN  aluminum hydroxide/magnesium hydroxide/simethicone Suspension 30 milliLiter(s) Oral once PRN  amLODIPine   Tablet 5 milliGRAM(s) Oral daily  atorvastatin 40 milliGRAM(s) Oral at bedtime  carvedilol 12.5 milliGRAM(s) Oral every 12 hours  clopidogrel Tablet 75 milliGRAM(s) Oral daily  dextrose 40% Gel 15 Gram(s) Oral once PRN  dextrose 5%. 1000 milliLiter(s) IV Continuous <Continuous>  dextrose 50% Injectable 12.5 Gram(s) IV Push once  dextrose 50% Injectable 25 Gram(s) IV Push once  dextrose 50% Injectable 25 Gram(s) IV Push once  furosemide    Tablet 40 milliGRAM(s) Oral daily  glucagon  Injectable 1 milliGRAM(s) IntraMuscular once PRN  heparin  Injectable 5000 Unit(s) SubCutaneous every 12 hours  insulin lispro (HumaLOG) corrective regimen sliding scale   SubCutaneous three times a day before meals  insulin lispro (HumaLOG) corrective regimen sliding scale   SubCutaneous at bedtime  pantoprazole    Tablet 40 milliGRAM(s) Oral before breakfast  potassium chloride    Tablet ER 20 milliEquivalent(s) Oral daily  warfarin 7.5 milliGRAM(s) Oral once  zinc sulfate 220 milliGRAM(s) Oral daily      REVIEW OF SYSTEMS:  CONSTITUTIONAL: No fever, weight loss, or fatigue  EYES: No eye pain, visual disturbances, or discharge  ENMT:  No difficulty hearing, tinnitus, vertigo; No sinus or throat pain  NECK: No pain or stiffness  BREASTS: No pain, masses, or nipple discharge  RESPIRATORY: No cough, wheezing, chills or hemoptysis; No shortness of breath  CARDIOVASCULAR: see HPI  GASTROINTESTINAL: No abdominal or epigastric pain. No nausea, vomiting, or hematemesis; No diarrhea or constipation. No melena or hematochezia.  GENITOURINARY: No dysuria, frequency, hematuria, or incontinence  NEUROLOGICAL: No headaches, memory loss, loss of strength, numbness, or tremors  SKIN: No itching, burning, rashes, or lesions   LYMPH NODES: No enlarged glands  ENDOCRINE: No heat or cold intolerance; No hair loss  MUSCULOSKELETAL:  Multiple somatic aches/pains  PSYCHIATRIC: No depression, anxiety, mood swings, or difficulty sleeping  HEME/LYMPH: No easy bruising, or bleeding gums  ALLERGY AND IMMUNOLOGIC: No hives or eczema    PHYSICAL EXAM:  T(C): 36.4 (02-21-20 @ 08:54), Max: 36.8 (02-21-20 @ 05:08)  HR: 74 (02-21-20 @ 08:54) (62 - 81)  BP: 149/59 (02-21-20 @ 08:54) (149/59 - 186/84)  RR: 17 (02-21-20 @ 08:54) (14 - 20)  SpO2: 100% (02-21-20 @ 08:54) (97% - 100%)  Wt(kg): --    GENERAL: NAD, well-groomed, well-developed  HEAD:  Atraumatic, Normocephalic  EYES: EOMI, conjunctiva and sclera clear  ENT: Moist mucous membranes,  NECK: Supple, No JVD, no bruits  CHEST/LUNG: Clear to ausculation and percussion bilaterally; No rales, rhonchi, wheezing, or rubs  slight discomfort in right anterior chest on palpation  HEART: Regular rate and rhythm; No murmurs, rubs, or gallops PMI non displaced.  ABDOMEN: Soft, Nontender, Nondistended; Bowel sounds present  EXTREMITIES:   No clubbing, cyanosis, or edema  SKIN: No rashes or lesions  NERVOUS SYSTEM:  Alert No focal deficits    Cardiovascular Diagnostic Testing:  ECG:    sinus lbbb    LABS:                        11.9   6.56  )-----------( 202      ( 21 Feb 2020 05:30 )             36.1     02-21    142  |  104  |  27<H>  ----------------------------<  130<H>  4.2   |  28  |  0.85    Ca    9.0      21 Feb 2020 05:30    TPro  7.9  /  Alb  3.5  /  TBili  0.5  /  DBili  x   /  AST  26  /  ALT  32  /  AlkPhos  40  02-21    PT/INR - ( 21 Feb 2020 05:30 )   PT: 18.0 sec;   INR: 1.58 ratio         PTT - ( 20 Feb 2020 13:25 )  PTT:34.8 sec  CARDIAC MARKERS ( 21 Feb 2020 05:30 )  <.017 ng/mL / x     / x     / x     / x      CARDIAC MARKERS ( 20 Feb 2020 22:05 )  <.017 ng/mL / x     / x     / x     / x      CARDIAC MARKERS ( 20 Feb 2020 13:25 )  <.017 ng/mL / x     / x     / x     / x            Telemetry:  sinus    IMAGING:    < from: Xray Chest 2 Views PA/Lat (02.20.20 @ 15:48) >    EXAM:  XR CHEST PA LAT 2V      PROCEDURE DATE:  02/20/2020        INTERPRETATION:  PA and lateral chest on February 20, 2020 3:22 PM. Patient has chest pain.    COMPARISON: None available.    Heart size is within normal limits.    The lung fields and pleural surfaces are unremarkable.    Orthopedic hardware in the upper right humerus is noted and there is right shoulder degeneration.    IMPRESSION: Chest unremarkable. Old surgery right upper humerus with degeneration.                  RENITA FERRARA M.D., ATTENDING RADIOLOGIST  This document has been electronically signed. Feb 20 2020  3:55PM        < end of copied text >

## 2020-02-21 NOTE — DISCHARGE NOTE PROVIDER - CARE PROVIDER_API CALL
Levi Larson)  Cardiology  [address], [address]  [place] [address]  Phone: [phone]  Fax: [phone]  Follow Up Time:

## 2020-02-21 NOTE — PATIENT PROFILE ADULT - BRADEN MOISTURE
(3) occasionally moist Bcc Pigmented Histology Text: There were aggregates of atypical basaloid cells.

## 2020-02-21 NOTE — PROGRESS NOTE ADULT - ATTENDING COMMENTS
I have personally seen and examined patient on the above date.  I discussed the case with FANNIE Jackson and I agree with findings and plan as detailed per note above, which I have amended where appropriate.    Appreciate cardiology evaluation  Negative markers for ACS  v/q scan unremarkable  INR subtherapeutic, otherwise medically stable for d/c  pt will be educated to take higher dose of INR for a couple of days and have INR tested within 3 days upon discharge

## 2020-02-21 NOTE — CONSULT NOTE ADULT - ASSESSMENT
chest pain, probably non ischemic in patient with cad history , prior stents, chronic lbbb  chronic anticoag status  markers negative for ACS      D/w patient  v/q pending  if negative would discharge for outpatient managment and follow up

## 2020-02-21 NOTE — DISCHARGE NOTE PROVIDER - HOSPITAL COURSE
Hospital Course    73 y/o F with PMH of CAD (stentsx3 - last on 4/2019), MI (2001),  T2DM, HTN, HLD, Factor V Leiden deficiency (on coumadin for 10 years), JAIMIE nocturnal CPAP), Fibromylagia, obesity (BMI - 41.7), breast CA s/p lumpectomy and radiation (2015), p/w dull mid sternal chest pain x 2 days. Pain is most likely nonischemic. Ruled out for ACS with serial negative trops and nonischemic EKG (EKG with LBBB-unchanged). TTE with EF 50-55%, nml LV fxn. D Dimer mildly elevated. Underwent VQ scan which was low probability for PE     Medically stable for discharge home with outpatient follow up    She should have repeat INR at her appt on Tues, 2/25 with Dr Faustin. She will continue coumadin 7.5 mg daily         Source of Infection:    Antibiotic / Last Day:N/A        Palliative Care / Advanced Care Planning    Code Status:FULL    Patient/Family agreeable to Hospice/Palliative (Y/N)?N    Summary of Goals of Care Conversation:FULL CODE        Discharging Provider:  DHAVAL Meyer    Contact Info: Cell [phone] - Please call with any questions or concerns.        Outpatient Provider: Dr Faustin            **RESULTS**    < from: NM Pulmonary Ventilation/Perfusion Scan (02.21.20 @ 15:09) >        IMPRESSION: Normal ventilation/perfusion lung scan. No radionuclide evidence of pulmonary embolus.            < end of copied text >        < from: TTE Echo Complete w/Doppler (02.21.20 @ 10:22) >        Summary:     1. Left ventricular ejection fraction, by visual estimation, is 50 to 55%.     2. Normal global left ventricular systolic function.     3. Spectral Doppler shows impaired relaxation pattern of left ventricular myocardial filling (Grade I diastolic dysfunction).     4. There is no evidence of pericardial effusion.     5. Mild mitral annular calcification.     6. Thickening and calcification of the anterior and posterior mitral valve leaflets.     7. Mild to moderate aortic valve stenosis.     8. Aortic valve is calcified.        < end of copied text > Hospital Course    73 y/o F with PMH of CAD (stentsx3 - last on 4/2019), MI (2001),  T2DM, HTN, HLD, Factor V Leiden deficiency (on coumadin for 10 years), JAIMIE nocturnal CPAP), Fibromylagia, obesity (BMI - 41.7), breast CA s/p lumpectomy and radiation (2015), p/w dull mid sternal chest pain x 2 days. Pain is most likely nonischemic. Ruled out for ACS with serial negative trops and nonischemic EKG (EKG with LBBB-unchanged). TTE with EF 50-55%, nml LV fxn. D Dimer mildly elevated. Underwent VQ scan which was low probability for PE     Medically stable for discharge home with outpatient follow up    She should have repeat INR at her appt on Tues, 2/25 with Dr Faustin. Pt was advised to take higher dose of coumadin for a couple of days but pt concerned that her INR really goes high on that dose .She will continue coumadin 7.5 mg daily and follow up with PMD        Source of Infection:    Antibiotic / Last Day:N/A        Palliative Care / Advanced Care Planning    Code Status:FULL    Patient/Family agreeable to Hospice/Palliative (Y/N)?N    Summary of Goals of Care Conversation:FULL CODE        Discharging Provider:  DHAVAL Meyer    Contact Info: Cell [phone] - Please call with any questions or concerns.        Outpatient Provider: Dr Faustin            **RESULTS**    < from: NM Pulmonary Ventilation/Perfusion Scan (02.21.20 @ 15:09) >        IMPRESSION: Normal ventilation/perfusion lung scan. No radionuclide evidence of pulmonary embolus.            < end of copied text >        < from: TTE Echo Complete w/Doppler (02.21.20 @ 10:22) >        Summary:     1. Left ventricular ejection fraction, by visual estimation, is 50 to 55%.     2. Normal global left ventricular systolic function.     3. Spectral Doppler shows impaired relaxation pattern of left ventricular myocardial filling (Grade I diastolic dysfunction).     4. There is no evidence of pericardial effusion.     5. Mild mitral annular calcification.     6. Thickening and calcification of the anterior and posterior mitral valve leaflets.     7. Mild to moderate aortic valve stenosis.     8. Aortic valve is calcified.        < end of copied text >            time spent on discharge >32 minutes

## 2020-02-25 ENCOUNTER — APPOINTMENT (OUTPATIENT)
Age: 75
End: 2020-02-25

## 2020-03-06 ENCOUNTER — NON-APPOINTMENT (OUTPATIENT)
Age: 75
End: 2020-03-06

## 2020-03-06 ENCOUNTER — APPOINTMENT (OUTPATIENT)
Dept: CARDIOLOGY | Facility: CLINIC | Age: 75
End: 2020-03-06
Payer: MEDICARE

## 2020-03-06 VITALS
SYSTOLIC BLOOD PRESSURE: 144 MMHG | HEART RATE: 75 BPM | BODY MASS INDEX: 42.33 KG/M2 | RESPIRATION RATE: 17 BRPM | HEIGHT: 62 IN | DIASTOLIC BLOOD PRESSURE: 76 MMHG | WEIGHT: 230 LBS | OXYGEN SATURATION: 99 %

## 2020-03-06 PROBLEM — Z95.5 PRESENCE OF CORONARY ANGIOPLASTY IMPLANT AND GRAFT: Chronic | Status: ACTIVE | Noted: 2019-05-17

## 2020-03-06 PROCEDURE — 93000 ELECTROCARDIOGRAM COMPLETE: CPT

## 2020-03-06 PROCEDURE — 99215 OFFICE O/P EST HI 40 MIN: CPT

## 2020-03-12 DIAGNOSIS — E87.6 HYPOKALEMIA: ICD-10-CM

## 2020-03-12 RX ORDER — POTASSIUM CHLORIDE 20 MEQ/1
TABLET, EXTENDED RELEASE ORAL
Qty: 180 TABLET | Refills: 0 | Status: SHIPPED | OUTPATIENT
Start: 2020-03-12 | End: 2020-05-29

## 2020-04-10 LAB — INR PPP: 2

## 2020-05-29 ENCOUNTER — RX RENEWAL (OUTPATIENT)
Age: 75
End: 2020-05-29

## 2020-05-29 DIAGNOSIS — E87.6 HYPOKALEMIA: ICD-10-CM

## 2020-05-29 RX ORDER — POTASSIUM CHLORIDE 20 MEQ/1
TABLET, EXTENDED RELEASE ORAL
Qty: 180 TABLET | Refills: 0 | Status: SHIPPED | OUTPATIENT
Start: 2020-05-29 | End: 2021-03-15

## 2020-06-21 ENCOUNTER — RX RENEWAL (OUTPATIENT)
Age: 75
End: 2020-06-21

## 2020-07-17 ENCOUNTER — NON-APPOINTMENT (OUTPATIENT)
Age: 75
End: 2020-07-17

## 2020-07-17 ENCOUNTER — APPOINTMENT (OUTPATIENT)
Dept: FAMILY MEDICINE | Facility: CLINIC | Age: 75
End: 2020-07-17
Payer: MEDICARE

## 2020-07-17 ENCOUNTER — APPOINTMENT (OUTPATIENT)
Dept: CARDIOLOGY | Facility: CLINIC | Age: 75
End: 2020-07-17
Payer: MEDICARE

## 2020-07-17 VITALS
OXYGEN SATURATION: 99 % | TEMPERATURE: 97.4 F | HEIGHT: 62 IN | WEIGHT: 240 LBS | HEART RATE: 84 BPM | SYSTOLIC BLOOD PRESSURE: 149 MMHG | DIASTOLIC BLOOD PRESSURE: 79 MMHG | RESPIRATION RATE: 20 BRPM | BODY MASS INDEX: 44.16 KG/M2

## 2020-07-17 VITALS
BODY MASS INDEX: 44.16 KG/M2 | HEART RATE: 73 BPM | OXYGEN SATURATION: 98 % | SYSTOLIC BLOOD PRESSURE: 176 MMHG | HEIGHT: 62 IN | RESPIRATION RATE: 14 BRPM | DIASTOLIC BLOOD PRESSURE: 90 MMHG | WEIGHT: 240 LBS | TEMPERATURE: 97.3 F

## 2020-07-17 DIAGNOSIS — Z87.39 PERSONAL HISTORY OF OTHER DISEASES OF THE MUSCULOSKELETAL SYSTEM AND CONNECTIVE TISSUE: ICD-10-CM

## 2020-07-17 DIAGNOSIS — Z86.19 PERSONAL HISTORY OF OTHER INFECTIOUS AND PARASITIC DISEASES: ICD-10-CM

## 2020-07-17 PROCEDURE — 99215 OFFICE O/P EST HI 40 MIN: CPT

## 2020-07-17 PROCEDURE — 93000 ELECTROCARDIOGRAM COMPLETE: CPT

## 2020-07-17 PROCEDURE — 99205 OFFICE O/P NEW HI 60 MIN: CPT

## 2020-07-17 NOTE — HEALTH RISK ASSESSMENT
[No] : In the past 12 months have you used drugs other than those required for medical reasons? No [No falls in past year] : Patient reported no falls in the past year [0] : 1) Little interest or pleasure doing things: Not at all (0) [] : No [de-identified] : limited to knee arthritis [de-identified] : trying to follow diabetic [[id number]] : 0

## 2020-07-17 NOTE — PLAN
[FreeTextEntry1] : anticoagulated on Coumadin.\par  D.M-2: diet compliance. continue current meds. \par HTN:stable\par Obesity:\par aortic stenosis, CAD: f/u cardiology. \par GERD: PPI.\par  umbilical hernia: surgery referral.

## 2020-07-17 NOTE — COUNSELING
[Behavioral health counseling provided] : Behavioral health counseling provided [Engage in a relaxing activity] : Engage in a relaxing activity [None] : None [Benefits of weight loss discussed] : Benefits of weight loss discussed [Encouraged to increase physical activity] : Encouraged to increase physical activity [Encouraged to maintain food diary] : Encouraged to maintain food diary [Encouraged to use exercise tracking device] : Encouraged to use exercise tracking device [Decrease Portions] : decrease portions [Weigh Self Weekly] : weigh self weekly [Keep Food Diary] : keep food diary [Needs reinforcement, provided] : Patient needs reinforcement on understanding of disease, goals and obesity follow-up plan; reinforcement was provided

## 2020-07-17 NOTE — PHYSICAL EXAM
[No Acute Distress] : no acute distress [Well Nourished] : well nourished [Well Developed] : well developed [Well-Appearing] : well-appearing [Normal Sclera/Conjunctiva] : normal sclera/conjunctiva [EOMI] : extraocular movements intact [Normal Outer Ear/Nose] : the outer ears and nose were normal in appearance [PERRL] : pupils equal round and reactive to light [No Lymphadenopathy] : no lymphadenopathy [No JVD] : no jugular venous distention [Normal Oropharynx] : the oropharynx was normal [Thyroid Normal, No Nodules] : the thyroid was normal and there were no nodules present [No Respiratory Distress] : no respiratory distress  [Supple] : supple [Clear to Auscultation] : lungs were clear to auscultation bilaterally [Normal Rate] : normal rate  [No Accessory Muscle Use] : no accessory muscle use [Normal S1, S2] : normal S1 and S2 [Regular Rhythm] : with a regular rhythm [No Murmur] : no murmur heard [No Carotid Bruits] : no carotid bruits [No Abdominal Bruit] : a ~M bruit was not heard ~T in the abdomen [Pedal Pulses Present] : the pedal pulses are present [No Edema] : there was no peripheral edema [No Varicosities] : no varicosities [No Extremity Clubbing/Cyanosis] : no extremity clubbing/cyanosis [No Palpable Aorta] : no palpable aorta [Non Tender] : non-tender [Soft] : abdomen soft [No Masses] : no abdominal mass palpated [Normal Posterior Cervical Nodes] : no posterior cervical lymphadenopathy [Normal Bowel Sounds] : normal bowel sounds [No HSM] : no HSM [No CVA Tenderness] : no CVA  tenderness [Normal Anterior Cervical Nodes] : no anterior cervical lymphadenopathy [No Spinal Tenderness] : no spinal tenderness [No Joint Swelling] : no joint swelling [No Rash] : no rash [Grossly Normal Strength/Tone] : grossly normal strength/tone [No Focal Deficits] : no focal deficits [Normal Gait] : normal gait [Coordination Grossly Intact] : coordination grossly intact [Normal Insight/Judgement] : insight and judgment were intact [Deep Tendon Reflexes (DTR)] : deep tendon reflexes were 2+ and symmetric [Normal Affect] : the affect was normal [Comprehensive Foot Exam Normal] : Right and left foot were examined and both feet are normal. No ulcers in either foot. Toes are normal and with full ROM.  Normal tactile sensation with monofilament testing throughout both feet [de-identified] : large umbilical hernia

## 2020-07-17 NOTE — HISTORY OF PRESENT ILLNESS
[No episodes] : No hypoglycemic episodes since the last visit. [Review glucose log over ___ months] : Glucose logs reviewed over the past [unfilled] months reveal: [Fasting:  ___] : Fasting Blood Sugar: [unfilled] mg/dL [] : always in range [de-identified] : here to establish. She has chronic medical conditions. She has cardiologist-note reviewed.  [Understanding of foot care] : Patient expressed understanding of foot care [de-identified] : sent for blood work  [Moderate Intensity] : Patient is currently on moderate intensity statin  therapy [FreeTextEntry1] : Here to establish. She has h/o CAD s/p stenting. pt. was hospitalized in 02/2020 for chest pain .She follows cardiology.   she's had no further recurrences of chest pain. She denies any shortness of breath. Denies palpitations, dizziness, lightheadedness, syncope or near-syncope. No edema, no orthopnea.\par Remains on anticoagulation. Denies any excessive ecchymosis, bleeding, black or bloody stools, hematuria or epistaxis.\par She is taking her meds for HTN. She moved from [facility] where she still owns a house and is temporarily living with his daughter. \par She has umbilical hernia which is chronic and has slightly increased in size. \par \par

## 2020-08-08 ENCOUNTER — RX RENEWAL (OUTPATIENT)
Age: 75
End: 2020-08-08

## 2020-08-24 LAB
ALBUMIN SERPL ELPH-MCNC: 4.6 G/DL
ALP BLD-CCNC: 44 U/L
ALT SERPL-CCNC: 37 U/L
ANION GAP SERPL CALC-SCNC: 13 MMOL/L
APPEARANCE: CLEAR
AST SERPL-CCNC: 33 U/L
BACTERIA: NEGATIVE
BASOPHILS # BLD AUTO: 0.06 K/UL
BASOPHILS NFR BLD AUTO: 0.9 %
BILIRUB SERPL-MCNC: 0.4 MG/DL
BILIRUBIN URINE: NEGATIVE
BLOOD URINE: NEGATIVE
BUN SERPL-MCNC: 25 MG/DL
CALCIUM SERPL-MCNC: 9 MG/DL
CHLORIDE SERPL-SCNC: 101 MMOL/L
CHOLEST SERPL-MCNC: 159 MG/DL
CHOLEST/HDLC SERPL: 4.1 RATIO
CO2 SERPL-SCNC: 24 MMOL/L
COLOR: NORMAL
CREAT SERPL-MCNC: 0.8 MG/DL
EOSINOPHIL # BLD AUTO: 0.2 K/UL
EOSINOPHIL NFR BLD AUTO: 2.9 %
FERRITIN SERPL-MCNC: 47 NG/ML
FOLATE SERPL-MCNC: >20 NG/ML
GLUCOSE QUALITATIVE U: NEGATIVE
GLUCOSE SERPL-MCNC: 205 MG/DL
HAV IGM SER QL: NONREACTIVE
HBV CORE IGG+IGM SER QL: NONREACTIVE
HBV CORE IGM SER QL: NONREACTIVE
HBV SURFACE AB SER QL: NONREACTIVE
HBV SURFACE AB SERPL IA-ACNC: <3 MIU/ML
HBV SURFACE AG SER QL: NONREACTIVE
HCT VFR BLD CALC: 36.4 %
HCV AB SER QL: NONREACTIVE
HCV S/CO RATIO: 0.07 S/CO
HDLC SERPL-MCNC: 39 MG/DL
HGB BLD-MCNC: 11.9 G/DL
HYALINE CASTS: 0 /LPF
IMM GRANULOCYTES NFR BLD AUTO: 0.3 %
IRON SATN MFR SERPL: 14 %
IRON SERPL-MCNC: 54 UG/DL
KETONES URINE: NEGATIVE
LDLC SERPL CALC-MCNC: 67 MG/DL
LEUKOCYTE ESTERASE URINE: ABNORMAL
LYMPHOCYTES # BLD AUTO: 1.79 K/UL
LYMPHOCYTES NFR BLD AUTO: 25.5 %
MAGNESIUM SERPL-MCNC: 1.2 MG/DL
MAN DIFF?: NORMAL
MCHC RBC-ENTMCNC: 29 PG
MCHC RBC-ENTMCNC: 32.7 GM/DL
MCV RBC AUTO: 88.8 FL
MICROSCOPIC-UA: NORMAL
MONOCYTES # BLD AUTO: 0.8 K/UL
MONOCYTES NFR BLD AUTO: 11.4 %
NEUTROPHILS # BLD AUTO: 4.14 K/UL
NEUTROPHILS NFR BLD AUTO: 59 %
NITRITE URINE: POSITIVE
PH URINE: 6
PLATELET # BLD AUTO: 231 K/UL
POTASSIUM SERPL-SCNC: 5.1 MMOL/L
PROT SERPL-MCNC: 7.4 G/DL
PROTEIN URINE: ABNORMAL
RBC # BLD: 4.1 M/UL
RBC # FLD: 12.8 %
RED BLOOD CELLS URINE: 1 /HPF
SODIUM SERPL-SCNC: 138 MMOL/L
SPECIFIC GRAVITY URINE: 1.01
SQUAMOUS EPITHELIAL CELLS: 4 /HPF
TIBC SERPL-MCNC: 386 UG/DL
TRIGL SERPL-MCNC: 266 MG/DL
TSH SERPL-ACNC: 1.08 UIU/ML
UIBC SERPL-MCNC: 333 UG/DL
URATE SERPL-MCNC: 7.6 MG/DL
UROBILINOGEN URINE: NORMAL
VIT B12 SERPL-MCNC: 373 PG/ML
WBC # FLD AUTO: 7.01 K/UL
WHITE BLOOD CELLS URINE: 18 /HPF

## 2020-08-25 LAB
CREAT SPEC-SCNC: 49 MG/DL
ESTIMATED AVERAGE GLUCOSE: 226 MG/DL
HBA1C MFR BLD HPLC: 9.5 %
HBV DNA # SERPL NAA+PROBE: NOT DETECTED IU/ML
HBV E AB SER QL: NEGATIVE
HBV E AG SER QL: NEGATIVE
HEPB DNA PCR LOG: NOT DETECTED LOG10IU/ML
MICROALBUMIN 24H UR DL<=1MG/L-MCNC: 10.2 MG/DL
MICROALBUMIN/CREAT 24H UR-RTO: 209 MG/G

## 2020-08-31 ENCOUNTER — APPOINTMENT (OUTPATIENT)
Dept: FAMILY MEDICINE | Facility: CLINIC | Age: 75
End: 2020-08-31
Payer: MEDICARE

## 2020-08-31 VITALS
TEMPERATURE: 96.4 F | HEART RATE: 76 BPM | DIASTOLIC BLOOD PRESSURE: 80 MMHG | WEIGHT: 240 LBS | OXYGEN SATURATION: 96 % | SYSTOLIC BLOOD PRESSURE: 134 MMHG | BODY MASS INDEX: 44.16 KG/M2 | HEIGHT: 62 IN

## 2020-08-31 DIAGNOSIS — R80.9 PROTEINURIA, UNSPECIFIED: ICD-10-CM

## 2020-08-31 DIAGNOSIS — Z13.820 ENCOUNTER FOR SCREENING FOR OSTEOPOROSIS: ICD-10-CM

## 2020-08-31 PROCEDURE — 99214 OFFICE O/P EST MOD 30 MIN: CPT

## 2020-08-31 NOTE — HISTORY OF PRESENT ILLNESS
[No episodes] : No hypoglycemic episodes since the last visit. [Check glucose ___ x/day] : Patient checks  blood glucose [unfilled]  times a day [Fasting:  ___] : Fasting Blood Sugar: [unfilled] mg/dL [] : always in range [Regular podiatrist visits] : Patient had regular podiatrist visits [No Retinopathy] : No retinopathy [Understanding of foot care] : Patient expressed understanding of foot care [Most Recent A1C: ___] : Most recent A1C was [unfilled] [Target A1C:  ___] : Target A1C is [unfilled] [Target goal not met] : A1C target goal not met [Neuropathy] :  neuropathy [Moderate Intensity] : Patient is currently on moderate intensity statin  therapy [FreeTextEntry1] : f/u DM-2, hypertriglyceridemia. [EyeExamDate] : 06/20 [de-identified] : Here for f/u DM-2, hypertriglyceridemia. She has gained weight and is not eating healthy diet. Eye doctor: dr. Mckeon from [facility] in [place] 2 months ago , no retinopathy and Podiatry dr. Leon in [place]. last mammogram in 07/2020 in [place]. Denies urinary symptoms. labs reviewed.

## 2020-08-31 NOTE — PLAN
[FreeTextEntry1] : declined colon cancer screen.\par  start Insulin .\par  low fat diet.\par  better glycemic control.\par  D/C MANN if hypoglycemia. \par  flu vaccine deferred. \par  pt. has received Pneumovax. \par  B 12 supplement, low purine diet. \par monitor microalbumin. \par Magnesium supplement. \par offered HAV, HBV, pt. deferred. \par

## 2020-08-31 NOTE — PHYSICAL EXAM
[Well Nourished] : well nourished [No Acute Distress] : no acute distress [Well Developed] : well developed [Normal Sclera/Conjunctiva] : normal sclera/conjunctiva [Well-Appearing] : well-appearing [PERRL] : pupils equal round and reactive to light [EOMI] : extraocular movements intact [Normal Outer Ear/Nose] : the outer ears and nose were normal in appearance [Normal Oropharynx] : the oropharynx was normal [No JVD] : no jugular venous distention [No Lymphadenopathy] : no lymphadenopathy [Supple] : supple [Thyroid Normal, No Nodules] : the thyroid was normal and there were no nodules present [No Accessory Muscle Use] : no accessory muscle use [No Respiratory Distress] : no respiratory distress  [Clear to Auscultation] : lungs were clear to auscultation bilaterally [Normal Rate] : normal rate  [Normal S1, S2] : normal S1 and S2 [Regular Rhythm] : with a regular rhythm [No Murmur] : no murmur heard [No Carotid Bruits] : no carotid bruits [No Abdominal Bruit] : a ~M bruit was not heard ~T in the abdomen [No Varicosities] : no varicosities [Pedal Pulses Present] : the pedal pulses are present [No Edema] : there was no peripheral edema [No Palpable Aorta] : no palpable aorta [No Extremity Clubbing/Cyanosis] : no extremity clubbing/cyanosis [Non Tender] : non-tender [Soft] : abdomen soft [Non-distended] : non-distended [No Masses] : no abdominal mass palpated [No HSM] : no HSM [Normal Bowel Sounds] : normal bowel sounds [Normal Posterior Cervical Nodes] : no posterior cervical lymphadenopathy [No CVA Tenderness] : no CVA  tenderness [Normal Anterior Cervical Nodes] : no anterior cervical lymphadenopathy [No Spinal Tenderness] : no spinal tenderness [No Joint Swelling] : no joint swelling [Grossly Normal Strength/Tone] : grossly normal strength/tone [No Rash] : no rash [Coordination Grossly Intact] : coordination grossly intact [No Focal Deficits] : no focal deficits [Normal Gait] : normal gait [Normal Insight/Judgement] : insight and judgment were intact [Normal Affect] : the affect was normal

## 2020-08-31 NOTE — HEALTH RISK ASSESSMENT
[No] : In the past 12 months have you used drugs other than those required for medical reasons? No [No falls in past year] : Patient reported no falls in the past year [0] : 1) Little interest or pleasure doing things: Not at all (0) [] : No [de-identified] : sedentary [de-identified] : regular [[id number]] : 0

## 2020-10-16 LAB
INR PPP: 1.6
INR PPP: 1.6
INR PPP: 1.9

## 2020-11-05 ENCOUNTER — TRANSCRIBE ORDERS (OUTPATIENT)
Dept: ADMINISTRATIVE | Facility: HOSPITAL | Age: 75
End: 2020-11-05

## 2020-11-05 ENCOUNTER — APPOINTMENT (OUTPATIENT)
Dept: RADIOLOGY | Facility: CLINIC | Age: 75
End: 2020-11-05
Payer: MEDICARE

## 2020-11-05 DIAGNOSIS — M79.672 LEFT FOOT PAIN: Primary | ICD-10-CM

## 2020-11-05 DIAGNOSIS — M79.672 LEFT FOOT PAIN: ICD-10-CM

## 2020-11-05 LAB — INR PPP: 2.2

## 2020-11-05 PROCEDURE — 73630 X-RAY EXAM OF FOOT: CPT

## 2020-11-12 ENCOUNTER — RX RENEWAL (OUTPATIENT)
Age: 75
End: 2020-11-12

## 2020-11-19 LAB — INR PPP: 2

## 2020-11-24 ENCOUNTER — APPOINTMENT (OUTPATIENT)
Dept: CARDIOLOGY | Facility: CLINIC | Age: 75
End: 2020-11-24

## 2020-11-24 ENCOUNTER — APPOINTMENT (OUTPATIENT)
Dept: FAMILY MEDICINE | Facility: CLINIC | Age: 75
End: 2020-11-24

## 2020-12-08 LAB — INR PPP: 2.6

## 2020-12-21 ENCOUNTER — APPOINTMENT (OUTPATIENT)
Dept: CARDIOLOGY | Facility: CLINIC | Age: 75
End: 2020-12-21
Payer: MEDICARE

## 2020-12-21 ENCOUNTER — NON-APPOINTMENT (OUTPATIENT)
Age: 75
End: 2020-12-21

## 2020-12-21 VITALS
TEMPERATURE: 97.2 F | WEIGHT: 216 LBS | SYSTOLIC BLOOD PRESSURE: 147 MMHG | HEART RATE: 68 BPM | HEIGHT: 62 IN | DIASTOLIC BLOOD PRESSURE: 78 MMHG | BODY MASS INDEX: 39.75 KG/M2 | RESPIRATION RATE: 20 BRPM | OXYGEN SATURATION: 96 %

## 2020-12-21 PROCEDURE — 93000 ELECTROCARDIOGRAM COMPLETE: CPT

## 2020-12-21 PROCEDURE — 99214 OFFICE O/P EST MOD 30 MIN: CPT

## 2020-12-22 DIAGNOSIS — R53.83 OTHER FATIGUE: ICD-10-CM

## 2021-01-03 ENCOUNTER — RX RENEWAL (OUTPATIENT)
Age: 76
End: 2021-01-03

## 2021-01-05 LAB
ALBUMIN SERPL ELPH-MCNC: 4.9 G/DL
ALP BLD-CCNC: 47 U/L
ALT SERPL-CCNC: 29 U/L
ANION GAP SERPL CALC-SCNC: 12 MMOL/L
APPEARANCE: CLEAR
AST SERPL-CCNC: 30 U/L
BACTERIA: ABNORMAL
BASOPHILS # BLD AUTO: 0.06 K/UL
BASOPHILS NFR BLD AUTO: 1.1 %
BILIRUB SERPL-MCNC: 0.4 MG/DL
BILIRUBIN URINE: NEGATIVE
BLOOD URINE: NEGATIVE
BUN SERPL-MCNC: 47 MG/DL
CALCIUM SERPL-MCNC: 9.9 MG/DL
CHLORIDE SERPL-SCNC: 100 MMOL/L
CHOLEST SERPL-MCNC: 178 MG/DL
CO2 SERPL-SCNC: 28 MMOL/L
COLOR: NORMAL
CREAT SERPL-MCNC: 1.11 MG/DL
EOSINOPHIL # BLD AUTO: 0.15 K/UL
EOSINOPHIL NFR BLD AUTO: 2.7 %
ESTIMATED AVERAGE GLUCOSE: 131 MG/DL
GLUCOSE QUALITATIVE U: NEGATIVE
GLUCOSE SERPL-MCNC: 116 MG/DL
HBA1C MFR BLD HPLC: 6.2 %
HCT VFR BLD CALC: 40.5 %
HDLC SERPL-MCNC: 41 MG/DL
HGB BLD-MCNC: 13.1 G/DL
HYALINE CASTS: 2 /LPF
IMM GRANULOCYTES NFR BLD AUTO: 0.2 %
KETONES URINE: NEGATIVE
LDLC SERPL CALC-MCNC: 93 MG/DL
LEUKOCYTE ESTERASE URINE: ABNORMAL
LYMPHOCYTES # BLD AUTO: 1.53 K/UL
LYMPHOCYTES NFR BLD AUTO: 27.7 %
MAN DIFF?: NORMAL
MCHC RBC-ENTMCNC: 29 PG
MCHC RBC-ENTMCNC: 32.3 GM/DL
MCV RBC AUTO: 89.8 FL
MICROSCOPIC-UA: NORMAL
MONOCYTES # BLD AUTO: 0.56 K/UL
MONOCYTES NFR BLD AUTO: 10.1 %
NEUTROPHILS # BLD AUTO: 3.22 K/UL
NEUTROPHILS NFR BLD AUTO: 58.2 %
NITRITE URINE: POSITIVE
NONHDLC SERPL-MCNC: 137 MG/DL
PH URINE: 6
PLATELET # BLD AUTO: 235 K/UL
POTASSIUM SERPL-SCNC: 4.9 MMOL/L
PROT SERPL-MCNC: 7.9 G/DL
PROTEIN URINE: NEGATIVE
RBC # BLD: 4.51 M/UL
RBC # FLD: 13.1 %
RED BLOOD CELLS URINE: 1 /HPF
SODIUM SERPL-SCNC: 140 MMOL/L
SPECIFIC GRAVITY URINE: 1.02
SQUAMOUS EPITHELIAL CELLS: 1 /HPF
TRIGL SERPL-MCNC: 219 MG/DL
TSH SERPL-ACNC: 1.1 UIU/ML
UROBILINOGEN URINE: NORMAL
WBC # FLD AUTO: 5.53 K/UL
WHITE BLOOD CELLS URINE: 27 /HPF

## 2021-01-07 LAB — INR PPP: 1.3

## 2021-01-11 ENCOUNTER — APPOINTMENT (OUTPATIENT)
Dept: FAMILY MEDICINE | Facility: CLINIC | Age: 76
End: 2021-01-11

## 2021-01-18 ENCOUNTER — RX RENEWAL (OUTPATIENT)
Age: 76
End: 2021-01-18

## 2021-02-01 ENCOUNTER — RX RENEWAL (OUTPATIENT)
Age: 76
End: 2021-02-01

## 2021-02-01 DIAGNOSIS — E87.6 HYPOKALEMIA: ICD-10-CM

## 2021-02-03 RX ORDER — POTASSIUM CHLORIDE 20 MEQ/1
TABLET, EXTENDED RELEASE ORAL
Qty: 180 TABLET | Refills: 3 | OUTPATIENT
Start: 2021-02-03

## 2021-02-05 NOTE — PROGRESS NOTES
Progress Note - Critical Care   Sandy Cardenas 71 y o  female MRN: [id number]  Unit/Bed#:  Encounter: [id number]    Assessment/Plan:  1  Hypertensive wtmapm-eswblojj-[id number] oral antihypertensives, renal artery US negative for stenosis, urine catecholamines pending this morning, neurology following  2  Factor 5 Leiden-INR subtherapeutic, continue warfarin with heparin bridge  3  Type 2 DM-continue accuchecks and SSI  4  Afib-currently rate controlled-continue medications      _____________________________________________________________________    HPI/24hr events:   No overnight events      Medications:    Current Facility-Administered Medications:  acetaminophen 650 mg Oral Q6H PRN Corinna Lima PA-C    amLODIPine 5 mg Oral Daily Jyoti Rosas MD    carvedilol 12 5 mg Oral BID With Meals Gracy Basurto PA-C    chlorhexidine 15 mL Swish & Spit Q12H [facility] MAME Chi    docusate sodium 100 mg Oral BID Corinna Lima PA-C    furosemide 40 mg Oral Daily MAME Chi    heparin (porcine) 3-20 Units/kg/hr (Order-Specific) Intravenous Titrated MAME Chi Last Rate: 14 1 Units/kg/hr (03/24/18 1113)   heparin (porcine) 2,000 Units Intravenous PRN MAME Chi    heparin (porcine) 4,000 Units Intravenous PRN MAME Chi    hydrALAZINE 20 mg Intravenous Q6H PRN MAME Chi    insulin lispro 1-6 Units Subcutaneous TID AC Corinna Lima PA-C    lisinopril 5 mg Oral Daily Vineets LEOLA Lima    loratadine 10 mg Oral Daily MAME Chi    pantoprazole 20 mg Oral BID AC MAME Chi    pravastatin 40 mg Oral Daily With MAME Levy    senna 1 tablet Oral HS Corinna Lima PA-C    warfarin 5 mg Oral Daily (warfarin) Frantz Biggs MD          heparin (porcine) 3-20 Units/kg/hr (Order-Specific) Last Rate: 14 1 Units/kg/hr (03/24/18 1113)       Physical exam:  Vitals:   Vitals:    03/24/18 2112 03/24/18 2337 03/24/18 2338 03/25/18 0000   BP:  148/58 151/65    BP Location:  Right arm Right arm    Pulse:    72   Resp:    (!) 23   Temp:   98 5 °F (36 9 °C)    TempSrc:   Oral    SpO2: 95%   97%   Weight:       Height:         Body mass index is 44 8 kg/m²  Temp  Min: 97 5 °F (36 4 °C)  Max: 98 5 °F (36 9 °C)  IBW: 50 1 kg    SpO2: 97 %,   SpO2 Activity: At Rest,   O2 Device: None (Room air)      Intake/Output Summary (Last 24 hours) at 03/25/18 0530  Last data filed at 03/25/18 0401   Gross per 24 hour   Intake          1539 19 ml   Output             2100 ml   Net          -560 81 ml       Invasive/non-invasive ventilation settings:   Respiratory    Lab Data (Last 4 hours)    None         O2/Vent Data (Last 4 hours)    None              Invasive Devices     Peripheral Intravenous Line            Peripheral IV 03/23/18 Right Antecubital 1 day                        Physical Exam:  Gen: A, A, Ox3, NAD  HEENT: NC/AT, PERRL  Neck: supple, no JVD  Chest: CTA  Cor: S1, S2, irreg  Abd: soft, +BS  Ext: +edema  Neuro: nonfocal  Skin: warm, dry      Diagnostic Data:  Lab: I have personally reviewed pertinent lab results     CBC:     Results from last 7 days  Lab Units 03/24/18  0725 03/23/18  0540 03/22/18  1721   WBC Thousand/uL 7 15 8 10 8 64   HEMOGLOBIN g/dL 12 3 11 7 12 3   HEMATOCRIT % 37 3 35 4 36 6   PLATELETS Thousands/uL 218 223 248       CMP:     Results from last 7 days  Lab Units 03/24/18  0725 03/23/18  0540 03/22/18  1324   SODIUM mmol/L 138 139 140   POTASSIUM mmol/L 4 5 3 7 4 3   CHLORIDE mmol/L 102 102 101   CO2 mmol/L 26 27 29   BUN mg/dL 26* 19 21   CREATININE mg/dL 0 91 0 75 0 87   CALCIUM mg/dL 8 8 9 1 9 4   TOTAL PROTEIN g/dL  --   --  8 5*   BILIRUBIN TOTAL mg/dL  --   --  0 50   ALK PHOS U/L  --   --  49   ALT U/L  --   --  58   AST U/L  --   --  37   GLUCOSE RANDOM mg/dL 180* 133 127     PT/INR:   Lab Results   Component Value Date    INR 1 46 (H) 03/24/2018   ,   Magnesium:   Results from last 7 days  Lab Units 03/24/18  0750 03/23/18  0540   MAGNESIUM mg/dL 1 6 1 3*     Phosphorous:   Results from last 7 days  Lab Units 03/23/18  0540   PHOSPHORUS mg/dL 4 3*       Microbiology:         Cardiac lab/EKG/telemetry/ECHO:     Results from last 7 days  Lab Units 03/22/18  2035 03/22/18  1721 03/22/18  1325   TROPONIN I ng/mL <0 02 <0 02 <0 02           VTE Prophylaxis: warfarin/Heparin drip bridge      Code Status: Level 1 - Full Code    MAME Baltazar    Portions of the record may have been created with voice recognition software  Occasional wrong word or "sound a like" substitutions may have occurred due to the inherent limitations of voice recognition software  Read the chart carefully and recognize, using context, where substitutions have occurred  Statement Selected

## 2021-03-09 DIAGNOSIS — Z23 ENCOUNTER FOR IMMUNIZATION: ICD-10-CM

## 2021-03-15 DIAGNOSIS — E87.6 HYPOKALEMIA: ICD-10-CM

## 2021-03-15 RX ORDER — POTASSIUM CHLORIDE 20 MEQ/1
TABLET, EXTENDED RELEASE ORAL
Qty: 60 TABLET | Refills: 11 | Status: SHIPPED | OUTPATIENT
Start: 2021-03-15

## 2021-03-18 ENCOUNTER — APPOINTMENT (OUTPATIENT)
Dept: FAMILY MEDICINE | Facility: CLINIC | Age: 76
End: 2021-03-18
Payer: MEDICARE

## 2021-03-18 VITALS
WEIGHT: 210 LBS | TEMPERATURE: 96.4 F | SYSTOLIC BLOOD PRESSURE: 122 MMHG | RESPIRATION RATE: 15 BRPM | DIASTOLIC BLOOD PRESSURE: 60 MMHG | BODY MASS INDEX: 38.64 KG/M2 | HEIGHT: 62 IN | OXYGEN SATURATION: 99 % | HEART RATE: 73 BPM

## 2021-03-18 DIAGNOSIS — Z87.440 PERSONAL HISTORY OF URINARY (TRACT) INFECTIONS: ICD-10-CM

## 2021-03-18 DIAGNOSIS — K42.9 UMBILICAL HERNIA W/OUT OBSTRUCTION OR GANGRENE: ICD-10-CM

## 2021-03-18 DIAGNOSIS — Z23 ENCOUNTER FOR IMMUNIZATION: ICD-10-CM

## 2021-03-18 PROCEDURE — 99214 OFFICE O/P EST MOD 30 MIN: CPT

## 2021-03-18 RX ORDER — CIPROFLOXACIN HYDROCHLORIDE 250 MG/1
250 TABLET, FILM COATED ORAL TWICE DAILY
Qty: 14 | Refills: 0 | Status: DISCONTINUED | COMMUNITY
Start: 2021-01-13 | End: 2021-03-18

## 2021-03-18 RX ORDER — INSULIN GLARGINE 100 [IU]/ML
100 INJECTION, SOLUTION SUBCUTANEOUS
Qty: 1 | Refills: 0 | Status: DISCONTINUED | COMMUNITY
Start: 2020-08-31 | End: 2021-03-18

## 2021-03-18 NOTE — HEALTH RISK ASSESSMENT
[No] : In the past 12 months have you used drugs other than those required for medical reasons? No [No falls in past year] : Patient reported no falls in the past year [0] : 2) Feeling down, depressed, or hopeless: Not at all (0) [] : No [de-identified] : sedentary [de-identified] : regular [[id number]] : 0

## 2021-03-18 NOTE — HISTORY OF PRESENT ILLNESS
[No episodes] : No hypoglycemic episodes since the last visit. [Check glucose ___ x/day] : Patient checks  blood glucose [unfilled]  times a day [] : always in range [Regular podiatrist visits] : Patient had regular podiatrist visits [Understanding of foot care] : Patient expressed understanding of foot care [No Retinopathy] : No retinopathy [Target A1C:  ___] : Target A1C is [unfilled] [Moderate Intensity] : Patient is currently on moderate intensity statin  therapy [Fasting:  ___] : Fasting Blood Sugar: [unfilled] mg/dL [Most Recent A1C: ___] : Most recent A1C was [unfilled] [Target goal met] : A1C target goal met [FreeTextEntry1] : f/u DM-2, hypertriglyceridemia. [de-identified] : Here for f/u DM-2, hypertriglyceridemia. She has lost weight and is  eating healthy diet. She is not exercising as she has been living in [facility]. Eye doctor: dr. Mckeon from [facility] in [place] in 06/2020 , no retinopathy and Podiatry dr. Leon in [place]. last mammogram in 07/2020 in [place]. Denies urinary symptoms. labs reviewed. Patient has umbilical hernia for many years and she gets abdominal pain off and on .She hears hyperactive bowel sounds. pt. did not start insulin. She saw cardiologist. [EyeExamDate] : 06/20

## 2021-03-18 NOTE — PHYSICAL EXAM
[Well Nourished] : well nourished [Well Developed] : well developed [Well-Appearing] : well-appearing [Normal Sclera/Conjunctiva] : normal sclera/conjunctiva [PERRL] : pupils equal round and reactive to light [EOMI] : extraocular movements intact [Normal Outer Ear/Nose] : the outer ears and nose were normal in appearance [Normal Oropharynx] : the oropharynx was normal [No JVD] : no jugular venous distention [No Lymphadenopathy] : no lymphadenopathy [Supple] : supple [Thyroid Normal, No Nodules] : the thyroid was normal and there were no nodules present [No Respiratory Distress] : no respiratory distress  [No Accessory Muscle Use] : no accessory muscle use [Clear to Auscultation] : lungs were clear to auscultation bilaterally [Normal Rate] : normal rate  [Regular Rhythm] : with a regular rhythm [Normal S1, S2] : normal S1 and S2 [No Murmur] : no murmur heard [No Carotid Bruits] : no carotid bruits [No Abdominal Bruit] : a ~M bruit was not heard ~T in the abdomen [No Varicosities] : no varicosities [Pedal Pulses Present] : the pedal pulses are present [No Edema] : there was no peripheral edema [No Palpable Aorta] : no palpable aorta [No Extremity Clubbing/Cyanosis] : no extremity clubbing/cyanosis [Soft] : abdomen soft [Non Tender] : non-tender [Non-distended] : non-distended [No Masses] : no abdominal mass palpated [No HSM] : no HSM [Normal Bowel Sounds] : normal bowel sounds [Normal Posterior Cervical Nodes] : no posterior cervical lymphadenopathy [Normal Anterior Cervical Nodes] : no anterior cervical lymphadenopathy [No CVA Tenderness] : no CVA  tenderness [No Spinal Tenderness] : no spinal tenderness [No Joint Swelling] : no joint swelling [Grossly Normal Strength/Tone] : grossly normal strength/tone [No Rash] : no rash [Coordination Grossly Intact] : coordination grossly intact [No Focal Deficits] : no focal deficits [Normal Gait] : normal gait [Normal Affect] : the affect was normal [Normal Insight/Judgement] : insight and judgment were intact [Comprehensive Foot Exam Normal] : Right and left foot were examined and both feet are normal. No ulcers in either foot. Toes are normal and with full ROM.  Normal tactile sensation with monofilament testing throughout both feet

## 2021-03-18 NOTE — PLAN
[FreeTextEntry1] : declined colon cancer screen.\par  low fat diet.\par  better glycemic control.\par  pt. has received Pneumovax. \par  B 12 supplement, low purine diet. \par \par

## 2021-03-19 LAB
ALBUMIN SERPL ELPH-MCNC: 4.4 G/DL
ALP BLD-CCNC: 41 U/L
ALT SERPL-CCNC: 26 U/L
ANION GAP SERPL CALC-SCNC: 11 MMOL/L
AST SERPL-CCNC: 27 U/L
BILIRUB SERPL-MCNC: 0.3 MG/DL
BUN SERPL-MCNC: 51 MG/DL
CALCIUM SERPL-MCNC: 9.8 MG/DL
CHLORIDE SERPL-SCNC: 101 MMOL/L
CHOLEST SERPL-MCNC: 207 MG/DL
CO2 SERPL-SCNC: 27 MMOL/L
CREAT SERPL-MCNC: 0.99 MG/DL
ESTIMATED AVERAGE GLUCOSE: 123 MG/DL
GLUCOSE SERPL-MCNC: 74 MG/DL
HBA1C MFR BLD HPLC: 5.9 %
HDLC SERPL-MCNC: 42 MG/DL
INR PPP: 1.91 RATIO
LDLC SERPL CALC-MCNC: 111 MG/DL
NONHDLC SERPL-MCNC: 165 MG/DL
POTASSIUM SERPL-SCNC: 4.4 MMOL/L
PROT SERPL-MCNC: 7.3 G/DL
PT BLD: 21.8 SEC
SODIUM SERPL-SCNC: 139 MMOL/L
TRIGL SERPL-MCNC: 270 MG/DL

## 2021-03-25 RX ORDER — WARFARIN 2.5 MG/1
2.5 TABLET ORAL
Qty: 120 | Refills: 1 | Status: COMPLETED | COMMUNITY
Start: 2018-04-18 | End: 2021-03-25

## 2021-03-26 ENCOUNTER — IMMUNIZATIONS (OUTPATIENT)
Dept: FAMILY MEDICINE CLINIC | Facility: HOSPITAL | Age: 76
End: 2021-03-26

## 2021-03-26 DIAGNOSIS — Z23 ENCOUNTER FOR IMMUNIZATION: Primary | ICD-10-CM

## 2021-03-26 PROCEDURE — 0001A SARS-COV-2 / COVID-19 MRNA VACCINE (PFIZER-BIONTECH) 30 MCG: CPT

## 2021-03-26 PROCEDURE — 91300 SARS-COV-2 / COVID-19 MRNA VACCINE (PFIZER-BIONTECH) 30 MCG: CPT

## 2021-04-06 ENCOUNTER — RX RENEWAL (OUTPATIENT)
Age: 76
End: 2021-04-06

## 2021-04-18 ENCOUNTER — IMMUNIZATIONS (OUTPATIENT)
Dept: FAMILY MEDICINE CLINIC | Facility: HOSPITAL | Age: 76
End: 2021-04-18

## 2021-04-18 DIAGNOSIS — Z23 ENCOUNTER FOR IMMUNIZATION: Primary | ICD-10-CM

## 2021-04-18 PROCEDURE — 0002A SARS-COV-2 / COVID-19 MRNA VACCINE (PFIZER-BIONTECH) 30 MCG: CPT

## 2021-04-18 PROCEDURE — 91300 SARS-COV-2 / COVID-19 MRNA VACCINE (PFIZER-BIONTECH) 30 MCG: CPT

## 2021-05-27 ENCOUNTER — RX RENEWAL (OUTPATIENT)
Age: 76
End: 2021-05-27

## 2021-06-14 ENCOUNTER — RX RENEWAL (OUTPATIENT)
Age: 76
End: 2021-06-14

## 2021-06-22 ENCOUNTER — APPOINTMENT (OUTPATIENT)
Dept: CARDIOLOGY | Facility: CLINIC | Age: 76
End: 2021-06-22
Payer: MEDICARE

## 2021-06-22 ENCOUNTER — NON-APPOINTMENT (OUTPATIENT)
Age: 76
End: 2021-06-22

## 2021-06-22 VITALS
WEIGHT: 208 LBS | HEIGHT: 62 IN | OXYGEN SATURATION: 98 % | RESPIRATION RATE: 19 BRPM | TEMPERATURE: 96.4 F | DIASTOLIC BLOOD PRESSURE: 82 MMHG | SYSTOLIC BLOOD PRESSURE: 164 MMHG | HEART RATE: 69 BPM | BODY MASS INDEX: 38.28 KG/M2

## 2021-06-22 DIAGNOSIS — Z86.79 PERSONAL HISTORY OF OTHER DISEASES OF THE CIRCULATORY SYSTEM: ICD-10-CM

## 2021-06-22 PROCEDURE — 99214 OFFICE O/P EST MOD 30 MIN: CPT

## 2021-06-22 PROCEDURE — 93000 ELECTROCARDIOGRAM COMPLETE: CPT

## 2021-06-23 LAB
INR PPP: 2
INR PPP: 2.1
INR PPP: 2.8

## 2021-07-13 LAB — INR PPP: 1.9

## 2021-07-30 LAB — INR PPP: 2.4

## 2021-08-06 ENCOUNTER — APPOINTMENT (OUTPATIENT)
Dept: CARDIOLOGY | Facility: CLINIC | Age: 76
End: 2021-08-06
Payer: MEDICARE

## 2021-08-06 PROCEDURE — 93306 TTE W/DOPPLER COMPLETE: CPT

## 2021-08-20 LAB — INR PPP: 3.1

## 2021-09-10 LAB — INR PPP: 2.4

## 2021-09-24 LAB — INR PPP: 2.1

## 2021-11-05 LAB — INR PPP: 2.5

## 2021-11-10 ENCOUNTER — RX RENEWAL (OUTPATIENT)
Age: 76
End: 2021-11-10

## 2021-11-17 LAB — INR PPP: 2.1

## 2021-11-22 ENCOUNTER — APPOINTMENT (OUTPATIENT)
Dept: FAMILY MEDICINE | Facility: CLINIC | Age: 76
End: 2021-11-22
Payer: MEDICARE

## 2021-11-22 VITALS
HEIGHT: 62 IN | HEART RATE: 66 BPM | SYSTOLIC BLOOD PRESSURE: 147 MMHG | OXYGEN SATURATION: 95 % | RESPIRATION RATE: 14 BRPM | TEMPERATURE: 97.6 F | DIASTOLIC BLOOD PRESSURE: 79 MMHG | BODY MASS INDEX: 36.99 KG/M2 | WEIGHT: 201 LBS

## 2021-11-22 DIAGNOSIS — Z87.898 PERSONAL HISTORY OF OTHER SPECIFIED CONDITIONS: ICD-10-CM

## 2021-11-22 PROCEDURE — 99214 OFFICE O/P EST MOD 30 MIN: CPT

## 2021-11-22 NOTE — HEALTH RISK ASSESSMENT
[No] : In the past 12 months have you used drugs other than those required for medical reasons? No [No falls in past year] : Patient reported no falls in the past year [0] : 2) Feeling down, depressed, or hopeless: Not at all (0) [With Patient/Caregiver] : , with patient/caregiver [] : No [de-identified] : sedentary [de-identified] : regular [[id number]] : 0 [AdvancecareDate] : 11/21

## 2021-11-22 NOTE — HISTORY OF PRESENT ILLNESS
[No episodes] : No hypoglycemic episodes since the last visit. [Check glucose ___ x/day] : Patient checks  blood glucose [unfilled]  times a day [] : always in range [Regular podiatrist visits] : Patient had regular podiatrist visits [Understanding of foot care] : Patient expressed understanding of foot care [No Retinopathy] : No retinopathy [Most Recent A1C: ___] : Most recent A1C was [unfilled] [Target A1C:  ___] : Target A1C is [unfilled] [Target goal met] : A1C target goal met [Moderate Intensity] : Patient is currently on moderate intensity statin  therapy [Fasting:  ___] : Fasting Blood Sugar: [unfilled] mg/dL [FreeTextEntry1] : f/u DM-2, hypertriglyceridemia. [de-identified] : Here for f/u DM-2, hypertriglyceridemia. She lives in [facility] and is in town to have some dental; works time and spend holiday with family. Eye doctor: dr. Mckeon from [facility] in [place] in 06/2020 , no retinopathy and Podiatry dr. Leon in [place]. last mammogram in 07/2020 in [place]. Denies urinary symptoms. labs reviewed. Patient has umbilical hernia for many years and she denies abd. pain. She saw cardiologist in 06/2021.\par She just got Pfizer vaccine. Her fasting blood sugar is 103. She is eating healthier diet and has lost weight. She is going to [facility]. 10/2016 colonoscopy  repeat in 10 years \par  mammo 03/21\par Dexa: osteopenia  10/2021. [EyeExamDate] : 06/20

## 2021-11-24 LAB
25(OH)D3 SERPL-MCNC: 36.4 NG/ML
ALBUMIN SERPL ELPH-MCNC: 4.6 G/DL
ALP BLD-CCNC: 44 U/L
ALT SERPL-CCNC: 17 U/L
ANION GAP SERPL CALC-SCNC: 14 MMOL/L
APPEARANCE: ABNORMAL
AST SERPL-CCNC: 21 U/L
BACTERIA: ABNORMAL
BASOPHILS # BLD AUTO: 0.06 K/UL
BASOPHILS NFR BLD AUTO: 1 %
BILIRUB SERPL-MCNC: 0.4 MG/DL
BILIRUBIN URINE: NEGATIVE
BLOOD URINE: NORMAL
BUN SERPL-MCNC: 52 MG/DL
CALCIUM SERPL-MCNC: 9.6 MG/DL
CHLORIDE SERPL-SCNC: 101 MMOL/L
CHOLEST SERPL-MCNC: 145 MG/DL
CO2 SERPL-SCNC: 23 MMOL/L
COLOR: NORMAL
CREAT SERPL-MCNC: 0.97 MG/DL
CREAT SPEC-SCNC: 21 MG/DL
EOSINOPHIL # BLD AUTO: 0.18 K/UL
EOSINOPHIL NFR BLD AUTO: 3.1 %
ESTIMATED AVERAGE GLUCOSE: 131 MG/DL
FOLATE SERPL-MCNC: >20 NG/ML
GLUCOSE QUALITATIVE U: NEGATIVE
GLUCOSE SERPL-MCNC: 101 MG/DL
HBA1C MFR BLD HPLC: 6.2 %
HCT VFR BLD CALC: 38.7 %
HDLC SERPL-MCNC: 45 MG/DL
HGB BLD-MCNC: 12.5 G/DL
HYALINE CASTS: 1 /LPF
IMM GRANULOCYTES NFR BLD AUTO: 0.2 %
KETONES URINE: NEGATIVE
LDLC SERPL CALC-MCNC: 67 MG/DL
LEUKOCYTE ESTERASE URINE: ABNORMAL
LYMPHOCYTES # BLD AUTO: 2.03 K/UL
LYMPHOCYTES NFR BLD AUTO: 34.8 %
MAGNESIUM SERPL-MCNC: 1.9 MG/DL
MAN DIFF?: NORMAL
MCHC RBC-ENTMCNC: 29.4 PG
MCHC RBC-ENTMCNC: 32.3 GM/DL
MCV RBC AUTO: 91.1 FL
MICROALBUMIN 24H UR DL<=1MG/L-MCNC: 1.5 MG/DL
MICROALBUMIN/CREAT 24H UR-RTO: 68 MG/G
MICROSCOPIC-UA: NORMAL
MONOCYTES # BLD AUTO: 0.59 K/UL
MONOCYTES NFR BLD AUTO: 10.1 %
NEUTROPHILS # BLD AUTO: 2.97 K/UL
NEUTROPHILS NFR BLD AUTO: 50.8 %
NITRITE URINE: POSITIVE
NONHDLC SERPL-MCNC: 101 MG/DL
PH URINE: 6
PLATELET # BLD AUTO: 216 K/UL
POTASSIUM SERPL-SCNC: 4.5 MMOL/L
PROT SERPL-MCNC: 7.3 G/DL
PROTEIN URINE: NEGATIVE
RBC # BLD: 4.25 M/UL
RBC # FLD: 12.7 %
RED BLOOD CELLS URINE: 1 /HPF
SODIUM SERPL-SCNC: 138 MMOL/L
SPECIFIC GRAVITY URINE: 1.01
SQUAMOUS EPITHELIAL CELLS: 0 /HPF
TRIGL SERPL-MCNC: 168 MG/DL
TSH SERPL-ACNC: 1.12 UIU/ML
UROBILINOGEN URINE: NORMAL
VIT B12 SERPL-MCNC: 614 PG/ML
WBC # FLD AUTO: 5.84 K/UL
WHITE BLOOD CELLS URINE: 100 /HPF

## 2021-11-25 ENCOUNTER — RX RENEWAL (OUTPATIENT)
Age: 76
End: 2021-11-25

## 2021-11-30 ENCOUNTER — RX RENEWAL (OUTPATIENT)
Age: 76
End: 2021-11-30

## 2022-01-04 ENCOUNTER — APPOINTMENT (OUTPATIENT)
Dept: CARDIOLOGY | Facility: CLINIC | Age: 77
End: 2022-01-04

## 2022-01-04 ENCOUNTER — APPOINTMENT (OUTPATIENT)
Dept: CARDIOLOGY | Facility: CLINIC | Age: 77
End: 2022-01-04
Payer: MEDICARE

## 2022-01-04 ENCOUNTER — NON-APPOINTMENT (OUTPATIENT)
Age: 77
End: 2022-01-04

## 2022-01-04 VITALS
RESPIRATION RATE: 19 BRPM | HEIGHT: 62 IN | WEIGHT: 200 LBS | BODY MASS INDEX: 36.8 KG/M2 | DIASTOLIC BLOOD PRESSURE: 72 MMHG | OXYGEN SATURATION: 94 % | TEMPERATURE: 97.2 F | SYSTOLIC BLOOD PRESSURE: 156 MMHG | HEART RATE: 68 BPM

## 2022-01-04 LAB — INR PPP: 2.1

## 2022-01-04 PROCEDURE — 93000 ELECTROCARDIOGRAM COMPLETE: CPT

## 2022-01-04 PROCEDURE — 99215 OFFICE O/P EST HI 40 MIN: CPT

## 2022-01-07 ENCOUNTER — APPOINTMENT (OUTPATIENT)
Dept: CARDIOLOGY | Facility: CLINIC | Age: 77
End: 2022-01-07
Payer: MEDICARE

## 2022-01-07 PROCEDURE — 93306 TTE W/DOPPLER COMPLETE: CPT

## 2022-01-11 ENCOUNTER — APPOINTMENT (OUTPATIENT)
Dept: RADIOLOGY | Facility: HOSPITAL | Age: 77
End: 2022-01-11
Payer: MEDICARE

## 2022-01-11 ENCOUNTER — OUTPATIENT (OUTPATIENT)
Dept: OUTPATIENT SERVICES | Facility: HOSPITAL | Age: 77
LOS: 1 days | End: 2022-01-11
Payer: MEDICARE

## 2022-01-11 DIAGNOSIS — Z98.890 OTHER SPECIFIED POSTPROCEDURAL STATES: Chronic | ICD-10-CM

## 2022-01-11 DIAGNOSIS — Z87.39 PERSONAL HISTORY OF OTHER DISEASES OF THE MUSCULOSKELETAL SYSTEM AND CONNECTIVE TISSUE: Chronic | ICD-10-CM

## 2022-01-11 DIAGNOSIS — Z90.89 ACQUIRED ABSENCE OF OTHER ORGANS: Chronic | ICD-10-CM

## 2022-01-11 DIAGNOSIS — Z90.710 ACQUIRED ABSENCE OF BOTH CERVIX AND UTERUS: Chronic | ICD-10-CM

## 2022-01-11 DIAGNOSIS — T14.90XA INJURY, UNSPECIFIED, INITIAL ENCOUNTER: Chronic | ICD-10-CM

## 2022-01-11 DIAGNOSIS — Z13.820 ENCOUNTER FOR SCREENING FOR OSTEOPOROSIS: ICD-10-CM

## 2022-01-11 DIAGNOSIS — Z90.49 ACQUIRED ABSENCE OF OTHER SPECIFIED PARTS OF DIGESTIVE TRACT: Chronic | ICD-10-CM

## 2022-01-11 PROCEDURE — 77080 DXA BONE DENSITY AXIAL: CPT

## 2022-01-11 PROCEDURE — 77080 DXA BONE DENSITY AXIAL: CPT | Mod: 26

## 2022-01-14 LAB
INR PPP: 2.1
INR PPP: 2.4

## 2022-01-18 ENCOUNTER — APPOINTMENT (OUTPATIENT)
Dept: CARDIOLOGY | Facility: CLINIC | Age: 77
End: 2022-01-18
Payer: MEDICARE

## 2022-01-18 ENCOUNTER — RX RENEWAL (OUTPATIENT)
Age: 77
End: 2022-01-18

## 2022-01-18 PROCEDURE — 93015 CV STRESS TEST SUPVJ I&R: CPT

## 2022-01-18 PROCEDURE — 78452 HT MUSCLE IMAGE SPECT MULT: CPT

## 2022-01-18 PROCEDURE — A9500: CPT

## 2022-01-19 ENCOUNTER — APPOINTMENT (OUTPATIENT)
Dept: FAMILY MEDICINE | Facility: CLINIC | Age: 77
End: 2022-01-19
Payer: MEDICARE

## 2022-01-19 VITALS
DIASTOLIC BLOOD PRESSURE: 80 MMHG | HEIGHT: 62 IN | OXYGEN SATURATION: 97 % | WEIGHT: 200 LBS | BODY MASS INDEX: 36.8 KG/M2 | HEART RATE: 80 BPM | TEMPERATURE: 98 F | SYSTOLIC BLOOD PRESSURE: 128 MMHG | RESPIRATION RATE: 15 BRPM

## 2022-01-19 DIAGNOSIS — I87.2 VENOUS INSUFFICIENCY (CHRONIC) (PERIPHERAL): ICD-10-CM

## 2022-01-19 DIAGNOSIS — R42 DIZZINESS AND GIDDINESS: ICD-10-CM

## 2022-01-19 PROCEDURE — 99214 OFFICE O/P EST MOD 30 MIN: CPT

## 2022-01-19 NOTE — REVIEW OF SYSTEMS
[Itching] : Itching [Headache] : no headache [Dizziness] : dizziness [Fainting] : no fainting [Confusion] : no confusion [Memory Loss] : no memory loss [Unsteady Walking] : no ataxia [Negative] : Heme/Lymph [de-identified] : lower extremity edema

## 2022-01-19 NOTE — PHYSICAL EXAM
[No Acute Distress] : no acute distress [EOMI] : extraocular movements intact [Supple] : supple [Clear to Auscultation] : lungs were clear to auscultation bilaterally [Normal S1, S2] : normal S1 and S2 [No Edema] : there was no peripheral edema [Non Tender] : non-tender [No Rash] : no rash [Normal Gait] : normal gait [Normal Affect] : the affect was normal [de-identified] : systolic ejection murmur [de-identified] : lower extremity excoriation marking, mild erythema, mild edema, chronic stasis changes

## 2022-01-19 NOTE — ASSESSMENT
[FreeTextEntry1] : c/w with follow up with cardiology\par will provide one referral to neurology for evaluation\par possible worsened UTI, possible for dizziness, now treated\par increase hydration, hygiene\par If worsened symptoms return for reevaluation\par stasis dermatitis, diuretic, triamcinolone cream apply as needed.

## 2022-01-19 NOTE — HISTORY OF PRESENT ILLNESS
[FreeTextEntry8] : dizziness, recent UTI, itchy lower extremity\par Ms Zoila Wallace is a 77 yo female presents today for persistent dizziness. Pt reports hx of recent UTI, which was left untreated for over 10 days when she was out of state, and was unable to start her antibiotics in time. Pt reports symptoms worsened with dysuria, frequency, back pain, and then dizziness. Pt reports following return to [place] she picked up her antibiotics started them and also increased hydration. Pt reports today the unsteady dizziness persists however, but improved since before. Pt also reports more weight loss since past around 240s, now 200s, advised to also keep a home bp log, especially when she feels dizziness. Currently pt, managed on 3 antihypertensive agents. Pt reports upon feeling dizziness, feel mental fogginess, near syncope, visual disturbances. Pt recommended today also getting an evaluation from neurology, referral provided today. Pt reports pruritus of lower extremity especially at night. Pt has hx of chronic edema, and early signs of stasis dermatitis, advised today will treat with mid potency steroid cream.

## 2022-01-24 NOTE — PATIENT PROFILE ADULT - FALL HARM RISK CONCLUSION
COVID Follow-up Call    Situation: This patient triggered as a low risk positive COVID-19 after a recent clinical encounter. A care transitions call occurred and is summarized below.    Background:  Admit 1/11-1/22 risk 14% Dx: Covid PNA, Acute hypoxic resp failure     Assessment:   Covid-19 Symptom Assessment  Covid-19 Case Type: Positive Covid-19 Test ([facility])  Date of Covid-19 Symptom Onset: 01/11/22  Current Symptoms: Weakness, Other (comment) (lightheadness, dizzy)  Symptom Change Since Last Assessment: First assessment  Self-Isolation Status (See Link in Sidebar): Self-isolation not necessary    Additional topics reviewed with patient:   · Since last virtual visit, patient is feeling better. Discuss eating healthy and keeping herself hydrated. Patient will see her PCP tomorrow.   · Patient is taking all medications as prescribed  · Patient did not have questions or concerns regarding the medications prescribed    Recommendation:  Reinforced patient instructions provided by v-visit/ED provider, including continued self-monitoring of symptoms and self-quarantine.  Patient verbalized understanding to contact PCP for worsening symptoms.    Next follow up call scheduled for 1/31/22        
Fall Risk

## 2022-01-25 ENCOUNTER — APPOINTMENT (OUTPATIENT)
Age: 77
End: 2022-01-25

## 2022-02-02 LAB — INR PPP: 2.3

## 2022-02-16 ENCOUNTER — EMERGENCY (EMERGENCY)
Facility: HOSPITAL | Age: 77
LOS: 1 days | Discharge: HOME | End: 2022-02-16
Attending: EMERGENCY MEDICINE
Payer: COMMERCIAL

## 2022-02-16 VITALS — TEMPERATURE: 208 F | DIASTOLIC BLOOD PRESSURE: 78 MMHG | HEART RATE: 65 BPM | SYSTOLIC BLOOD PRESSURE: 147 MMHG

## 2022-02-16 VITALS — WEIGHT: 199.08 LBS | HEIGHT: 62 IN

## 2022-02-16 DIAGNOSIS — Z90.49 ACQUIRED ABSENCE OF OTHER SPECIFIED PARTS OF DIGESTIVE TRACT: Chronic | ICD-10-CM

## 2022-02-16 DIAGNOSIS — Z87.39 PERSONAL HISTORY OF OTHER DISEASES OF THE MUSCULOSKELETAL SYSTEM AND CONNECTIVE TISSUE: Chronic | ICD-10-CM

## 2022-02-16 DIAGNOSIS — T14.90XA INJURY, UNSPECIFIED, INITIAL ENCOUNTER: Chronic | ICD-10-CM

## 2022-02-16 DIAGNOSIS — Z90.710 ACQUIRED ABSENCE OF BOTH CERVIX AND UTERUS: Chronic | ICD-10-CM

## 2022-02-16 DIAGNOSIS — Z98.890 OTHER SPECIFIED POSTPROCEDURAL STATES: Chronic | ICD-10-CM

## 2022-02-16 DIAGNOSIS — Z90.89 ACQUIRED ABSENCE OF OTHER ORGANS: Chronic | ICD-10-CM

## 2022-02-16 LAB
ALBUMIN SERPL ELPH-MCNC: 4.6 G/DL — SIGNIFICANT CHANGE UP (ref 3.3–5)
ALP SERPL-CCNC: 52 U/L — SIGNIFICANT CHANGE UP (ref 40–120)
ALT FLD-CCNC: 26 U/L — SIGNIFICANT CHANGE UP (ref 10–45)
ANION GAP SERPL CALC-SCNC: 14 MMOL/L — SIGNIFICANT CHANGE UP (ref 5–17)
APTT BLD: 38.8 SEC — HIGH (ref 27.5–35.5)
AST SERPL-CCNC: 24 U/L — SIGNIFICANT CHANGE UP (ref 10–40)
BILIRUB SERPL-MCNC: 0.2 MG/DL — SIGNIFICANT CHANGE UP (ref 0.2–1.2)
BLD GP AB SCN SERPL QL: NEGATIVE — SIGNIFICANT CHANGE UP
BUN SERPL-MCNC: 37 MG/DL — HIGH (ref 7–23)
CALCIUM SERPL-MCNC: 9.9 MG/DL — SIGNIFICANT CHANGE UP (ref 8.4–10.5)
CHLORIDE SERPL-SCNC: 104 MMOL/L — SIGNIFICANT CHANGE UP (ref 96–108)
CO2 SERPL-SCNC: 24 MMOL/L — SIGNIFICANT CHANGE UP (ref 22–31)
CREAT SERPL-MCNC: 0.96 MG/DL — SIGNIFICANT CHANGE UP (ref 0.5–1.3)
GLUCOSE SERPL-MCNC: 140 MG/DL — HIGH (ref 70–99)
HCT VFR BLD CALC: 36.8 % — SIGNIFICANT CHANGE UP (ref 34.5–45)
HGB BLD-MCNC: 12.1 G/DL — SIGNIFICANT CHANGE UP (ref 11.5–15.5)
INR BLD: 1.85 RATIO — HIGH (ref 0.88–1.16)
INR PPP: 1.9
LIDOCAIN IGE QN: 52 U/L — SIGNIFICANT CHANGE UP (ref 7–60)
MCHC RBC-ENTMCNC: 30.3 PG — SIGNIFICANT CHANGE UP (ref 27–34)
MCHC RBC-ENTMCNC: 32.9 GM/DL — SIGNIFICANT CHANGE UP (ref 32–36)
MCV RBC AUTO: 92 FL — SIGNIFICANT CHANGE UP (ref 80–100)
NRBC # BLD: 0 /100 WBCS — SIGNIFICANT CHANGE UP (ref 0–0)
PLATELET # BLD AUTO: 220 K/UL — SIGNIFICANT CHANGE UP (ref 150–400)
POTASSIUM SERPL-MCNC: 4.6 MMOL/L — SIGNIFICANT CHANGE UP (ref 3.5–5.3)
POTASSIUM SERPL-SCNC: 4.6 MMOL/L — SIGNIFICANT CHANGE UP (ref 3.5–5.3)
PROT SERPL-MCNC: 7.7 G/DL — SIGNIFICANT CHANGE UP (ref 6–8.3)
PROTHROM AB SERPL-ACNC: 21.6 SEC — HIGH (ref 10.6–13.6)
RBC # BLD: 4 M/UL — SIGNIFICANT CHANGE UP (ref 3.8–5.2)
RBC # FLD: 13.6 % — SIGNIFICANT CHANGE UP (ref 10.3–14.5)
RH IG SCN BLD-IMP: POSITIVE — SIGNIFICANT CHANGE UP
SODIUM SERPL-SCNC: 142 MMOL/L — SIGNIFICANT CHANGE UP (ref 135–145)
WBC # BLD: 7.26 K/UL — SIGNIFICANT CHANGE UP (ref 3.8–10.5)
WBC # FLD AUTO: 7.26 K/UL — SIGNIFICANT CHANGE UP (ref 3.8–10.5)

## 2022-02-16 PROCEDURE — 71045 X-RAY EXAM CHEST 1 VIEW: CPT

## 2022-02-16 PROCEDURE — 86900 BLOOD TYPING SEROLOGIC ABO: CPT

## 2022-02-16 PROCEDURE — 71250 CT THORAX DX C-: CPT | Mod: 26,MA

## 2022-02-16 PROCEDURE — 72125 CT NECK SPINE W/O DYE: CPT | Mod: MA

## 2022-02-16 PROCEDURE — 86901 BLOOD TYPING SEROLOGIC RH(D): CPT

## 2022-02-16 PROCEDURE — 80053 COMPREHEN METABOLIC PANEL: CPT

## 2022-02-16 PROCEDURE — 70450 CT HEAD/BRAIN W/O DYE: CPT | Mod: MA

## 2022-02-16 PROCEDURE — 72125 CT NECK SPINE W/O DYE: CPT | Mod: 26,MA

## 2022-02-16 PROCEDURE — 71250 CT THORAX DX C-: CPT | Mod: MA

## 2022-02-16 PROCEDURE — 74176 CT ABD & PELVIS W/O CONTRAST: CPT | Mod: 26,MA

## 2022-02-16 PROCEDURE — 85027 COMPLETE CBC AUTOMATED: CPT

## 2022-02-16 PROCEDURE — 72128 CT CHEST SPINE W/O DYE: CPT | Mod: 26,MA

## 2022-02-16 PROCEDURE — 74176 CT ABD & PELVIS W/O CONTRAST: CPT | Mod: MA

## 2022-02-16 PROCEDURE — 96374 THER/PROPH/DIAG INJ IV PUSH: CPT

## 2022-02-16 PROCEDURE — 85610 PROTHROMBIN TIME: CPT

## 2022-02-16 PROCEDURE — 99285 EMERGENCY DEPT VISIT HI MDM: CPT

## 2022-02-16 PROCEDURE — 70450 CT HEAD/BRAIN W/O DYE: CPT | Mod: 26,MA

## 2022-02-16 PROCEDURE — 72131 CT LUMBAR SPINE W/O DYE: CPT | Mod: 26,MA

## 2022-02-16 PROCEDURE — 71045 X-RAY EXAM CHEST 1 VIEW: CPT | Mod: 26

## 2022-02-16 PROCEDURE — 36415 COLL VENOUS BLD VENIPUNCTURE: CPT

## 2022-02-16 PROCEDURE — 99284 EMERGENCY DEPT VISIT MOD MDM: CPT | Mod: 25

## 2022-02-16 PROCEDURE — 83690 ASSAY OF LIPASE: CPT

## 2022-02-16 PROCEDURE — 85730 THROMBOPLASTIN TIME PARTIAL: CPT

## 2022-02-16 PROCEDURE — 86850 RBC ANTIBODY SCREEN: CPT

## 2022-02-16 RX ORDER — MORPHINE SULFATE 50 MG/1
4 CAPSULE, EXTENDED RELEASE ORAL ONCE
Refills: 0 | Status: DISCONTINUED | OUTPATIENT
Start: 2022-02-16 | End: 2022-02-16

## 2022-02-16 RX ADMIN — MORPHINE SULFATE 4 MILLIGRAM(S): 50 CAPSULE, EXTENDED RELEASE ORAL at 15:25
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
## 2022-02-16 NOTE — ED PROVIDER NOTE - NEUROLOGICAL, MLM
Alert and oriented, no focal deficits. Moving all extremities. Strength and sensation of upper and lower extremities intact

## 2022-02-16 NOTE — ED ADULT NURSE NOTE - NSIMPLEMENTINTERV_GEN_ALL_ED
Patient/EMS
Implemented All Fall with Harm Risk Interventions:  [place] to call system. Call bell, personal items and telephone within reach. Instruct patient to call for assistance. Room bathroom lighting operational. Non-slip footwear when patient is off stretcher. Physically safe environment: no spills, clutter or unnecessary equipment. Stretcher in lowest position, wheels locked, appropriate side rails in place. Provide visual cue, wrist band, yellow gown, etc. Monitor gait and stability. Monitor for mental status changes and reorient to person, place, and time. Review medications for side effects contributing to fall risk. Reinforce activity limits and safety measures with patient and family. Provide visual clues: red socks.
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
## 2022-02-16 NOTE — ED PROVIDER NOTE - PATIENT PORTAL LINK FT
You can access the FollowMyHealth Patient Portal offered by [facility] by registering at the following website: [email or web address]. By joining [employer] FollowMyHealth portal, you will also be able to view your health information using other applications (apps) compatible with our system.

## 2022-02-16 NOTE — ED ADULT NURSE NOTE - PRIMARY CARE PROVIDER
Assessment/Treatment Plan:      Frandy Workman is a 53 year old year old male with    1. Type 2 Diabetes since early 30s with hyperglycemia and neuropathy.     Regmen  Tresiba 110 units daily   Novolog 1 unit per 4 gm carb plus 2 unit per 25 over 100 (start at meals again)   Farxiga 10 mg daily (restart)    A1c today was 7.6 but has hyperglycemia, A1c seems falsely lower than the BG levels.   A1c is not a reliable indicator of glycemic control: Anemia, does not correlate with BG levels.     Barriers: Stress eating ( having issues with getting her 16 year old daughter to live with him.)  Asked to bolus with meals and snack.     No using prandial aspart  BG testing 3 times a day, with meal time and correction insulin   He will restart doing this    Ran out of Farxiga.    Once refilled he will start using it again.   Had not used it for 2 weeks, and using every other day for months.     Hyperlipidemia Discontinue Simvastatin.   Change to Pravastatin 20 mg daily   Check LDL on follow up.     Jennifer Wilcox MD  [id number]  Endocrinology Service        =================================================    Endocrinology Clinic Visit    Chief Complaint: RECHECK (F/U TYPE II DM)       Subjective:         HPI: Frandy Workman is a 53 year old year old male with history of ESTRADA with liver cirrhosis who is seen in follow up for T2DM - uncontrolled. He moved from [place] to [place] to be close to his DTR and his BG has been uncontrolled now.     He has had diabetes since 2001. He was intially started on Metformin but after diagnosis of liver failure, this was discontinued.     Neuropathy - tingling in feet bilateral   Nephropathy - none  Retinopathy - no per patient (exam 2016)   Hypoglycemia - none    Prevention  On Statin.     Has polyphagia, no polydipsia or polyuria.   No change in Bowel habits. On lactulose   Appetite increased since moving to [place]  Weight gain  No neck pain or difficulty swallowing.     Barriers: He has overcome  unkwn most of his barriers.   A1c is not a reliable indicator of glycemic control: Anemia, does not correlate with BG levels.   BG is high all day long: diet with high carb in it. He avoided snacking and lunches  ?? Poor absorption of Lantus high higher dose. -- This was changed to Tresiba  Lack of oral medication use due to liver cirrhosis. Responded well to Tresiba      Download from her meter shows:  Average 263, sd 86  Recent values mostly high 200s or low 300s in am, improved later in the day.       Insulin regimen  Tresiba 110 units daily  Aspart 1 units per 4 gm CHO (not doing this at lunch because he thought he did not need it)   Correction two units per 25 mg/dL over 100.    Exercise  He is walking 3-6 months daily.         Allergies   Allergen Reactions     Codeine Other (See Comments)     Cannot take due to liver         Current Outpatient Prescriptions   Medication Sig Dispense Refill     blood glucose monitoring (ACCU-CHEK EDINSON PLUS) test strip Use to test blood sugar 4 times daily 400 each 3     blood glucose monitoring (ACCU-CHEK FASTCLIX) lancets Use to test blood sugar 4 times daily or as directed.  1 box = 102 lancets 408 each 3     rifaximin (XIFAXAN) 550 MG TABS tablet Take 1 tablet (550 mg) by mouth 2 times daily 60 tablet 11     lactulose (CHRONULAC) 10 GM/15ML solution Take 45 mLs (30 g) by mouth 4 times daily 7200 mL 11     dapagliflozin (FARXIGA) 10 MG TABS tablet Take 1 tablet (10 mg) by mouth daily 90 tablet 3     insulin degludec (TRESIBA) 200 UNIT/ML pen Take 120 units daily. 36 mL 3     spironolactone (ALDACTONE) 25 MG tablet Take 1 tablet (25 mg) by mouth daily 90 tablet 1     insulin pen needle (BD VIKTORIA U/F) 32G X 4 MM Use as directed. 100 each 11     carvedilol (COREG) 12.5 MG tablet Take 1 tablet (12.5 mg) by mouth 2 times daily (with meals) 180 tablet 3     simvastatin (ZOCOR) 20 MG tablet Take 1 tablet (20 mg) by mouth At Bedtime 90 tablet 3     aspirin (ASPIRIN LOW DOSE) 81 MG EC  tablet Take 1 tablet (81 mg) by mouth daily 90 tablet 3     omeprazole (PRILOSEC) 40 MG capsule Take 1 capsule (40 mg) by mouth daily 90 capsule 2     OLANZapine (ZYPREXA) 2.5 MG tablet Take 1 tablet (2.5 mg) by mouth At Bedtime 30 tablet 5     potassium chloride SA (K-DUR/KLOR-CON M) 20 MEQ CR tablet Take 1 tablet (20 mEq) by mouth daily 90 tablet 3     Cholecalciferol (VITAMIN D-3 PO) Take 2,000 Units by mouth       insulin aspart (NOVOLOG FLEXPEN) 100 UNIT/ML injection 1 unit per 4 Gms CHO at meals and snacks + correction scale of 1 unit per 25 mg/dL over 125. Average daily dose is 75 units. 24 mL 11     cyanocobalamin (RA VITAMIN B-12 TR) 1000 MCG TBCR Take 1,000 mcg by mouth daily       Multiple Vitamin (THERAVITE PO) Take 1 tablet by mouth daily         Review of Systems   No eye symptoms  No headache, change in vision, loss of peripheral vision  No neck pain, no other lumps in the neck  No change in breathing    No change in swallowing.    No swelling in extremities  No change in mental status.    Other ROS that were obtained were negative.         Past Medical History:   Diagnosis Date     Anemia 2013    Low blood plates current is 37     BPH (benign prostatic hyperplasia)      Cholelithiasis      Conductive hearing loss 8/16/2017    Have a lump on my right side of my face.  Had wax discharge     Depressive disorder 1986    Suffer effects throughout life     Gastroesophageal reflux disease 12/1/2014    Being treated with Prilosac     Hepatitis 2014    Diagnosed with schrosis ESTRADA in 2014.  Suffer from hepatatie     Hyperlipidemia      Liver cirrhosis secondary to ESTRADA (H)      Type II diabetes mellitus (H)        Past Surgical History:   Procedure Laterality Date     ESOPHAGOSCOPY, GASTROSCOPY, DUODENOSCOPY (EGD), COMBINED N/A 11/17/2016    Procedure: COMBINED ESOPHAGOSCOPY, GASTROSCOPY, DUODENOSCOPY (EGD);  Surgeon: Santi Rosas MD;  Location:  GI     KNEE SURGERY Left        Family History  "  Problem Relation Age of Onset     Prostate Cancer Maternal Grandfather      Substance Abuse Maternal Grandfather      Alcohol     Colon Cancer Father 60     Pancreatic Cancer Father 60     Prostate Cancer Father      Colorectal Cancer Father      Macular Degeneration Father      CANCER Father      Colorectal Cancer Maternal Grandmother      CANCER Maternal Grandmother      Substance Abuse Maternal Grandmother      Alcohol     Colorectal Cancer Paternal Grandmother      CANCER Mother      DIABETES Mother       3/2016     CEREBROVASCULAR DISEASE Mother      Passed away in Feb of this year, 80 years old.     Thyroid Disease Mother      Depression Mother      Asthma Sister      Had since birth     Thyroid Disease Sister      Depression Sister      Liver Disease No family hx of        Social History     Social History     Marital Status:      Spouse Name: N/A     Number of Children: N/A     Years of Education: N/A     Social History Main Topics     Smoking status: Never Smoker      Smokeless tobacco: Current User      Comment: 1 tin per week     Alcohol Use: No      Comment: quit 1996     Drug Use: No     Sexual Activity: Not Asked     Other Topics Concern     None     Social History Narrative       Objective:   /72  Pulse 78  Ht 1.753 m (5' 9\")  Wt 86.2 kg (190 lb)  BMI 28.06 kg/m2  Constitutional: Appears well-developed and well-nourished. Active.   EYES: anicteric, normal extra-ocular movements, no lid lag or retraction   HEENT: Mouth/Throat: Mucous membrane is moist. Oropharynx is clear. No adenopathy. Normal thyroid   Cardiovascular: RRR, S1, S2 normal. No m/g/r   Pulmonary/Chest: CTAB. No wheezing or rales   Abdominal: +BS. Distended.   Neurological: Alert.  Extremities: No clubbing, cyanosis or inflammation   Skin: normal texture, color  Feet: Tingling +. Sensation is intact.   Lymphatic: no cervical lymphadenopathy.  Psychological: appropriate mood     In House Labs:   Lab Results "   Component Value Date    A1C 6.5 06/09/2017    A1C 7.8 10/25/2016          TSH   Date Value Ref Range Status   06/09/2017 0.42 0.40 - 4.00 mU/L Final       Creatinine   Date Value Ref Range Status   10/16/2017 0.94 0.66 - 1.25 mg/dL Final   ]    Recent Labs   Lab Test  10/25/16   1731   CHOL  164   HDL  33*   LDL  90   TRIG  205*

## 2022-02-16 NOTE — ED PROVIDER NOTE - MUSCULOSKELETAL, MLM
+midline TTP of thoracic spine. No step offs or deformities. No TTP of cervical or lumbar spine. Neck is supple and nontender. +midline TTP of thoracic spine. No step offs or deformities. No TTP of cervical or lumbar spine. Neck is supple and nontender. Minimal TTP of anterior chest wall, no ecchymosis noted.

## 2022-02-16 NOTE — ED PROVIDER NOTE - SKIN, MLM
Skin normal color for race, warm, dry and intact. No evidence of rash. No ecchymosis, abrasions or lacerations noted.

## 2022-02-17 NOTE — ED POST DISCHARGE NOTE - NS ED POST DC CALL 1
Attempted, left message I will START or STAY ON the medications listed below when I get home from the hospital:  None

## 2022-02-17 NOTE — ED POST DISCHARGE NOTE - DETAILS
Left Vm for call back.  Per note, patient without abd pain and s/p cholecystectomy.  Will need follow up.  -Jcarlos Hinton PA-C Spoke to patient and informed of results. Pt denies abdominal pain, she is s/p cholecystectomy. Advised to f/up to ensure nodule stability. Pt endorses understanding and is appreciative of call - Bouchra Khoury PA-C

## 2022-02-22 ENCOUNTER — APPOINTMENT (OUTPATIENT)
Dept: FAMILY MEDICINE | Facility: CLINIC | Age: 77
End: 2022-02-22
Payer: COMMERCIAL

## 2022-02-22 VITALS
SYSTOLIC BLOOD PRESSURE: 124 MMHG | HEIGHT: 62 IN | RESPIRATION RATE: 15 BRPM | OXYGEN SATURATION: 98 % | DIASTOLIC BLOOD PRESSURE: 80 MMHG | WEIGHT: 210 LBS | HEART RATE: 68 BPM | BODY MASS INDEX: 38.64 KG/M2 | TEMPERATURE: 96.8 F

## 2022-02-22 DIAGNOSIS — S13.4XXA SPRAIN OF LIGAMENTS OF CERVICAL SPINE, INITIAL ENCOUNTER: ICD-10-CM

## 2022-02-22 DIAGNOSIS — M62.830 MUSCLE SPASM OF BACK: ICD-10-CM

## 2022-02-22 PROCEDURE — 99214 OFFICE O/P EST MOD 30 MIN: CPT

## 2022-02-22 PROCEDURE — 99072 ADDL SUPL MATRL&STAF TM PHE: CPT

## 2022-02-22 NOTE — PHYSICAL EXAM
[No Acute Distress] : no acute distress [EOMI] : extraocular movements intact [Supple] : supple [Clear to Auscultation] : lungs were clear to auscultation bilaterally [Normal S1, S2] : normal S1 and S2 [No Edema] : there was no peripheral edema [Non Tender] : non-tender [No Rash] : no rash [Normal Gait] : normal gait [Normal Affect] : the affect was normal [de-identified] : systolic ejection murmur [de-identified] : paraspinal muscle hypertonicity, pain with rom of head, and upper back

## 2022-02-22 NOTE — REVIEW OF SYSTEMS
[Joint Pain] : joint pain [Muscle Pain] : muscle pain [Back Pain] : back pain [Headache] : headache [Negative] : Heme/Lymph [Itching] : no itching [Dizziness] : no dizziness [Fainting] : no fainting [Confusion] : no confusion [Memory Loss] : no memory loss [Unsteady Walking] : no ataxia

## 2022-02-22 NOTE — ASSESSMENT
[FreeTextEntry1] : physical therapy provided today\par recommended no strenuous activities\par gentle motion exercises\par f/u with PMD: Dr. Faustin\par regarding the pulmonary nodules and enlarged CBD.
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
## 2022-03-07 ENCOUNTER — RX RENEWAL (OUTPATIENT)
Age: 77
End: 2022-03-07

## 2022-03-09 ENCOUNTER — RX RENEWAL (OUTPATIENT)
Age: 77
End: 2022-03-09

## 2022-03-14 ENCOUNTER — RESULT REVIEW (OUTPATIENT)
Age: 77
End: 2022-03-14

## 2022-03-14 ENCOUNTER — APPOINTMENT (OUTPATIENT)
Dept: RADIOLOGY | Facility: HOSPITAL | Age: 77
End: 2022-03-14

## 2022-03-14 ENCOUNTER — OUTPATIENT (OUTPATIENT)
Dept: OUTPATIENT SERVICES | Facility: HOSPITAL | Age: 77
LOS: 1 days | End: 2022-03-14
Payer: COMMERCIAL

## 2022-03-14 ENCOUNTER — APPOINTMENT (OUTPATIENT)
Dept: FAMILY MEDICINE | Facility: CLINIC | Age: 77
End: 2022-03-14
Payer: COMMERCIAL

## 2022-03-14 VITALS
TEMPERATURE: 97.8 F | HEART RATE: 71 BPM | BODY MASS INDEX: 38.46 KG/M2 | RESPIRATION RATE: 16 BRPM | DIASTOLIC BLOOD PRESSURE: 76 MMHG | SYSTOLIC BLOOD PRESSURE: 145 MMHG | OXYGEN SATURATION: 97 % | HEIGHT: 62 IN | WEIGHT: 209 LBS

## 2022-03-14 DIAGNOSIS — E78.1 PURE HYPERGLYCERIDEMIA: ICD-10-CM
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
## 2022-03-14 DIAGNOSIS — Z90.89 ACQUIRED ABSENCE OF OTHER ORGANS: Chronic | ICD-10-CM
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
## 2022-03-14 DIAGNOSIS — Z90.710 ACQUIRED ABSENCE OF BOTH CERVIX AND UTERUS: Chronic | ICD-10-CM

## 2022-03-14 DIAGNOSIS — M79.641 PAIN IN RIGHT HAND: ICD-10-CM

## 2022-03-14 DIAGNOSIS — Z90.49 ACQUIRED ABSENCE OF OTHER SPECIFIED PARTS OF DIGESTIVE TRACT: Chronic | ICD-10-CM

## 2022-03-14 DIAGNOSIS — R80.9 PROTEINURIA, UNSPECIFIED: ICD-10-CM

## 2022-03-14 DIAGNOSIS — M25.539 PAIN IN UNSPECIFIED WRIST: ICD-10-CM

## 2022-03-14 DIAGNOSIS — T14.90XA INJURY, UNSPECIFIED, INITIAL ENCOUNTER: Chronic | ICD-10-CM
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
## 2022-03-14 DIAGNOSIS — Z98.890 OTHER SPECIFIED POSTPROCEDURAL STATES: Chronic | ICD-10-CM

## 2022-03-14 DIAGNOSIS — Z87.39 PERSONAL HISTORY OF OTHER DISEASES OF THE MUSCULOSKELETAL SYSTEM AND CONNECTIVE TISSUE: Chronic | ICD-10-CM

## 2022-03-14 DIAGNOSIS — M25.531 PAIN IN RIGHT WRIST: ICD-10-CM

## 2022-03-14 DIAGNOSIS — M79.644 PAIN IN RIGHT FINGER(S): ICD-10-CM

## 2022-03-14 PROCEDURE — 73130 X-RAY EXAM OF HAND: CPT | Mod: 26,RT

## 2022-03-14 PROCEDURE — 73130 X-RAY EXAM OF HAND: CPT

## 2022-03-14 PROCEDURE — 99214 OFFICE O/P EST MOD 30 MIN: CPT

## 2022-03-14 PROCEDURE — 73110 X-RAY EXAM OF WRIST: CPT

## 2022-03-14 PROCEDURE — 99072 ADDL SUPL MATRL&STAF TM PHE: CPT

## 2022-03-14 PROCEDURE — 73110 X-RAY EXAM OF WRIST: CPT | Mod: 26,RT

## 2022-03-14 RX ORDER — CIPROFLOXACIN HYDROCHLORIDE 250 MG/1
250 TABLET, FILM COATED ORAL
Qty: 14 | Refills: 0 | Status: DISCONTINUED | COMMUNITY
Start: 2021-12-15 | End: 2022-03-14

## 2022-03-14 NOTE — COUNSELING
[Engage in a relaxing activity] : Engage in a relaxing activity [None] : None [Needs reinforcement, provided] : Patient needs reinforcement on understanding of lifestyle changes and steps needed to achieve self management goal; reinforcement was provided

## 2022-03-15 DIAGNOSIS — I99.8 OTHER DISORDER OF CIRCULATORY SYSTEM: ICD-10-CM
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
## 2022-03-15 NOTE — PLAN
[FreeTextEntry1] : LABS REVIEWED.Xrays and orthopedic referral.\par  Ice.\par  pt. declined pain medicine. \par  Healthy DIET. \par she will make another apt. to address incidental lung nodule and Biliary dilation. wears eyeglasses

## 2022-03-15 NOTE — PHYSICAL EXAM
[No Acute Distress] : no acute distress [Well Nourished] : well nourished [Well Developed] : well developed [Well-Appearing] : well-appearing [Normal Sclera/Conjunctiva] : normal sclera/conjunctiva [PERRL] : pupils equal round and reactive to light [EOMI] : extraocular movements intact [Normal Outer Ear/Nose] : the outer ears and nose were normal in appearance [Supple] : supple [No Respiratory Distress] : no respiratory distress  [No Accessory Muscle Use] : no accessory muscle use [Clear to Auscultation] : lungs were clear to auscultation bilaterally [Normal Rate] : normal rate  [Regular Rhythm] : with a regular rhythm [Normal S1, S2] : normal S1 and S2 [No Murmur] : no murmur heard [No Edema] : there was no peripheral edema [Normal Posterior Cervical Nodes] : no posterior cervical lymphadenopathy [Normal Anterior Cervical Nodes] : no anterior cervical lymphadenopathy [No CVA Tenderness] : no CVA  tenderness [No Spinal Tenderness] : no spinal tenderness [No Joint Swelling] : no joint swelling [Grossly Normal Strength/Tone] : grossly normal strength/tone [No Rash] : no rash [Coordination Grossly Intact] : coordination grossly intact [No Focal Deficits] : no focal deficits [Normal Gait] : normal gait [Deep Tendon Reflexes (DTR)] : deep tendon reflexes were 2+ and symmetric [Normal Affect] : the affect was normal [Normal Insight/Judgement] : insight and judgment were intact [de-identified] : right thumb radial area discomfort in nail bed, PIP pain  right thumb and wrist pain , righh forefinger pain
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
## 2022-03-15 NOTE — HEALTH RISK ASSESSMENT
[Never] : Never [No] : No [No falls in past year] : Patient reported no falls in the past year [de-identified] : REGULAR

## 2022-03-21 ENCOUNTER — APPOINTMENT (OUTPATIENT)
Dept: FAMILY MEDICINE | Facility: CLINIC | Age: 77
End: 2022-03-21
Payer: MEDICARE

## 2022-03-21 VITALS
DIASTOLIC BLOOD PRESSURE: 78 MMHG | OXYGEN SATURATION: 96 % | SYSTOLIC BLOOD PRESSURE: 150 MMHG | RESPIRATION RATE: 16 BRPM | WEIGHT: 212 LBS | HEIGHT: 62 IN | BODY MASS INDEX: 39.01 KG/M2 | TEMPERATURE: 96.9 F | HEART RATE: 81 BPM

## 2022-03-21 DIAGNOSIS — M19.041 PRIMARY OSTEOARTHRITIS, RIGHT HAND: ICD-10-CM

## 2022-03-21 DIAGNOSIS — R91.1 SOLITARY PULMONARY NODULE: ICD-10-CM

## 2022-03-21 DIAGNOSIS — K83.8 OTHER SPECIFIED DISEASES OF BILIARY TRACT: ICD-10-CM

## 2022-03-21 DIAGNOSIS — Z12.11 ENCOUNTER FOR SCREENING FOR MALIGNANT NEOPLASM OF COLON: ICD-10-CM

## 2022-03-21 PROCEDURE — 99215 OFFICE O/P EST HI 40 MIN: CPT
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
## 2022-03-21 NOTE — HEALTH RISK ASSESSMENT
[Never] : Never [No] : In the past 12 months have you used drugs other than those required for medical reasons? No [No falls in past year] : Patient reported no falls in the past year [0] : 2) Feeling down, depressed, or hopeless: Not at all (0) [PHQ-2 Negative - No further assessment needed] : PHQ-2 Negative - No further assessment needed [de-identified] : regular [[id number]] : 0

## 2022-03-21 NOTE — PHYSICAL EXAM
[No Acute Distress] : no acute distress [Well Nourished] : well nourished [Well Developed] : well developed [Well-Appearing] : well-appearing [Normal Sclera/Conjunctiva] : normal sclera/conjunctiva [PERRL] : pupils equal round and reactive to light [EOMI] : extraocular movements intact [Normal Outer Ear/Nose] : the outer ears and nose were normal in appearance [Supple] : supple [No Respiratory Distress] : no respiratory distress  [No Accessory Muscle Use] : no accessory muscle use [Clear to Auscultation] : lungs were clear to auscultation bilaterally [Normal Rate] : normal rate  [Regular Rhythm] : with a regular rhythm [Normal S1, S2] : normal S1 and S2 [No Murmur] : no murmur heard [No Edema] : there was no peripheral edema [Normal Posterior Cervical Nodes] : no posterior cervical lymphadenopathy [Normal Anterior Cervical Nodes] : no anterior cervical lymphadenopathy [No CVA Tenderness] : no CVA  tenderness [No Spinal Tenderness] : no spinal tenderness [No Joint Swelling] : no joint swelling [Grossly Normal Strength/Tone] : grossly normal strength/tone [No Rash] : no rash [Coordination Grossly Intact] : coordination grossly intact [No Focal Deficits] : no focal deficits [Normal Gait] : normal gait [Deep Tendon Reflexes (DTR)] : deep tendon reflexes were 2+ and symmetric [Normal Affect] : the affect was normal [Normal Insight/Judgement] : insight and judgment were intact [de-identified] : right thumb radial area discomfort in nail bed, PIP pain  right thumb and wrist pain , right forefinger pain

## 2022-04-05 ENCOUNTER — APPOINTMENT (OUTPATIENT)
Dept: MRI IMAGING | Facility: HOSPITAL | Age: 77
End: 2022-04-05
Payer: COMMERCIAL

## 2022-04-05 ENCOUNTER — OUTPATIENT (OUTPATIENT)
Dept: OUTPATIENT SERVICES | Facility: HOSPITAL | Age: 77
LOS: 1 days | End: 2022-04-05
Payer: COMMERCIAL

## 2022-04-05 ENCOUNTER — APPOINTMENT (OUTPATIENT)
Dept: ORTHOPEDIC SURGERY | Facility: CLINIC | Age: 77
End: 2022-04-05
Payer: COMMERCIAL

## 2022-04-05 VITALS — HEIGHT: 61 IN | BODY MASS INDEX: 40.02 KG/M2 | WEIGHT: 212 LBS

## 2022-04-05 DIAGNOSIS — T14.90XA INJURY, UNSPECIFIED, INITIAL ENCOUNTER: Chronic | ICD-10-CM

## 2022-04-05 DIAGNOSIS — Z90.89 ACQUIRED ABSENCE OF OTHER ORGANS: Chronic | ICD-10-CM

## 2022-04-05 DIAGNOSIS — Z90.49 ACQUIRED ABSENCE OF OTHER SPECIFIED PARTS OF DIGESTIVE TRACT: Chronic | ICD-10-CM

## 2022-04-05 DIAGNOSIS — Z90.710 ACQUIRED ABSENCE OF BOTH CERVIX AND UTERUS: Chronic | ICD-10-CM

## 2022-04-05 DIAGNOSIS — Z87.39 PERSONAL HISTORY OF OTHER DISEASES OF THE MUSCULOSKELETAL SYSTEM AND CONNECTIVE TISSUE: Chronic | ICD-10-CM

## 2022-04-05 DIAGNOSIS — Z98.890 OTHER SPECIFIED POSTPROCEDURAL STATES: Chronic | ICD-10-CM

## 2022-04-05 DIAGNOSIS — M19.049 PRIMARY OSTEOARTHRITIS, UNSPECIFIED HAND: ICD-10-CM

## 2022-04-05 LAB — INR PPP: 3

## 2022-04-05 PROCEDURE — 99072 ADDL SUPL MATRL&STAF TM PHE: CPT

## 2022-04-05 PROCEDURE — 73218 MRI UPPER EXTREMITY W/O DYE: CPT | Mod: 26,RT

## 2022-04-05 PROCEDURE — 73218 MRI UPPER EXTREMITY W/O DYE: CPT

## 2022-04-05 PROCEDURE — 99203 OFFICE O/P NEW LOW 30 MIN: CPT

## 2022-04-05 NOTE — DISCUSSION/SUMMARY
[FreeTextEntry1] : The underlying pathophysiology was reviewed with the patient. XR films were reviewed with the patient. Discussed at length the nature of the patient’s condition. The right thumb symptoms appear secondary to possible UCL tear.\par \par At this time, I have recommended an MRI for further evaluation given my suspicions of a UCL tear on physical exam.\par An MRI of the right thumb was ordered to evaluate for tear of the UCL of the MP joint Patient will follow-up to review the results and discuss further treatment recommendations.\par \par All questions answered, understanding verbalized. Patient in agreement with plan of care.
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
## 2022-04-05 NOTE — PHYSICAL EXAM
[de-identified] : Patient is WDWN, alert, and in no acute distress. Breathing is unlabored. She is grossly oriented to person, place, and time.\par \par Right Hand (Thumb):\par Gross instability of the UCL to the MP joint of the right thumb.\par Resolved bruising and edema although patient noted she did have extensive bruising and edema immediately after the injury 6 weeks ago. \par Tenderness to palpation along the ulnar aspect of the MP joint \par Limitations of motion to the thumb with pain\par Sensation is normal and intact. [de-identified] : XRAYS FROM 3/14/22 OF THE RIGHT HAND REVIEWED\par 3 VIEWS --> no abnormalities. No acute fracture. No dislocation. Cartilage spaces are maintained.
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
## 2022-04-05 NOTE — END OF VISIT
[FreeTextEntry3] : All medical record entries made by the Scribe were at my,  Dr. Miguel Mercado MD., direction and personally dictated by me on 04/05/2022. I have personally reviewed the chart and agree that the record accurately reflects my personal performance of the history, physical exam, assessment and plan.

## 2022-04-05 NOTE — ADDENDUM
[FreeTextEntry1] : I, Isamar Fox wrote this note acting as a scribe for Dr. Miguel Mercado on Apr 05, 2022.

## 2022-04-07 ENCOUNTER — RX RENEWAL (OUTPATIENT)
Age: 77
End: 2022-04-07

## 2022-04-07 DIAGNOSIS — E87.6 HYPOKALEMIA: ICD-10-CM

## 2022-04-12 ENCOUNTER — APPOINTMENT (OUTPATIENT)
Dept: PULMONOLOGY | Facility: CLINIC | Age: 77
End: 2022-04-12
Payer: MEDICARE

## 2022-04-12 VITALS
HEIGHT: 61 IN | BODY MASS INDEX: 39.65 KG/M2 | TEMPERATURE: 96.4 F | OXYGEN SATURATION: 97 % | DIASTOLIC BLOOD PRESSURE: 80 MMHG | HEART RATE: 66 BPM | WEIGHT: 210 LBS | SYSTOLIC BLOOD PRESSURE: 150 MMHG

## 2022-04-12 DIAGNOSIS — R93.89 ABNORMAL FINDINGS ON DIAGNOSTIC IMAGING OF OTHER SPECIFIED BODY STRUCTURES: ICD-10-CM

## 2022-04-12 DIAGNOSIS — G47.30 SLEEP APNEA, UNSPECIFIED: ICD-10-CM

## 2022-04-12 DIAGNOSIS — Z85.3 PERSONAL HISTORY OF MALIGNANT NEOPLASM OF BREAST: ICD-10-CM

## 2022-04-12 PROCEDURE — 99204 OFFICE O/P NEW MOD 45 MIN: CPT

## 2022-04-12 NOTE — ASSESSMENT
[FreeTextEntry1] : Patient has scattered small lung nodules.  She is a non-smoker however did have heavy secondhand smoke exposure.  Will order CAT scan for 1 year as per radiology recommendation.  In addition she has history of sleep apnea last tested 10 years ago.  Will order repeat sleep study to verify the existence of sleep apnea and consider for adjustments in therapy.

## 2022-04-19 LAB — INR PPP: 4.2

## 2022-04-21 ENCOUNTER — APPOINTMENT (OUTPATIENT)
Dept: ORTHOPEDIC SURGERY | Facility: CLINIC | Age: 77
End: 2022-04-21
Payer: COMMERCIAL

## 2022-04-21 PROCEDURE — 99213 OFFICE O/P EST LOW 20 MIN: CPT | Mod: 25

## 2022-04-21 PROCEDURE — 29075 APPL CST ELBW FNGR SHORT ARM: CPT | Mod: RT

## 2022-04-21 PROCEDURE — 99072 ADDL SUPL MATRL&STAF TM PHE: CPT

## 2022-04-21 NOTE — DISCUSSION/SUMMARY
[FreeTextEntry1] : The underlying pathophysiology was reviewed with the patient. XR films were reviewed with the patient. Discussed at length the nature of the patient’s condition. The right thumb symptoms appear secondary to possible UCL tear.\par \par Patient advised that they will be casted for a total of 6 weeks. A right thumb spica cast was applied. Proper cast care instructions were reviewed. The patient was instructed to actively work on ROM exercises of the shoulder, elbow, hand and wrist. \par \par All questions answered, understanding verbalized. Patient in agreement with plan of care. Follow up in 6 weeks for cast removal.

## 2022-04-21 NOTE — ADDENDUM
[FreeTextEntry1] : I, Isamar Fox wrote this note acting as a scribe for Dr. Miguel Mercado on Apr 21, 2022.
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
## 2022-04-21 NOTE — END OF VISIT
[FreeTextEntry3] : All medical record entries made by the Scribe were at my,  Dr. Miguel Mercado MD., direction and personally dictated by me on 04/21/2022. I have personally reviewed the chart and agree that the record accurately reflects my personal performance of the history, physical exam, assessment and plan.

## 2022-04-21 NOTE — PHYSICAL EXAM
[de-identified] : Patient is WDWN, alert, and in no acute distress. Breathing is unlabored. She is grossly oriented to person, place, and time.\par \par Right Hand (Thumb):\par Gross instability of the UCL to the MP joint of the right thumb.\par Resolved bruising and edema although patient noted she did have extensive bruising and edema immediately after the injury 6 weeks ago. \par Tenderness to palpation along the ulnar aspect of the MP joint \par Limitations of motion to the thumb with pain\par Sensation is normal and intact.  [de-identified] : EXAM:  MR THUMB RT\par PROCEDURE DATE:  04/05/2022\par IMPRESSION:\par 1.  Low-grade partial-thickness tear of the dorsal attachment of the ulnar collateral ligament of the first MCP joint, as described above.\par 2.  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 Apr 10 2022  2:57PM

## 2022-04-26 ENCOUNTER — RX RENEWAL (OUTPATIENT)
Age: 77
End: 2022-04-26

## 2022-05-10 LAB — INR PPP: 2.1

## 2022-05-11 ENCOUNTER — RX RENEWAL (OUTPATIENT)
Age: 77
End: 2022-05-11

## 2022-05-25 ENCOUNTER — APPOINTMENT (OUTPATIENT)
Dept: CARDIOLOGY | Facility: CLINIC | Age: 77
End: 2022-05-25

## 2022-05-27 ENCOUNTER — APPOINTMENT (OUTPATIENT)
Dept: FAMILY MEDICINE | Facility: CLINIC | Age: 77
End: 2022-05-27
Payer: MEDICARE

## 2022-05-27 VITALS
TEMPERATURE: 97.3 F | SYSTOLIC BLOOD PRESSURE: 144 MMHG | WEIGHT: 218 LBS | HEART RATE: 75 BPM | HEIGHT: 61 IN | DIASTOLIC BLOOD PRESSURE: 71 MMHG | BODY MASS INDEX: 41.16 KG/M2 | RESPIRATION RATE: 14 BRPM | OXYGEN SATURATION: 97 %

## 2022-05-27 DIAGNOSIS — F32.A DEPRESSION, UNSPECIFIED: ICD-10-CM

## 2022-05-27 DIAGNOSIS — M79.641 PAIN IN RIGHT HAND: ICD-10-CM

## 2022-05-27 DIAGNOSIS — Z86.19 PERSONAL HISTORY OF OTHER INFECTIOUS AND PARASITIC DISEASES: ICD-10-CM

## 2022-05-27 DIAGNOSIS — R82.90 UNSPECIFIED ABNORMAL FINDINGS IN URINE: ICD-10-CM

## 2022-05-27 DIAGNOSIS — M54.2 CERVICALGIA: ICD-10-CM

## 2022-05-27 PROCEDURE — 99215 OFFICE O/P EST HI 40 MIN: CPT

## 2022-05-27 RX ORDER — ZOSTER VACCINE RECOMBINANT, ADJUVANTED 50 MCG/0.5
50 KIT INTRAMUSCULAR
Qty: 0.5 | Refills: 1 | Status: ACTIVE | COMMUNITY
Start: 2022-05-27 | End: 1900-01-01

## 2022-05-27 NOTE — HEALTH RISK ASSESSMENT
[With Patient/Caregiver] : , with patient/caregiver [Never] : Never [No] : In the past 12 months have you used drugs other than those required for medical reasons? No [No falls in past year] : Patient reported no falls in the past year [0] : 2) Feeling down, depressed, or hopeless: Not at all (0) [PHQ-2 Negative - No further assessment needed] : PHQ-2 Negative - No further assessment needed [[id number]] : 0 [AdvancecareDate] : 05/22

## 2022-05-27 NOTE — PLAN
[FreeTextEntry1] : certifying physician form for therapeutic shoes filled out. \par  renal diabetic diet. \par  check labs.\par PT referral for neck and back pain and right hand pain .\par f/u cardiology re: h/o coronary stents and f/u hematology re: Factor 5 Leiden. once cleared pt. will schedule colonoscopy. She is going to schedule apt. for abdominal sonogram.\par Continue current meds. declined antidepressant.

## 2022-05-27 NOTE — PHYSICAL EXAM
[No Acute Distress] : no acute distress [Well Nourished] : well nourished [Well Developed] : well developed [Well-Appearing] : well-appearing [Normal Sclera/Conjunctiva] : normal sclera/conjunctiva [PERRL] : pupils equal round and reactive to light [EOMI] : extraocular movements intact [Normal Outer Ear/Nose] : the outer ears and nose were normal in appearance [Supple] : supple [No Respiratory Distress] : no respiratory distress  [No Accessory Muscle Use] : no accessory muscle use [Clear to Auscultation] : lungs were clear to auscultation bilaterally [Normal Rate] : normal rate  [Regular Rhythm] : with a regular rhythm [Normal S1, S2] : normal S1 and S2 [No Murmur] : no murmur heard [No Edema] : there was no peripheral edema [Normal Posterior Cervical Nodes] : no posterior cervical lymphadenopathy [Normal Anterior Cervical Nodes] : no anterior cervical lymphadenopathy [No CVA Tenderness] : no CVA  tenderness [No Spinal Tenderness] : no spinal tenderness [No Joint Swelling] : no joint swelling [Grossly Normal Strength/Tone] : grossly normal strength/tone [No Rash] : no rash [Coordination Grossly Intact] : coordination grossly intact [No Focal Deficits] : no focal deficits [Normal Gait] : normal gait [Deep Tendon Reflexes (DTR)] : deep tendon reflexes were 2+ and symmetric [Normal Affect] : the affect was normal [Normal Insight/Judgement] : insight and judgment were intact [de-identified] : cast on right hand

## 2022-05-27 NOTE — REVIEW OF SYSTEMS
[Joint Pain] : joint pain [Suicidal] : not suicidal [Insomnia] : no insomnia [Anxiety] : anxiety [Depression] : depression [Negative] : Heme/Lymph

## 2022-05-27 NOTE — HISTORY OF PRESENT ILLNESS
[FreeTextEntry1] : see HPI  [de-identified] : Here for f/u Diabetes mellitus. She hs been feeling depressed due to her personal medical issues and worried about her jobless son who is  from his wife and  lives in patient's home in [facility] with his mentally ill child. Patient has not been eating healhy diet or exercise over last several weeks . She declined starting antidepressant. She is AAOX3,NAD. She is due for lab work. She is requesting completion of podiatry form for shoes /insert for plantar fascitis. She is undergoing PT for neck , back pain. She has cast on her right hand and wrist due to ligament injury which if unhealed may require surgery. She was recently treated for left eye stye. She was told by GI to undergo US abd. and colonoscopy and she will require clearance prior to procedure.

## 2022-05-27 NOTE — PATIENT PROFILE ADULT - ...
Status[de-identified] INPATIENT [101]   Type of Bed: Stepdown [17]   Cardiac Monitoring Required?: Yes   Inpatient Hospitalization Certified Necessary for the Following Reasons: 3.  Patient receiving treatment that can only be provided in an inpatient setting (further clarification in H&P documentation)   Admitting Diagnosis: Pneumothorax [[id number]]   Admitting Physician: Randall Irving [[id number]]   Attending Physician: Randall Irving [[id number]]   Estimated Length of Stay: 2 Midnights   Discharge Plan[de-identified] Home with Office Follow-up
21-Feb-2020 01:41:09

## 2022-05-31 ENCOUNTER — APPOINTMENT (OUTPATIENT)
Dept: ORTHOPEDIC SURGERY | Facility: CLINIC | Age: 77
End: 2022-05-31
Payer: COMMERCIAL

## 2022-05-31 PROCEDURE — 99072 ADDL SUPL MATRL&STAF TM PHE: CPT

## 2022-05-31 PROCEDURE — 99213 OFFICE O/P EST LOW 20 MIN: CPT

## 2022-05-31 NOTE — PHYSICAL EXAM
[de-identified] : Patient is WDWN, alert, and in no acute distress. Breathing is unlabored. She is grossly oriented to person, place, and time.\par \par Right Hand (Thumb):\par Patient presents in a thumb spica cast which was removed in office on 5/31/22\par No instability of the UCL to the MP joint of the right thumb upon stress testing. \par No residual swelling to the thumb\par Limitations of motion to the thumb status post casting for 6 weeks.\par Sensation is normal and intact.  [de-identified] : EXAM:  MR THUMB RT\par PROCEDURE DATE:  04/05/2022\par IMPRESSION:\par 1.  Low-grade partial-thickness tear of the dorsal attachment of the ulnar collateral ligament of the first MCP joint, as described above.\par 2.  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 Apr 10 2022  2:57PM

## 2022-05-31 NOTE — ADDENDUM
[FreeTextEntry1] : I, Isamar Fox wrote this note acting as a scribe for Dr. Miguel Mercado on May 31, 2022.

## 2022-05-31 NOTE — DISCUSSION/SUMMARY
[FreeTextEntry1] : The underlying pathophysiology was reviewed with the patient. XR films were reviewed with the patient. Discussed at length the nature of the patient’s condition. The right thumb symptoms appear secondary to possible UCL tear.\par \par The thumb spica cast was removed in office on 5/31/22.\par She may use a removable thumb spica brace as needed as she regains strength to the hand.\par She was advised to soak the hand in warm water and Epsom salts to regain full motion and function after being casted for 6 weeks.\par \par All questions answered, understanding verbalized. Patient in agreement with plan of care. Follow up in 6 weeks.
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
## 2022-06-03 LAB — INR PPP: 3.2

## 2022-06-06 ENCOUNTER — EVALUATION (OUTPATIENT)
Dept: PHYSICAL THERAPY | Age: 77
End: 2022-06-06
Payer: COMMERCIAL

## 2022-06-06 DIAGNOSIS — M54.6 ACUTE BILATERAL THORACIC BACK PAIN: ICD-10-CM

## 2022-06-06 DIAGNOSIS — M79.641 PAIN IN RIGHT HAND: ICD-10-CM

## 2022-06-06 DIAGNOSIS — M54.50 ACUTE BILATERAL LOW BACK PAIN, UNSPECIFIED WHETHER SCIATICA PRESENT: ICD-10-CM

## 2022-06-06 DIAGNOSIS — M54.2 NECK PAIN: Primary | ICD-10-CM

## 2022-06-06 PROCEDURE — 97162 PT EVAL MOD COMPLEX 30 MIN: CPT | Performed by: PHYSICAL THERAPIST

## 2022-06-06 PROCEDURE — 97140 MANUAL THERAPY 1/> REGIONS: CPT | Performed by: PHYSICAL THERAPIST

## 2022-06-06 PROCEDURE — 97110 THERAPEUTIC EXERCISES: CPT | Performed by: PHYSICAL THERAPIST
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
## 2022-06-08 ENCOUNTER — OFFICE VISIT (OUTPATIENT)
Dept: PHYSICAL THERAPY | Age: 77
End: 2022-06-08
Payer: COMMERCIAL

## 2022-06-08 DIAGNOSIS — M79.641 PAIN IN RIGHT HAND: ICD-10-CM

## 2022-06-08 DIAGNOSIS — M54.6 ACUTE BILATERAL THORACIC BACK PAIN: ICD-10-CM

## 2022-06-08 DIAGNOSIS — M54.2 NECK PAIN: Primary | ICD-10-CM

## 2022-06-08 DIAGNOSIS — M54.50 ACUTE BILATERAL LOW BACK PAIN, UNSPECIFIED WHETHER SCIATICA PRESENT: ICD-10-CM

## 2022-06-08 PROCEDURE — 97140 MANUAL THERAPY 1/> REGIONS: CPT

## 2022-06-08 PROCEDURE — 97110 THERAPEUTIC EXERCISES: CPT

## 2022-06-08 NOTE — PROGRESS NOTES
Daily Note     Today's date: 2022  Patient name: Jo Ann Santos  : 1945  MRN: [id number]  Referring provider: Liz Downs  Dx:   Encounter Diagnosis     ICD-10-CM    1  Neck pain  M54 2    2  Pain in right hand  M79 641    3  Acute bilateral low back pain, unspecified whether sciatica present  M54 50    4  Acute bilateral thoracic back pain  M54 6        Start Time: 0950  Stop Time: 1030  Total time in clinic (min): 40 minutes    Subjective: Patient states she is sore today and feels stiff  Objective: See treatment diary below      Assessment: Tolerated treatment fair  Limited tolerance to R thumb PROM secondary to pain  TTP at R UT with STM  Added cervical AROM, opposition, gripper, seated ball/band, and Nustep today with fair tolerance  Unable to reach 4th and 5th digit with opposition, instructed to work in a range that is comfortable  Will continue to progress as tolerance allows  Patient would benefit from continued PT  Plan: Continue per plan of care        Precautions: Afib, DM, HTN, cardiac, R TKR, R TSR    Patient relates difficulty transferring into supine position    HEP Medbridge: [id number] - exercises noted below as well as UT stretch      Manuals            R thumb/digits 10 8'           Cervical/trap STM (PRN) seated  7'                                     Neuro Re-Ed                                                                                                        Ther Ex             slant             NuStep  L3 6'           TB row             TB ext             Cervical AROM  10x ea   HEP           Opposition R  10x ea  HEP           gripper  Green 10x            Black roller             Hip ball (seated)  20x  HEP           Hip band (seated)  RTB 20x  HEP                                     Ther Activity                                       Gait Training                                       Modalities

## 2022-06-13 ENCOUNTER — OFFICE VISIT (OUTPATIENT)
Dept: PHYSICAL THERAPY | Age: 77
End: 2022-06-13
Payer: COMMERCIAL

## 2022-06-13 DIAGNOSIS — M79.641 PAIN IN RIGHT HAND: ICD-10-CM

## 2022-06-13 DIAGNOSIS — M54.2 NECK PAIN: Primary | ICD-10-CM

## 2022-06-13 DIAGNOSIS — M54.50 ACUTE BILATERAL LOW BACK PAIN, UNSPECIFIED WHETHER SCIATICA PRESENT: ICD-10-CM

## 2022-06-13 DIAGNOSIS — M54.6 ACUTE BILATERAL THORACIC BACK PAIN: ICD-10-CM

## 2022-06-13 PROCEDURE — 97140 MANUAL THERAPY 1/> REGIONS: CPT

## 2022-06-13 PROCEDURE — 97010 HOT OR COLD PACKS THERAPY: CPT

## 2022-06-13 PROCEDURE — 97110 THERAPEUTIC EXERCISES: CPT

## 2022-06-13 NOTE — PROGRESS NOTES
Daily Note     Today's date: 2022  Patient name: Skyler Monk  : 1945  MRN: [id number]  Referring provider: Sofia Cheung  Dx:   Encounter Diagnosis     ICD-10-CM    1  Neck pain  M54 2    2  Pain in right hand  M79 641    3  Acute bilateral low back pain, unspecified whether sciatica present  M54 50    4  Acute bilateral thoracic back pain  M54 6                   Subjective: Pt w/ c/o large increases in pain in lumbar and cervical region following last treatment session  Notes difficulty sleeping because of this  Unable to attribute increases in pain to any activity other than therapy  Pt rates pain as 8/10 at start of session  Objective: See treatment diary below      Assessment: Tolerated treatment well  Pt w/ c/o increased pain following last treatment, particularly cervical and lumbar regions  Pt also notes B knee pain and shoulder pain  Lumbar and cervical pain modulated w/ MHP and STM to areas  Discussion had w/ pt as potential candidate for aqua therapy to help with offloading and pain relief w/ active exercise  Pt agreeable  Plan: Pt to transition to aqua therapy        Precautions: Afib, DM, HTN, cardiac, R TKR, R TSR    Patient relates difficulty transferring into supine position    HEP Medbridge: [id number] - exercises noted below as well as UT stretch      Manuals           R thumb/digits 10 8' 5'          Cervical/trap STM (PRN) seated  7' 12'                                    Neuro Re-Ed                                                                                                        Ther Ex             slant             NuStep  L3 6' L2 7'          TB row             TB ext             Cervical AROM  10x ea   HEP           Opposition R  10x ea  HEP           Power Web   Yellow 2x10          gripper  Green 10x  Green 2x10          Black roller             Hip ball (seated)  20x  HEP           Hip band (seated)  RTB 20x  HEP           Stool roll-outs (for/side)   5"x10 ea                        Ther Activity                                       Gait Training                                       Modalities             MHP (cervical & lumbar)   x10' w/ NuStep and hand exercises

## 2022-06-14 LAB — INR PPP: 2.1

## 2022-06-16 ENCOUNTER — OFFICE VISIT (OUTPATIENT)
Dept: PHYSICAL THERAPY | Age: 77
End: 2022-06-16
Payer: COMMERCIAL

## 2022-06-16 DIAGNOSIS — M54.6 ACUTE BILATERAL THORACIC BACK PAIN: ICD-10-CM

## 2022-06-16 DIAGNOSIS — M54.50 ACUTE BILATERAL LOW BACK PAIN, UNSPECIFIED WHETHER SCIATICA PRESENT: ICD-10-CM

## 2022-06-16 DIAGNOSIS — M79.641 PAIN IN RIGHT HAND: ICD-10-CM

## 2022-06-16 DIAGNOSIS — M54.2 NECK PAIN: Primary | ICD-10-CM

## 2022-06-16 PROCEDURE — 97113 AQUATIC THERAPY/EXERCISES: CPT | Performed by: PHYSICAL THERAPIST

## 2022-06-16 NOTE — PROGRESS NOTES
Daily Note     Today's date: 2022  Patient name: Jerzy Cee  : 1945  MRN: [id number]  Referring provider: Piter John  Dx:   Encounter Diagnosis     ICD-10-CM    1  Neck pain  M54 2    2  Pain in right hand  M79 641    3  Acute bilateral low back pain, unspecified whether sciatica present  M54 50    4  Acute bilateral thoracic back pain  M54 6                   Subjective: 5/10 cervical pain left greater than right with moderate LBP  Notes doing better post last session  Continued pain with cervical rotation, worse with right rotation  Objective: See treatment diary below      Assessment:  Initiated aquatic PT this date with good tolerance for program as outlined  Tolerated treatment well  Patient would benefit from continued PT  Limited active ROM cervical lateral flexion and rotations  Improved thumb opposition post JWP and AROM  Plan: Continue per plan of care        Precautions: Afib, DM, HTN, cardiac, R TKR, R TSR    Patient relates difficulty transferring into supine position        Daily Treatment Diary         Exercise Diary              Water walking 10            Postural training             Gait training             Home exercise pgm/patient education 10            Wall: t/h raises 1            Hip abd/add 2             1            squats             Knee flex/ext 1            Step-ups (fwd/bkwd/ss)             SLS (eyes open/closed)             SLS w UE mvmt  AROM/ball toss             Weight shifting             UE Noodle work x 4              Shoulder rolls ant/post 10x each            UE AROM 4' (10x each)            Resistive UE work (paddles, bells, TB)             Core work on noodle (sitting/stdg)             Sit on noodle with movement             Seated on pool bench w proper posture 1            Ankle df/pf 1             1            Hip Ab/add 1            Knee flex/ext 1            Deep water mvmt             Deep water tx/stretching nv as dima            Specific self - stretches wall/steps                 Modalities  6/16            Whirlpool  With thumb AROM 10  5

## 2022-06-20 ENCOUNTER — APPOINTMENT (OUTPATIENT)
Dept: CARDIOLOGY | Facility: CLINIC | Age: 77
End: 2022-06-20
Payer: MEDICARE

## 2022-06-20 ENCOUNTER — NON-APPOINTMENT (OUTPATIENT)
Age: 77
End: 2022-06-20

## 2022-06-20 VITALS
DIASTOLIC BLOOD PRESSURE: 82 MMHG | WEIGHT: 220 LBS | OXYGEN SATURATION: 97 % | RESPIRATION RATE: 16 BRPM | TEMPERATURE: 97.5 F | SYSTOLIC BLOOD PRESSURE: 163 MMHG | HEART RATE: 73 BPM | HEIGHT: 61 IN | BODY MASS INDEX: 41.54 KG/M2

## 2022-06-20 PROCEDURE — 99215 OFFICE O/P EST HI 40 MIN: CPT

## 2022-06-20 PROCEDURE — 93000 ELECTROCARDIOGRAM COMPLETE: CPT

## 2022-06-27 ENCOUNTER — OFFICE VISIT (OUTPATIENT)
Dept: PHYSICAL THERAPY | Age: 77
End: 2022-06-27
Payer: COMMERCIAL

## 2022-06-27 DIAGNOSIS — M54.2 NECK PAIN: Primary | ICD-10-CM

## 2022-06-27 DIAGNOSIS — M54.50 ACUTE BILATERAL LOW BACK PAIN, UNSPECIFIED WHETHER SCIATICA PRESENT: ICD-10-CM

## 2022-06-27 DIAGNOSIS — M54.6 ACUTE BILATERAL THORACIC BACK PAIN: ICD-10-CM

## 2022-06-27 DIAGNOSIS — M79.641 PAIN IN RIGHT HAND: ICD-10-CM

## 2022-06-27 PROCEDURE — 97113 AQUATIC THERAPY/EXERCISES: CPT

## 2022-06-27 NOTE — PROGRESS NOTES
Daily Note     Today's date: 2022  Patient name: Aysha Klein  : 1945  MRN: [id number]  Referring provider: Caesar Montes  Dx:   Encounter Diagnosis     ICD-10-CM    1  Neck pain  M54 2    2  Pain in right hand  M79 641    3  Acute bilateral low back pain, unspecified whether sciatica present  M54 50    4  Acute bilateral thoracic back pain  M54 6        Start Time: 1100  Stop Time: 1200  Total time in clinic (min): 60 minutes    Subjective: pt notes she feels good in the water, " I can do a lot more w/ less pain " pt c/o sig spasms and cramps in R LE  Objective: See treatment diary below      Assessment: Tolerated treatment fair  Slowly increased pt's program today w/ complaints of cramping in R quad  Difficulty w/ steps getting out of the pool  Patient would benefit from continued PT      Plan: Continue per plan of care        Precautions: Afib, DM, HTN, cardiac, R TKR, R TSR    Patient relates difficulty transferring into supine position        Daily Treatment Diary         Exercise Diary             Water walking 10 10           Postural training             Gait training             Home exercise pgm/patient education 10            Wall: t/h raises 1 1           Hip abd/add 2 2            1 1           squats  1           Knee flex/ext 1 1           Step-ups (fwd/bkwd/ss)             SLS (eyes open/closed)             SLS w UE mvmt  AROM/ball toss             Weight shifting             UE Noodle work x 4   4           Shoulder rolls ant/post 10x each 2'           UE AROM 4' (10x each) 4'           Resistive UE work (paddles, bells, TB)             Core work on noodle (sitting/stdg)             Sit on noodle with movement             Seated on pool bench w proper posture 1 1           Ankle df/pf 1 1            1 1           Hip Ab/add 1 1           Knee flex/ext 1 1           Deep water mvmt  4           Deep water tx/stretching nv as dima 8           Specific self - stretches wall/steps  2               Modalities  6/16 6/27           Whirlpool  With thumb AROM 10  5 8

## 2022-06-30 ENCOUNTER — OFFICE VISIT (OUTPATIENT)
Dept: PHYSICAL THERAPY | Age: 77
End: 2022-06-30
Payer: COMMERCIAL

## 2022-06-30 DIAGNOSIS — M54.50 ACUTE BILATERAL LOW BACK PAIN, UNSPECIFIED WHETHER SCIATICA PRESENT: ICD-10-CM

## 2022-06-30 DIAGNOSIS — M54.2 NECK PAIN: Primary | ICD-10-CM

## 2022-06-30 DIAGNOSIS — M54.6 ACUTE BILATERAL THORACIC BACK PAIN: ICD-10-CM

## 2022-06-30 DIAGNOSIS — M79.641 PAIN IN RIGHT HAND: ICD-10-CM

## 2022-06-30 PROCEDURE — 97150 GROUP THERAPEUTIC PROCEDURES: CPT

## 2022-06-30 PROCEDURE — 97113 AQUATIC THERAPY/EXERCISES: CPT

## 2022-06-30 NOTE — PROGRESS NOTES
Daily Note     Today's date: 2022  Patient name: Aniket Holt  : 1945  MRN: [id number]  Referring provider: Marti Dhillon  Dx:   Encounter Diagnosis     ICD-10-CM    1  Neck pain  M54 2    2  Pain in right hand  M79 641    3  Acute bilateral low back pain, unspecified whether sciatica present  M54 50    4  Acute bilateral thoracic back pain  M54 6        Start Time: 1100  Stop Time: 1200  Total time in clinic (min): 60 minutes    Subjective: Patient reports 7-8/10 LBp and increased thumb pain, and c/o 4-5/10 neck pain  Objective: See treatment diary below    1:1 patietn seen for 30 minutes  Assessment: Tolerated treatment fairly well, cues for ex technique and posture  occ cramping in her legs with seated TE  Patient demonstrated fatigue post treatment and would benefit from continued PT      Plan: Continue per plan of care  Progress as tolerated        Precautions: Afib, DM, HTN, cardiac, R TKR, R TSR    Patient relates difficulty transferring into supine position        Daily Treatment Diary         Exercise Diary            Water walking 10 10 10          Postural training             Gait training             Home exercise pgm/patient education 10            Wall: t/h raises 1 1 1          Hip abd/add 2 2 2          Marching 1 1 1          squats  1 1          Knee flex/ext 1 1 1          Step-ups (fwd/bkwd/ss)             Hip ext/FF swing   2          SLS w UE mvmt  AROM/ball toss             Weight shifting             UE Noodle work x 4   4 4          Shoulder rolls ant/post 10x each 2' 2'          UE AROM 4' (10x each) 4' 4          Resistive UE work (paddles, bells, TB)             Core work on noodle (sitting/stdg)             Sit on noodle with movement             Seated on pool bench w proper posture 1 1 1          Ankle df/pf 1 1 1          marching 1 1 1           DWHip Ab/add 1 1 1          Knee flex/ext 1 1 1          Deep water mvmt  4 4          Deep water tx/stretching nv as dima 8 8          Specific self - stretches wall/steps  2 2              Modalities  6/16 6/27 6/30          Whirlpool  With thumb AROM 10  5 8 10

## 2022-07-07 ENCOUNTER — OFFICE VISIT (OUTPATIENT)
Dept: PHYSICAL THERAPY | Age: 77
End: 2022-07-07
Payer: COMMERCIAL

## 2022-07-07 DIAGNOSIS — M79.641 PAIN IN RIGHT HAND: ICD-10-CM

## 2022-07-07 DIAGNOSIS — M54.6 ACUTE BILATERAL THORACIC BACK PAIN: ICD-10-CM

## 2022-07-07 DIAGNOSIS — M54.2 NECK PAIN: Primary | ICD-10-CM

## 2022-07-07 DIAGNOSIS — M54.50 ACUTE BILATERAL LOW BACK PAIN, UNSPECIFIED WHETHER SCIATICA PRESENT: ICD-10-CM

## 2022-07-07 PROCEDURE — 97113 AQUATIC THERAPY/EXERCISES: CPT | Performed by: PHYSICAL THERAPIST

## 2022-07-07 NOTE — PROGRESS NOTES
Daily Note     Today's date: 2022  Patient name: Bhupendra Thakur  : 1945  MRN: [id number]  Referring provider: Morgan Anthony  Dx:   Encounter Diagnosis     ICD-10-CM    1  Neck pain  M54 2    2  Acute bilateral thoracic back pain  M54 6    3  Acute bilateral low back pain, unspecified whether sciatica present  M54 50    4  Pain in right hand  M79 641        Start Time: 1500  Stop Time: 1555  Total time in clinic (min): 55 minutes    Subjective: /10 cervical pain; 7/10 right LBP and hip pain into buttock  Notes attempted to stain her deck with much difficulty  Has not returned to crocheting yet  Right buttock pain with prolonged walking  Better post PT sessions into the next day  Objective: See treatment diary below      Assessment: Tolerated treatment well  Patient would benefit from continued PT Doing well with water exercises with relief generally post session  Patient to see ortho next week for right thumb  Some right shoulder and thoracic discomfort with DEW and with forward shoulder rolls today  Cramping right thigh with attempted lifting of R LE onto step for stretch  Plan: Continue per plan of care        Precautions: Afib, DM, HTN, cardiac, R TKR, R TSR    Patient relates difficulty transferring into supine position        Daily Treatment Diary         Exercise Diary           Water walking 10 10 10 10         Postural training             Gait training             Home exercise pgm/patient education 10            Wall: t/h raises 1 1 1 1         Hip abd/add 2 2 2 2         Marching 1 1 1 1         squats  1 1 1         Knee flex/ext 1 1 1 1         Step-ups (fwd/bkwd/ss)             Hip ext/FF swing   2 2         SLS w UE mvmt  AROM/ball toss             Weight shifting             UE Noodle work x 4   4 4 4'         Shoulder rolls ant/post 10x each 2' 2' 2'         UE AROM 4' (10x each) 4' 4 4         Resistive UE work (paddles, bells, TB)             Core work on noodle (sitting/stdg)             Sit on noodle with movement             Seated on pool bench w proper posture 1 1 1 1         Ankle df/pf 1 1 1 1         marching 1 1 1 1          DWHip Ab/add 1 1 1 1         Knee flex/ext 1 1 1 1         Deep water mvmt  4 4 4         Deep water tx/stretching nv as dima 8 8 8         Specific self - stretches wall/steps  2 2 2             Modalities  6/16 6/27 6/30 7/7         Whirlpool  With thumb AROM 10  5 8 10 10

## 2022-07-11 ENCOUNTER — RX RENEWAL (OUTPATIENT)
Age: 77
End: 2022-07-11

## 2022-07-12 ENCOUNTER — APPOINTMENT (OUTPATIENT)
Dept: ORTHOPEDIC SURGERY | Facility: CLINIC | Age: 77
End: 2022-07-12

## 2022-07-12 DIAGNOSIS — M19.049 PRIMARY OSTEOARTHRITIS, UNSPECIFIED HAND: ICD-10-CM

## 2022-07-12 PROCEDURE — 20600 DRAIN/INJ JOINT/BURSA W/O US: CPT | Mod: RT

## 2022-07-12 PROCEDURE — 99072 ADDL SUPL MATRL&STAF TM PHE: CPT

## 2022-07-12 PROCEDURE — 99213 OFFICE O/P EST LOW 20 MIN: CPT | Mod: 25

## 2022-07-12 NOTE — END OF VISIT
[FreeTextEntry3] : All medical record entries made by the Scribe were at my,  Dr. Miguel Mercado MD., direction and personally dictated by me on 07/12/2022. I have personally reviewed the chart and agree that the record accurately reflects my personal performance of the history, physical exam, assessment and plan.

## 2022-07-12 NOTE — DISCUSSION/SUMMARY
[FreeTextEntry1] : The underlying pathophysiology was reviewed with the patient. XR films were reviewed with the patient. Discussed at length the nature of the patient’s condition. The right thumb symptoms appear secondary to possible UCL tear.\par \par At this time, I discussed with her that her residual symptoms are seemingly emanating from her CMC joint secondary to arthritis. I therefore recommended she soak the hand in warm water and Epsom salts for relief as well as a cortisone injection to the CMC joint of the right thumb.\par \par The patient wishes to proceed with a cortisone injection at this time. Under sterile precautions, an injection of 0.5 cc 1% lidocaine with 0.25 cc of Kenalog and 0.25 cc of Dexamethasone was administered into the RIGHT basal joint. The patient tolerated the procedure well. Rest and apply ice. \par \par All questions answered, understanding verbalized. Patient in agreement with plan of care. Follow up as needed.

## 2022-07-12 NOTE — PHYSICAL EXAM
[de-identified] : Patient is WDWN, alert, and in no acute distress. Breathing is unlabored. She is grossly oriented to person, place, and time.\par \par Right Hand (Thumb):\par No instability of the UCL to the MP joint of the right thumb upon stress testing. \par No residual swelling to the thumb\par There is a full arc of motion to the right thumb.\par Mild tenderness noted over the A1 pulley.\par Basal joint tenderness, with a positive grind test. \par Sensation is normal and intact.  [de-identified] : EXAM:  MR THUMB RT\par PROCEDURE DATE:  04/05/2022\par IMPRESSION:\par 1.  Low-grade partial-thickness tear of the dorsal attachment of the ulnar collateral ligament of the first MCP joint, as described above.\par 2.  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 Apr 10 2022  2:57PM
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
## 2022-07-12 NOTE — ADDENDUM
[FreeTextEntry1] : I, Isamar Fox wrote this note acting as a scribe for Dr. Miguel Mercado on Jul 12, 2022.

## 2022-07-19 ENCOUNTER — OFFICE VISIT (OUTPATIENT)
Dept: PHYSICAL THERAPY | Age: 77
End: 2022-07-19
Payer: COMMERCIAL

## 2022-07-19 DIAGNOSIS — M79.641 PAIN IN RIGHT HAND: ICD-10-CM

## 2022-07-19 DIAGNOSIS — M54.6 ACUTE BILATERAL THORACIC BACK PAIN: ICD-10-CM

## 2022-07-19 DIAGNOSIS — M54.50 ACUTE BILATERAL LOW BACK PAIN, UNSPECIFIED WHETHER SCIATICA PRESENT: ICD-10-CM

## 2022-07-19 DIAGNOSIS — M54.2 NECK PAIN: Primary | ICD-10-CM

## 2022-07-19 PROCEDURE — 97150 GROUP THERAPEUTIC PROCEDURES: CPT

## 2022-07-19 PROCEDURE — 97113 AQUATIC THERAPY/EXERCISES: CPT

## 2022-07-19 NOTE — PROGRESS NOTES
Daily Note     Today's date: 2022  Patient name: Tania Cho  : 1945  MRN: [id number]  Referring provider: Arin Sandoval  Dx:   Encounter Diagnosis     ICD-10-CM    1  Neck pain  M54 2    2  Acute bilateral thoracic back pain  M54 6    3  Acute bilateral low back pain, unspecified whether sciatica present  M54 50    4  Pain in right hand  M79 [id number]        Start Time: 1100  Stop Time: 1200  Total time in clinic (min): 60 minutes    Subjective: pt notes no sig change in pain today  "every joint hurts today, maybe from the weather "      Objective: See treatment diary below  Pt seen 1:1 for 30 minutes and the rest of the time in a group setting  Assessment: Tolerated treatment fairly well  Patient would benefit from continued PT      Plan: Continue per plan of care        Precautions: Afib, DM, HTN, cardiac, R TKR, R TSR    Patient relates difficulty transferring into supine position        Daily Treatment Diary         Exercise Diary          Water walking 10 10 10 10 10        Postural training             Gait training             Home exercise pgm/patient education 10            Wall: t/h raises 1 1 1 1 1        Hip abd/add 2 2 2 2 2        Marching 1 1 1 1 1        squats  1 1 1 1        Knee flex/ext 1 1 1 1 1        Step-ups (fwd/bkwd/ss)             Hip ext/FF swing   2 2 2        SLS w UE mvmt  AROM/ball toss             Weight shifting             UE Noodle work x 4   4 4 4' 4        Shoulder rolls ant/post 10x each 2' 2' 2' 2        UE AROM 4' (10x each) 4' 4 4 4        Resistive UE work (paddles, bells, TB)             Core work on noodle (sitting/stdg)             Sit on noodle with movement             Seated on pool bench w proper posture 1 1 1 1 1        Ankle df/pf 1 1 1 1 1        marching 1 1 1 1 1         DWHip Ab/add 1 1 1 1 1        Knee flex/ext 1 1 1 1 1        Deep water mvmt  4 4 4 4        Deep water tx/stretching nv as dima 8 8 8 8        Specific self - stretches wall/steps  2 2 2 2            Modalities  6/16 6/27 6/30 7/7 7/19        Whirlpool  With thumb AROM 10  5 8 10 10 10

## 2022-07-21 ENCOUNTER — OFFICE VISIT (OUTPATIENT)
Dept: PHYSICAL THERAPY | Age: 77
End: 2022-07-21
Payer: COMMERCIAL

## 2022-07-21 DIAGNOSIS — M54.50 ACUTE BILATERAL LOW BACK PAIN, UNSPECIFIED WHETHER SCIATICA PRESENT: ICD-10-CM

## 2022-07-21 DIAGNOSIS — M54.6 ACUTE BILATERAL THORACIC BACK PAIN: ICD-10-CM

## 2022-07-21 DIAGNOSIS — M54.2 NECK PAIN: Primary | ICD-10-CM

## 2022-07-21 DIAGNOSIS — M79.641 PAIN IN RIGHT HAND: ICD-10-CM

## 2022-07-21 PROCEDURE — 97113 AQUATIC THERAPY/EXERCISES: CPT
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
## 2022-07-26 ENCOUNTER — OFFICE VISIT (OUTPATIENT)
Dept: PHYSICAL THERAPY | Age: 77
End: 2022-07-26
Payer: COMMERCIAL

## 2022-07-26 DIAGNOSIS — M79.641 PAIN IN RIGHT HAND: ICD-10-CM

## 2022-07-26 DIAGNOSIS — M54.2 NECK PAIN: Primary | ICD-10-CM

## 2022-07-26 DIAGNOSIS — M54.50 ACUTE BILATERAL LOW BACK PAIN, UNSPECIFIED WHETHER SCIATICA PRESENT: ICD-10-CM

## 2022-07-26 DIAGNOSIS — M54.6 ACUTE BILATERAL THORACIC BACK PAIN: ICD-10-CM

## 2022-07-26 PROCEDURE — 97113 AQUATIC THERAPY/EXERCISES: CPT
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
## 2022-07-26 NOTE — PROGRESS NOTES
Daily Note     Today's date: 2022  Patient name: Charly Ramírez  : 1945  MRN: [id number]  Referring provider: Radha Perry  Dx:   Encounter Diagnosis     ICD-10-CM    1  Neck pain  M54 2    2  Acute bilateral thoracic back pain  M54 6    3  Acute bilateral low back pain, unspecified whether sciatica present  M54 50    4  Pain in right hand  M79 641        Start Time: 1200  Stop Time: 1300  Total time in clinic (min): 60 minutes    Subjective: pt notes neck is 5/10 and R hand 4/10  she c/o R LE/hip pain w/ hip ex's in the water  She continues to c/o R hip pain w/ certain movements   She notes difficulty stretching R LE in the water and at home  Objective: See treatment diary below      Assessment: Tolerated treatment fairly well  Slow and steady progress w/ aquatic PT  Pt shows improvement in her movements while in the water, it's easier for her to complete more ex's in the water w/ less pain  she has increased her endurance for deep water bike  Pain w/ seated hip abd in R LE, modified as needed  She continues to struggle w/ stretching R LE at all in the water due to increased pain  She has difficulty getting out of the pool steps due to pain in R LE  She continues to use SPC to ambulate on daysi  Patient would benefit from continued PT      Plan: Continue per plan of care        Precautions: Afib, DM, HTN, cardiac, R TKR, R TSR    Patient relates difficulty transferring into supine position        Daily Treatment Diary         Exercise Diary        Water walking 10 10 10 10 10 10 10      Postural training             Gait training             Home exercise pgm/patient education 10            Wall: t/h raises 1 1 1 1 1 1 1      Hip abd/add 2 2 2 2 2 2 2      Marching 1 1 1 1 1 1 1      squats  1 1 1 1 1 1      Knee flex/ext 1 1 1 1 1 1 1      Step-ups (fwd/bkwd/ss)             Hip ext/FF swing   2 2 2 2 2      SLS w UE mvmt  AROM/ball toss             Weight shifting             UE Noodle work x 4   4 4 4' 4 1 1      Shoulder rolls ant/post 10x each 2' 2' 2' 2 D/c hep       UE AROM 4' (10x each) 4' 4 4 4 4 CB 4      Resistive UE work (paddles, bells, TB)      2' isaac 2      Core work on noodle (sitting/stdg)             Sit on noodle with movement             Seated on pool bench w proper posture 1 1 1 1 1 1 1      Ankle df/pf 1 1 1 1 1 1 1      marching 1 1 1 1 1 1 1       DWHip Ab/add 1 1 1 1 1 1 1      Knee flex/ext 1 1 1 1 1 1 1      Deep water mvmt  4 4 4 4 5 5      Deep water tx/stretching nv as dima 8 8 8 8 8 8      Specific self - stretches wall/steps  2 2 2 2 2 2          Modalities  6/16 6/27 6/30 7/7 7/19 7/21 7/26      Whirlpool  With thumb AROM 10  5 8 10 10 10 10 10

## 2022-07-27 ENCOUNTER — EVALUATION (OUTPATIENT)
Dept: PHYSICAL THERAPY | Age: 77
End: 2022-07-27
Payer: COMMERCIAL

## 2022-07-27 DIAGNOSIS — M54.6 ACUTE BILATERAL THORACIC BACK PAIN: ICD-10-CM

## 2022-07-27 DIAGNOSIS — M79.641 PAIN IN RIGHT HAND: ICD-10-CM

## 2022-07-27 DIAGNOSIS — M54.2 NECK PAIN: Primary | ICD-10-CM

## 2022-07-27 DIAGNOSIS — M54.50 ACUTE BILATERAL LOW BACK PAIN, UNSPECIFIED WHETHER SCIATICA PRESENT: ICD-10-CM

## 2022-07-27 PROCEDURE — 97110 THERAPEUTIC EXERCISES: CPT | Performed by: PHYSICAL THERAPIST

## 2022-07-27 PROCEDURE — 97140 MANUAL THERAPY 1/> REGIONS: CPT | Performed by: PHYSICAL THERAPIST

## 2022-07-27 NOTE — PROGRESS NOTES
PT Re-Evaluation     Today's date: 2022  Patient name: Gustavo Gil  : 1945  MRN: [id number]  Referring provider: Mitchell Keating  Dx:   Encounter Diagnosis     ICD-10-CM    1  Neck pain  M54 2    2  Acute bilateral thoracic back pain  M54 6    3  Acute bilateral low back pain, unspecified whether sciatica present  M54 50    4  Pain in right hand  M79 641        Start Time: 1515  Stop Time: 1615  Total time in clinic (min): 60 minutes    Assessment  Assessment details: 2022, patient demonstrates increased ROM and strength to low back and cervical area and can now sleep much better   Patient demonstrates improved FOTO scores and can now use right hand much better but still has pain to walk  Impairments: abnormal gait, abnormal or restricted ROM, abnormal movement, activity intolerance, impaired balance, impaired physical strength, lacks appropriate home exercise program, pain with function, weight-bearing intolerance, poor posture  and poor body mechanics  Understanding of Dx/Px/POC: good   Prognosis: good    Goals  ST-3 WEEKS  1  Decrease pain by 2-3 points on VAS at its worst MET  2  Increase ROM by > 5-10 deg in all deficients planes  MET  3  Increase UE, LE and core by 1/2 MMT grade in all deficient planes  WORKING TOWARDS    LT-6 WEEKS  1  Patient to be independent with a/iadls  2  Increase functional activities for leisure and home activities including return to crocheting  WORKING TOWARDS  3   Independent with HEP and/or fitness program     Plan  Patient would benefit from: skilled physical therapy  Planned modality interventions: cryotherapy and thermotherapy: hydrocollator packs  Planned therapy interventions: activity modification, behavior modification, body mechanics training, aquatic therapy, home exercise program, joint mobilization, manual therapy, neuromuscular re-education, patient education, postural training, therapeutic activities and therapeutic exercise  Frequency: 2-3x week  Duration in weeks: 6  Plan of Care beginning date: 2022  Plan of Care expiration date: 2022  Treatment plan discussed with: patient        Subjective Evaluation    History of Present Illness  Date of onset: 2022  Mechanism of injury: 2022, patient reports decreased pain to back and increased ROM and strength after 10 PT visits  Pain  Current pain ratin (7/10 thumb)  At best pain rating: 3 (4/10)  At worst pain rating: 10 (thumb 10/10)  Location: neck and entire back bilaterally;  Quality: sharp, tight and dull ache  Relieving factors: heat  Aggravating factors: sitting, standing and walking (turning head; prolonged positioning)  Progression: improved    Social Support  Lives with: adult children    Hand dominance: right    Patient Goals  Patient goals for therapy: independence with ADLs/IADLs, decreased pain, increased motion and increased strength  Patient goal: return to [employer]; return ot rosendo; return to gardening        Objective     Static Posture     Head  Forward  Shoulders  Rounded  Scapulae  Right elevated  Lumbar Spine   Decreased lordosis  Palpation   Left   Hypertonic in the erector spinae, cervical paraspinals, levator scapulae, rhomboids, sternocleidomastoid, suboccipitals and upper trapezius  Tenderness of the cervical paraspinals, levator scapulae, rhomboids, suboccipitals and upper trapezius  Right   Hypertonic in the erector spinae, cervical paraspinals, levator scapulae, rhomboids, sternocleidomastoid, suboccipitals and upper trapezius  Tenderness of the cervical paraspinals, levator scapulae, rhomboids, suboccipitals and upper trapezius  Tenderness     Right Wrist/Hand   Tenderness in the scaphoid  Left Hip   Tenderness in the PSIS  Right Hip   Tenderness in the PSIS       Neurological Testing     Sensation   Cervical/Thoracic   Left   Intact: light touch    Right   Intact: light touch  Diminished: light touch    Comments   Right light touch: tip of thumb       Lumbar   Left   Intact: light touch    Right   Intact: light touch    Active Range of Motion   Cervical/Thoracic Spine       Cervical    Flexion:  WFL  Extension: 35 degrees     with pain  Left lateral flexion: 15 degrees     with pain  Right lateral flexion: 20 degrees     with pain  Left rotation: 45 degrees with pain  Right rotation: 45 degrees    with pain    Left Wrist   Wrist flexion: WFL  Wrist extension: WFL  Radial deviation: WFL  Ulnar deviation: WFL      Right Wrist   Wrist flexion: WFL  Wrist extension: WFl  Radial deviation: WFL  Ulnar deviation: WFL    Right Thumb   Flexion     CMC: 15    MP: 25    DIP: 15  Extension     CMC: -10    MP: 0    DIP: 0  Radial Abduction    CMC: 25  Opposition: -1 cm from 5th digit    Lumbar   Flexion: 65 degrees   Extension: 15 degrees  with pain  Left lateral flexion:  Restriction level: moderate  Right lateral flexion:  Restriction level: moderate    Strength/Myotome Testing     Left Shoulder     Planes of Motion   Flexion: 4+   Abduction: 4+     Right Shoulder     Planes of Motion   Flexion: 3+   Abduction: 3+     Left Elbow   Flexion: 4+  Extension: 4+    Right Elbow   Flexion: 4+  Extension: 4+    Left Wrist/Hand   Wrist extension: 4  Wrist flexion: 4  Radial deviation: 4+  Ulnar deviation: 4+  Thumb extension: 5     (2nd hand position)     Trial 1: 13    Trial 2: 11    Trial 3: 15    Thumb Strength  Key/Lateral Pinch     Trial 1: 8    Trial 2: 8    Trial 3: 8    Average: 8    Right Wrist/Hand   Wrist extension: 4-  Wrist flexion: 4-  Radial deviation: 4-  Ulnar deviation: 4-  Thumb extension: 3- and 3     (2nd hand position)     Trial 1: 9    Trial 2: 8    Trial 3: 9    Thumb Strength   Key/Lateral Pinch     Trial 1: 9    Trial 2: 9    Trial 3: 9    Average: 9    Left Hip   Planes of Motion   Flexion: 4-  Abduction: 4-  Adduction: 4-    Right Hip   Planes of Motion   Flexion: 4-  Abduction: 4-  Adduction: 4-    Left Knee   Flexion: 4-  Extension: 4-    Right Knee   Flexion: 4  Extension: 4    Left Ankle/Foot   Dorsiflexion: 4  Plantar flexion: 5    Right Ankle/Foot   Dorsiflexion: 5  Plantar flexion: 5    Ambulation   Weight-Bearing Status   Assistive device used: single point cane    Ambulation: Level Surfaces   Ambulation with assistive device: independent    Functional Assessment        Comments  Ambulation with SPC with decreased steadiness and difficulty holding cane in right hand  Unable to rosendo  Daily activities at home more challenging - requires rest periods  Difficulty reading due to neck and thumb pain  No participation in community exercise program          Precautions: Afib, DM, HTN, cardiac, R TKR, R TSR    Patient relates difficulty transferring into supine position    HEP Medbridge: [id number] - exercises noted below as well as UT stretch      Manuals 6/6 6/8 6/13 7/27         R thumb/digits 10 8' 5' 5         Cervical/trap STM (PRN) seated  7' 12' 10                                   Neuro Re-Ed                                                                                                        Ther Ex             slant             NuStep  L3 6' L2 7' 7 min         TB row             TB ext             Cervical AROM  10x ea   HEP  10x         Opposition R  10x ea  HEP           Power Web   Yellow 2x10          gripper  Green 10x  Green 2x10 2x10         Black roller             Hip ball (seated)  20x  HEP           Hip band (seated)  RTB 20x  HEP           Stool roll-outs (for/side)   5"x10 ea                        Ther Activity                                       Gait Training                                       Modalities             MHP (cervical & lumbar)   x10' w/ NuStep and hand exercises 10 min

## 2022-07-27 NOTE — LETTER
2022    [address] [place] [address]    Patient: Sabra Wang   YOB: 1945   Date of Visit: 2022     Encounter Diagnosis     ICD-10-CM    1  Neck pain  M54 2    2  Acute bilateral thoracic back pain  M54 6    3  Acute bilateral low back pain, unspecified whether sciatica present  M54 50    4  Pain in right hand  M79 641        Dear Dr Ozzy Sylvester:    Thank you for your recent referral of Sabra Wang  Please review the attached evaluation summary from Phyllis's recent visit  Please verify that you agree with the plan of care by signing the attached order  If you have any questions or concerns, please do not hesitate to call  I sincerely appreciate the opportunity to share in the care of one of your patients and hope to have another opportunity to work with you in the near future  Sincerely,    Aysha Boyd, PT      Referring Provider:      I certify that I have read the below Plan of Care and certify the need for these services furnished under this plan of treatment while under my care  Susana Li [address]  Via Fax: [phone]          PT Re-Evaluation     Today's date: 2022  Patient name: Sabra Wang  : 1945  MRN: [id number]  Referring provider: Evert Coelho  Dx:   Encounter Diagnosis     ICD-10-CM    1  Neck pain  M54 2    2  Acute bilateral thoracic back pain  M54 6    3  Acute bilateral low back pain, unspecified whether sciatica present  M54 50    4  Pain in right hand  M79 641        Start Time: 1515  Stop Time: 1615  Total time in clinic (min): 60 minutes    Assessment  Assessment details: 2022, patient demonstrates increased ROM and strength to low back and cervical area and can now sleep much better   Patient demonstrates improved FOTO scores and can now use right hand much better but still has pain to walk    Impairments: abnormal gait, abnormal or restricted ROM, abnormal movement, activity intolerance, impaired balance, impaired physical strength, lacks appropriate home exercise program, pain with function, weight-bearing intolerance, poor posture  and poor body mechanics  Understanding of Dx/Px/POC: good   Prognosis: good    Goals  ST-3 WEEKS  1  Decrease pain by 2-3 points on VAS at its worst MET  2  Increase ROM by > 5-10 deg in all deficients planes  MET  3  Increase UE, LE and core by 1/2 MMT grade in all deficient planes  WORKING TOWARDS    LT-6 WEEKS  1  Patient to be independent with a/iadls  2  Increase functional activities for leisure and home activities including return to crocheting  WORKING TOWARDS  3  Independent with HEP and/or fitness program     Plan  Patient would benefit from: skilled physical therapy  Planned modality interventions: cryotherapy and thermotherapy: hydrocollator packs  Planned therapy interventions: activity modification, behavior modification, body mechanics training, aquatic therapy, home exercise program, joint mobilization, manual therapy, neuromuscular re-education, patient education, postural training, therapeutic activities and therapeutic exercise  Frequency: 2-3x week  Duration in weeks: 6  Plan of Care beginning date: 2022  Plan of Care expiration date: 2022  Treatment plan discussed with: patient        Subjective Evaluation    History of Present Illness  Date of onset: 2022  Mechanism of injury: 2022, patient reports decreased pain to back and increased ROM and strength after 10 PT visits    Pain  Current pain ratin (7/10 thumb)  At best pain rating: 3 (4/10)  At worst pain rating: 10 (thumb 10/10)  Location: neck and entire back bilaterally;  Quality: sharp, tight and dull ache  Relieving factors: heat  Aggravating factors: sitting, standing and walking (turning head; prolonged positioning)  Progression: improved    Social Support  Lives with: adult children    Hand dominance: right    Patient Goals  Patient goals for therapy: independence with ADLs/IADLs, decreased pain, increased motion and increased strength  Patient goal: return to [employer]; return ot rosendo; return to gardening        Objective     Static Posture     Head  Forward  Shoulders  Rounded  Scapulae  Right elevated  Lumbar Spine   Decreased lordosis  Palpation   Left   Hypertonic in the erector spinae, cervical paraspinals, levator scapulae, rhomboids, sternocleidomastoid, suboccipitals and upper trapezius  Tenderness of the cervical paraspinals, levator scapulae, rhomboids, suboccipitals and upper trapezius  Right   Hypertonic in the erector spinae, cervical paraspinals, levator scapulae, rhomboids, sternocleidomastoid, suboccipitals and upper trapezius  Tenderness of the cervical paraspinals, levator scapulae, rhomboids, suboccipitals and upper trapezius  Tenderness     Right Wrist/Hand   Tenderness in the scaphoid  Left Hip   Tenderness in the PSIS  Right Hip   Tenderness in the PSIS  Neurological Testing     Sensation   Cervical/Thoracic   Left   Intact: light touch    Right   Intact: light touch  Diminished: light touch    Comments   Right light touch: tip of thumb       Lumbar   Left   Intact: light touch    Right   Intact: light touch    Active Range of Motion   Cervical/Thoracic Spine       Cervical    Flexion:  WFL  Extension: 35 degrees     with pain  Left lateral flexion: 15 degrees     with pain  Right lateral flexion: 20 degrees     with pain  Left rotation: 45 degrees with pain  Right rotation: 45 degrees    with pain    Left Wrist   Wrist flexion: WFL  Wrist extension: WFL  Radial deviation: WFL  Ulnar deviation: WFL      Right Wrist   Wrist flexion: WFL  Wrist extension: WFl  Radial deviation: WFL  Ulnar deviation: WFL    Right Thumb   Flexion     CMC: 15    MP: 25    DIP: 15  Extension     CMC: -10    MP: 0    DIP: 0  Radial Abduction    CMC: 25  Opposition: -1 cm from 5th digit    Lumbar   Flexion: 65 degrees   Extension: 15 degrees  with pain  Left lateral flexion:  Restriction level: moderate  Right lateral flexion:  Restriction level: moderate    Strength/Myotome Testing     Left Shoulder     Planes of Motion   Flexion: 4+   Abduction: 4+     Right Shoulder     Planes of Motion   Flexion: 3+   Abduction: 3+     Left Elbow   Flexion: 4+  Extension: 4+    Right Elbow   Flexion: 4+  Extension: 4+    Left Wrist/Hand   Wrist extension: 4  Wrist flexion: 4  Radial deviation: 4+  Ulnar deviation: 4+  Thumb extension: 5     (2nd hand position)     Trial 1: 13    Trial 2: 11    Trial 3: 15    Thumb Strength  Key/Lateral Pinch     Trial 1: 8    Trial 2: 8    Trial 3: 8    Average: 8    Right Wrist/Hand   Wrist extension: 4-  Wrist flexion: 4-  Radial deviation: 4-  Ulnar deviation: 4-  Thumb extension: 3- and 3     (2nd hand position)     Trial 1: 9    Trial 2: 8    Trial 3: 9    Thumb Strength   Key/Lateral Pinch     Trial 1: 9    Trial 2: 9    Trial 3: 9    Average: 9    Left Hip   Planes of Motion   Flexion: 4-  Abduction: 4-  Adduction: 4-    Right Hip   Planes of Motion   Flexion: 4-  Abduction: 4-  Adduction: 4-    Left Knee   Flexion: 4-  Extension: 4-    Right Knee   Flexion: 4  Extension: 4    Left Ankle/Foot   Dorsiflexion: 4  Plantar flexion: 5    Right Ankle/Foot   Dorsiflexion: 5  Plantar flexion: 5    Ambulation   Weight-Bearing Status   Assistive device used: single point cane    Ambulation: Level Surfaces   Ambulation with assistive device: independent    Functional Assessment        Comments  Ambulation with SPC with decreased steadiness and difficulty holding cane in right hand  Unable to rosendo  Daily activities at home more challenging - requires rest periods  Difficulty reading due to neck and thumb pain  No participation in community exercise program          Precautions: Afib, DM, HTN, cardiac, R TKR, R TSR    Patient relates difficulty transferring into supine position    HEP Medbridge: [id number] - exercises noted below as well as UT stretch      Manuals 6/6 6/8 6/13 7/27         R thumb/digits 10 8' 5' 5         Cervical/trap STM (PRN) seated  7' 12' 10                                   Neuro Re-Ed                                                                                                        Ther Ex             slant             NuStep  L3 6' L2 7' 7 min         TB row             TB ext             Cervical AROM  10x ea   HEP  10x         Opposition R  10x ea  HEP           Power Web   Yellow 2x10          gripper  Green 10x  Green 2x10 2x10         Black roller             Hip ball (seated)  20x  HEP           Hip band (seated)  RTB 20x  HEP           Stool roll-outs (for/side)   5"x10 ea                        Ther Activity                                       Gait Training                                       Modalities             MHP (cervical & lumbar)   x10' w/ NuStep and hand exercises 10 min

## 2022-07-28 ENCOUNTER — APPOINTMENT (OUTPATIENT)
Dept: PHYSICAL THERAPY | Age: 77
End: 2022-07-28
Payer: COMMERCIAL

## 2022-07-29 ENCOUNTER — APPOINTMENT (OUTPATIENT)
Dept: PHYSICAL THERAPY | Age: 77
End: 2022-07-29
Payer: COMMERCIAL

## 2022-08-03 ENCOUNTER — OFFICE VISIT (OUTPATIENT)
Dept: PHYSICAL THERAPY | Age: 77
End: 2022-08-03
Payer: COMMERCIAL

## 2022-08-03 DIAGNOSIS — M54.6 ACUTE BILATERAL THORACIC BACK PAIN: ICD-10-CM

## 2022-08-03 DIAGNOSIS — M54.50 ACUTE BILATERAL LOW BACK PAIN, UNSPECIFIED WHETHER SCIATICA PRESENT: ICD-10-CM

## 2022-08-03 DIAGNOSIS — M54.2 NECK PAIN: Primary | ICD-10-CM

## 2022-08-03 DIAGNOSIS — M79.641 PAIN IN RIGHT HAND: ICD-10-CM

## 2022-08-03 PROCEDURE — 97113 AQUATIC THERAPY/EXERCISES: CPT

## 2022-08-03 NOTE — PROGRESS NOTES
Daily Note     Today's date: 8/3/2022  Patient name: Sabra Wang  : 1945  MRN: [id number]  Referring provider: Evert Coelho  Dx:   Encounter Diagnosis     ICD-10-CM    1  Neck pain  M54 2    2  Acute bilateral thoracic back pain  M54 6    3  Acute bilateral low back pain, unspecified whether sciatica present  M54 50    4  Pain in right hand  M79 641        Start Time: 1300  Stop Time: 1400  Total time in clinic (min): 60 minutes    Subjective:  Patient reports she is doing better, less pain and able to tolerate more activities without cramping  Objective: See treatment diary below      Assessment: Tolerated treatment fairly well, only had one episode with cramping in her R hip and leg with seated TE  No c/o pain with TE  Patient demonstrated fatigue post treatment and would benefit from continued PT      Plan: Continue per plan of care            Exercise Diary  6/16 6/27 6/30 7/7 7/19 7/21 7/26 8/3     Water walking 10 10 10 10 10 10 10 10     Postural training             Gait training             Home exercise pgm/patient education 10            Wall: t/h raises 1 1 1 1 1 1 1 1     Hip abd/add 2 2 2 2 2 2 2 2     Marching 1 1 1 1 1 1 1 1     squats  1 1 1 1 1 1 1     Knee flex/ext 1 1 1 1 1 1 1 1     Step-ups (fwd/bkwd/ss)             Hip ext/FF swing   2 2 2 2 2 2     SLS w UE mvmt  AROM/ball toss             Weight shifting             UE Noodle work x 4   4 4 4' 4 1 1 1     Shoulder rolls ant/post 10x each 2' 2' 2' 2 D/c hep       UE AROM 4' (10x each) 4' 4 4 4 4 CB 4 4     Resistive UE work (paddles, bells, TB)      2' isaac 2 2     Core work on noodle (sitting/stdg)             Sit on noodle with movement             Seated on pool bench w proper posture 1 1 1 1 1 1 1 1     Ankle df/pf 1 1 1 1 1 1 1 1     marching 1 1 1 1 1 1 1 1      DWHip Ab/add 1 1 1 1 1 1 1 1     Knee flex/ext 1 1 1 1 1 1 1 1     Deep water mvmt  4 4 4 4 5 5 5     Deep water tx/stretching nv as dima 8 8 8 8 8 8 8 Specific self - stretches wall/steps  2 2 2 2 2 2 2         Modalities  6/16 6/27 6/30 7/7 7/19 7/21 7/26 8/3     Whirlpool  With thumb AROM 10  5 8 10 10 10 10 10 10

## 2022-08-05 ENCOUNTER — OFFICE VISIT (OUTPATIENT)
Dept: PHYSICAL THERAPY | Age: 77
End: 2022-08-05
Payer: COMMERCIAL

## 2022-08-05 DIAGNOSIS — M54.6 ACUTE BILATERAL THORACIC BACK PAIN: ICD-10-CM

## 2022-08-05 DIAGNOSIS — M79.641 PAIN IN RIGHT HAND: ICD-10-CM

## 2022-08-05 DIAGNOSIS — M54.50 ACUTE BILATERAL LOW BACK PAIN, UNSPECIFIED WHETHER SCIATICA PRESENT: ICD-10-CM

## 2022-08-05 DIAGNOSIS — M54.2 NECK PAIN: Primary | ICD-10-CM

## 2022-08-05 PROCEDURE — 97150 GROUP THERAPEUTIC PROCEDURES: CPT

## 2022-08-05 PROCEDURE — 97113 AQUATIC THERAPY/EXERCISES: CPT

## 2022-08-05 NOTE — PROGRESS NOTES
Daily Note     Today's date: 2022  Patient name: Bhupendra Thakur  : 1945  MRN: [id number]  Referring provider: Morgan Anthony  Dx:   Encounter Diagnosis     ICD-10-CM    1  Neck pain  M54 2    2  Acute bilateral thoracic back pain  M54 6    3  Acute bilateral low back pain, unspecified whether sciatica present  M54 50    4  Pain in right hand  M79 641        Start Time: 0900  Stop Time: 1000  Total time in clinic (min): 60 minutes    Subjective: Patient reports she is having increased cramping in her legs and is having some pain in her hips, notes her LBP is getting better  Objective: See treatment diary below      Assessment: Tolerated treatment fairly well, Pt shows improvement in her movements while in the water, it's easier for her to complete more ex's in the water w/ less pain  Patient is challenged with stretching in th water  Patient demonstrated fatigue post treatment and would benefit from continued PT   Patietn seen 1:1 30 minutes in therapy    Plan: Continue per plan of care       Precautions: Afib, DM, HTN, cardiac, R TKR, R TSR  Daily ex grid:     Exercise Diary   7/3 8/5    Water walking 10 10 10 10 10 10 10 10 10    Postural training             Gait training             Home exercise pgm/patient education 10            Wall: t/h raises 1 1 1 1 1 1 1 1 1    Hip abd/add 2 2 2 2 2 2 2 2 2    Marching 1 1 1 1 1 1 1 1 1    squats  1 1 1 1 1 1 1 1    Knee flex/ext 1 1 1 1 1 1 1 1 1    Step-ups (fwd/bkwd/ss)             Hip ext/FF swing   2 2 2 2 2 2 2    SLS w UE mvmt  AROM/ball toss             Weight shifting             UE Noodle work x 4   4 4 4' 4 1 1 1 1    Shoulder rolls ant/post 10x each 2' 2' 2' 2 D/c hep       UE AROM 4' (10x each) 4' 4 4 4 4 CB 4 4 4    Resistive UE work (paddles, bells, TB)      2' isaac 2 2 2    Core work on noodle (sitting/stdg)             Sit on noodle with movement             Seated on pool bench w proper posture 1 1 1 1 1 1 1 1 1    Ankle df/pf 1 1 1 1 1 1 1 1 1    marching 1 1 1 1 1 1 1 1 1     DWHip Ab/add 1 1 1 1 1 1 1 1 1    Knee flex/ext 1 1 1 1 1 1 1 1 1    Deep water mvmt  4 4 4 4 5 5 5 5    Deep water tx/stretching nv as dima 8 8 8 8 8 8 8 8    Specific self - stretches wall/steps  2 2 2 2 2 2 2 2        Modalities  6/16 6/27 6/30 7/7 7/19 7/21 7/26 8/3 8/5    Whirlpool  With thumb AROM 10  5 8 10 10 10 10 10 10 10

## 2022-08-15 LAB — INR PPP: 3

## 2022-08-16 ENCOUNTER — OFFICE VISIT (OUTPATIENT)
Dept: PHYSICAL THERAPY | Age: 77
End: 2022-08-16
Payer: COMMERCIAL

## 2022-08-16 DIAGNOSIS — M54.2 NECK PAIN: Primary | ICD-10-CM

## 2022-08-16 DIAGNOSIS — M54.50 ACUTE BILATERAL LOW BACK PAIN, UNSPECIFIED WHETHER SCIATICA PRESENT: ICD-10-CM

## 2022-08-16 DIAGNOSIS — M54.6 ACUTE BILATERAL THORACIC BACK PAIN: ICD-10-CM

## 2022-08-16 DIAGNOSIS — M79.641 PAIN IN RIGHT HAND: ICD-10-CM

## 2022-08-16 PROCEDURE — 97113 AQUATIC THERAPY/EXERCISES: CPT | Performed by: PHYSICAL THERAPIST

## 2022-08-16 NOTE — PROGRESS NOTES
Daily Note     Today's date: 2022  Patient name: Pravin Thomas  : 1945  MRN: [id number]  Referring provider: Fernando Willoughby  Dx:   Encounter Diagnosis     ICD-10-CM    1  Neck pain  M54 2    2  Acute bilateral thoracic back pain  M54 6    3  Acute bilateral low back pain, unspecified whether sciatica present  M54 50    4  Pain in right hand  M79 641        Start Time: 1105  Stop Time: 1211  Total time in clinic (min): 66 minutes    Subjective: Notes 5/10 back and neck pain  Good and bad days  Was away so unable to attend PT or get in a pool while away  Objective: See treatment diary below      Assessment: Tolerated treatment well  Patient with difficulty with right hip abduction thus modified exercises with better tolerance  Ambulation with SPC  Improved mobility post aquatic exercise  Plan: Continue per plan of care  Prep for discharge       Precautions: Afib, DM, HTN, cardiac, R TKR, R TSR  Daily ex grid:     Exercise Diary  6/16 6/27 6/30 7/7 7/19 7/21 7/26 8/3 8/5 8/16   Water walking 10 10 10 10 10 10 10 10 10 10   HEP/patient education 10            Wall: t/h raises 1 1 1 1 1 1 1 1 1 1   Hip abd/add 2 2 2 2 2 2 2 2 2 2   Marching 1 1 1 1 1 1 1 1 1 1   squats  1 1 1 1 1 1 1 1 1   Knee flex/ext 1 1 1 1 1 1 1 1 1 1   Step-ups (fwd/bkwd/ss)             Hip ext/FF swing   2 2 2 2 2 2 2 2   SLS w UE mvmt  AROM/ball toss             UE Noodle work x 4   4 4 4' 4 1 1 1 1 1   Shoulder rolls ant/post 10x each 2' 2' 2' 2 D/c hep       UE AROM 4' (10x each) 4' 4 4 4 4 CB 4 4 4 4 OB   Resistive UE work (paddles, bells, TB)      2' isaac 2 2 2 2   Core work on noodle (sitting/stdg)             Sit on noodle with movement             Seated on pool bench w proper posture 1 1 1 1 1 1 1 1 1 1   Ankle df/pf 1 1 1 1 1 1 1 1 1 1   marching 1 1 1 1 1 1 1 1 1 1    DWHip Ab/add 1 1 1 1 1 1 1 1 1 1   Knee flex/ext 1 1 1 1 1 1 1 1 1 1   Deep water mvmt  4 4 4 4 5 5 5 5 5   Deep water tx/stretching nv as dima 8 8 8 8 8 8 8 8 8   Specific self - stretches wall/steps  2 2 2 2 2 2 2 2 2       Modalities  6/16 6/27 6/30 7/7 7/19 7/21 7/26 8/3 8/5 8/16   Whirlpool  With thumb AROM 10  5 8 10 10 10 10 10 10 10 10

## 2022-08-18 ENCOUNTER — OFFICE VISIT (OUTPATIENT)
Dept: PHYSICAL THERAPY | Age: 77
End: 2022-08-18
Payer: COMMERCIAL

## 2022-08-18 DIAGNOSIS — M54.6 ACUTE BILATERAL THORACIC BACK PAIN: ICD-10-CM

## 2022-08-18 DIAGNOSIS — M79.641 PAIN IN RIGHT HAND: ICD-10-CM

## 2022-08-18 DIAGNOSIS — M54.2 NECK PAIN: Primary | ICD-10-CM

## 2022-08-18 DIAGNOSIS — M54.50 ACUTE BILATERAL LOW BACK PAIN, UNSPECIFIED WHETHER SCIATICA PRESENT: ICD-10-CM

## 2022-08-18 PROCEDURE — 97113 AQUATIC THERAPY/EXERCISES: CPT

## 2022-08-18 NOTE — PROGRESS NOTES
Daily Note     Today's date: 2022  Patient name: Daina Fournier  : 1945  MRN: [id number]  Referring provider: Caesar Campos  Dx:   Encounter Diagnosis     ICD-10-CM    1  Neck pain  M54 2    2  Acute bilateral thoracic back pain  M54 6    3  Acute bilateral low back pain, unspecified whether sciatica present  M54 50        Start Time: 1100  Stop Time: 1200  Total time in clinic (min): 60 minutes    Subjective: pt notes it's a bad day  She notes she woke up w/ sig soreness and pain in LB into B hips  She states she didn't do anything sig the other day  She notes some relief in the water  Objective: See treatment diary below      Assessment: Tolerated treatment fair  High pain levels today w/ guarded sow movements  Cueing for relaxation throughout session  Some releif noted while in the water  Patient would benefit from continued PT      Plan: Continue per plan of care        Precautions: Afib, DM, HTN, cardiac, R TKR, R TSR  Daily ex grid:     Exercise Diary  8/18  6/30 7/7 7/19 7/21 7/26 8/3 8/5 8/16   Water walking 10 10 10 10 10 10 10 10 10 10   HEP/patient education             Wall: t/h raises 1 1 1 1 1 1 1 1 1 1   Hip abd/add 2 2 2 2 2 2 2 2 2 2   Marching 1 1 1 1 1 1 1 1 1 1   squats 1 1 1 1 1 1 1 1 1 1   Knee flex/ext 1 1 1 1 1 1 1 1 1 1                Hip ext/FF swing 2  2 2 2 2 2 2 2 2   SLS w UE mvmt  AROM/ball toss             UE Noodle work x 4  1 4 4 4' 4 1 1 1 1 1   Shoulder rolls ant/post  2' 2' 2' 2 D/c hep       UE AROM 4' CB 4' 4 4 4 4 CB 4 4 4 4 OB   Resistive UE work (paddles, bells, TB) 2' isaac     2' isaac 2 2 2 2   Core work on noodle (sitting/stdg)             Sit on noodle with movement             Seated on pool bench w proper posture 1 1 1 1 1 1 1 1 1 1   Ankle df/pf 1 1 1 1 1 1 1 1 1 1   marching 1 1 1 1 1 1 1 1 1 1    DWHip Ab/add 1 1 1 1 1 1 1 1 1 1   Knee flex/ext 1 1 1 1 1 1 1 1 1 1   Deep water mvmt 5 4 4 4 4 5 5 5 5 5   Deep water tx/stretching 8 8 8 8 8 8 8 8 8 8 Specific self - stretches wall/steps 2 2 2 2 2 2 2 2 2 2       Modalities  8/18 6/27 6/30 7/7 7/19 7/21 7/26 8/3 8/5 8/16   Whirlpool  With thumb AROM 10   8 10 10 10 10 10 10 10 10

## 2022-08-22 ENCOUNTER — OFFICE VISIT (OUTPATIENT)
Dept: PHYSICAL THERAPY | Age: 77
End: 2022-08-22
Payer: COMMERCIAL

## 2022-08-22 DIAGNOSIS — M54.2 NECK PAIN: Primary | ICD-10-CM

## 2022-08-22 DIAGNOSIS — M79.641 PAIN IN RIGHT HAND: ICD-10-CM

## 2022-08-22 DIAGNOSIS — M54.6 ACUTE BILATERAL THORACIC BACK PAIN: ICD-10-CM

## 2022-08-22 DIAGNOSIS — M54.50 ACUTE BILATERAL LOW BACK PAIN, UNSPECIFIED WHETHER SCIATICA PRESENT: ICD-10-CM

## 2022-08-22 PROCEDURE — 97113 AQUATIC THERAPY/EXERCISES: CPT

## 2022-08-22 NOTE — H&P CARDIOLOGY - EXTREMITIES
Seen on her lung cancer screening CTs  No fixed obstruction on pulmonary function test   Inhalers as above  No cyanosis, clubbing or edema

## 2022-08-22 NOTE — PROGRESS NOTES
Daily Note     Today's date: 2022  Patient name: Maurizio Miller  : 1945  MRN: [id number]  Referring provider: Carrie Roblero  Dx:   Encounter Diagnosis     ICD-10-CM    1  Neck pain  M54 2    2  Acute bilateral low back pain, unspecified whether sciatica present  M54 50    3  Acute bilateral thoracic back pain  M54 6    4  Pain in right hand  M79 641        Start Time: 1500  Stop Time: 1600  Total time in clinic (min): 60 minutes    Subjective: Patient reports increased today in her R hip and LB and thumb  Objective: See treatment diary below      Assessment: Tolerated treatment fairlyt well, patient was having difficulty holding onto the paddles due to increased R thumb pain  patient was unable to perform the push/pull due to increased pain  Patient was able to complete rest of program without modifications  Challenges with getting in/out of the pool stairs due to knee stiffness  Patient demonstrated fatigue post treatment and would benefit from continued PT      Plan: Continue per plan of care  Progress treatment as tolerated         Precautions: Afib, DM, HTN, cardiac, R TKR, R TSR  Daily ex grid:     Exercise Diary  8/18 8/22  7/7 7/19 7/21 7/26 8/3 8/5 8/16   Water walking 10 10 10 10 10 10 10 10 10 10   HEP/patient education             Wall: t/h raises 1 1 1 1 1 1 1 1 1 1   Hip abd/add 2 2 2 2 2 2 2 2 2 2    1 1 1 1 1 1 1 1 1 1   squats 1 1 1 1 1 1 1 1 1 1   Knee flex/ext 1 1 1 1 1 1 1 1 1 1                Hip FF swing 2 FF 2' 2 2 2 2 2 2 2 2   SLS w UE mvmt  AROM/ball toss             UE Noodle work x 4  1 1' 4 4' 4 1 1 1 1 1   Shoulder rolls ant/post   2' 2' 2 D/c hep       UE AROM 4' CB 4' 4 4 4 4 CB 4 4 4 4 OB   Resistive UE work (paddles, bells, TB) 2' isaac 2'    2' isaac 2 2 2 2   Core work on noodle (sitting/stdg)             Sit on noodle with movement             Seated on pool bench w proper posture 1 1 1 1 1 1 1 1 1 1   Ankle df/pf 1 1 1 1 1 1 1 1 1 1   marching 1 1 1 1 1 1 1 1 1 1    DWHip Ab/add 1 1 1 1 1 1 1 1 1 1   Knee flex/ext 1 1 1 1 1 1 1 1 1 1   Deep water mvmt 5 5 4 4 4 5 5 5 5 5   Deep water tx/stretching 8 8 8 8 8 8 8 8 8 8   Specific self - stretches wall/steps 2 2 2 2 2 2 2 2 2 2       Modalities  8/18 8/22 7/7 7/19 7/21 7/26 8/3 8/5 8/16   Whirlpool  With thumb AROM 10   8 10 10 10 10 10 10 10 10

## 2022-08-22 NOTE — PROVIDER CONTACT NOTE (OTHER) - RECOMMENDATIONS
np to assess Calcipotriene Pregnancy And Lactation Text: This medication has not been proven safe during pregnancy. It is unknown if this medication is excreted in breast milk.

## 2022-08-25 ENCOUNTER — APPOINTMENT (OUTPATIENT)
Dept: PHYSICAL THERAPY | Age: 77
End: 2022-08-25
Payer: COMMERCIAL

## 2022-08-29 ENCOUNTER — OFFICE VISIT (OUTPATIENT)
Dept: PHYSICAL THERAPY | Age: 77
End: 2022-08-29
Payer: COMMERCIAL

## 2022-08-29 DIAGNOSIS — M54.6 ACUTE BILATERAL THORACIC BACK PAIN: ICD-10-CM

## 2022-08-29 DIAGNOSIS — M54.2 NECK PAIN: Primary | ICD-10-CM

## 2022-08-29 DIAGNOSIS — M54.50 ACUTE BILATERAL LOW BACK PAIN, UNSPECIFIED WHETHER SCIATICA PRESENT: ICD-10-CM

## 2022-08-29 DIAGNOSIS — M79.641 PAIN IN RIGHT HAND: ICD-10-CM

## 2022-08-29 PROCEDURE — 97113 AQUATIC THERAPY/EXERCISES: CPT

## 2022-08-29 NOTE — PROGRESS NOTES
Daily Note     Today's date: 2022  Patient name: Angelica Chavis  : 1945  MRN: [id number]  Referring provider: Tatyana Resendiz  Dx:   Encounter Diagnosis     ICD-10-CM    1  Neck pain  M54 2    2  Acute bilateral low back pain, unspecified whether sciatica present  M54 50    3  Acute bilateral thoracic back pain  M54 6    4  Pain in right hand  M79 641        Start Time: 1100  Stop Time: 1200  Total time in clinic (min): 60 minutes    Subjective: pt notes last week she had a really bad day and was unable to do much of anything  Today is a better day but she continues w/ sig R hip pain w/ gentle ex's and movements to R LE  Pt notes after PT sessions she usually can move a lot better at home  Objective: See treatment diary below      Assessment: Tolerated treatment fair  Slow progress w/ aquatic PT  pt continues w/ pain levels in LB, R hip, neck and B hands  Good relief after her session  Patient would benefit from continued PT      Plan: Continue per plan of care        Precautions: Afib, DM, HTN, cardiac, R TKR, R TSR  Daily ex grid:     Exercise Diary  8/18 8/22 8/29  7/19 7/21 7/26 8/3 8/5 8/16   Water walking 10 10 10 10 10 10 10 10 10 10   HEP/patient education             Wall: t/h raises 1 1 1 1 1 1 1 1 1 1   Hip abd/add 2 2 2 2 2 2 2 2 2 2   Marching 1 1 1 1 1 1 1 1 1 1   squats 1 1 1 1 1 1 1 1 1 1   Knee flex/ext 1 1 1 1 1 1 1 1 1 1                Hip FF swing 2 FF 2' 2 2 2 2 2 2 2 2   SLS w UE mvmt  AROM/ball toss             UE Noodle work x 4  1 1' 1 4' 4 1 1 1 1 1   Shoulder rolls ant/post    2' 2 D/c hep       UE AROM 4' CB 4' 4 4 4 4 CB 4 4 4 4 OB   Resistive UE work (paddles, bells, TB) 2' isaac 2' 2   2' isaac 2 2 2 2   Core work on noodle (sitting/stdg)             Sit on noodle with movement             Seated on pool bench w proper posture 1 1 1 1 1 1 1 1 1 1   Ankle df/pf 1 1 1 1 1 1 1 1 1 1   marching 1 1 1 1 1 1 1 1 1 1    DWHip Ab/add 1 1 1 1 1 1 1 1 1 1   Knee flex/ext 1 1 1 1 1 1 1 1 1 1   Deep water mvmt 5 5 5 4 4 5 5 5 5 5   Deep water tx/stretching 8 8 8 8 8 8 8 8 8 8   Specific self - stretches wall/steps 2 2 2 2 2 2 2 2 2 2       Modalities  8/18 8/22 8/29 7/7 7/19 7/21 7/26 8/3 8/5 8/16   Whirlpool  With thumb AROM 10   8 10 10 10 10 10 10 10 10

## 2022-09-01 ENCOUNTER — EVALUATION (OUTPATIENT)
Dept: PHYSICAL THERAPY | Age: 77
End: 2022-09-01
Payer: COMMERCIAL

## 2022-09-01 DIAGNOSIS — M54.6 ACUTE BILATERAL THORACIC BACK PAIN: ICD-10-CM

## 2022-09-01 DIAGNOSIS — M79.641 PAIN IN RIGHT HAND: ICD-10-CM

## 2022-09-01 DIAGNOSIS — M54.50 ACUTE BILATERAL LOW BACK PAIN, UNSPECIFIED WHETHER SCIATICA PRESENT: ICD-10-CM

## 2022-09-01 DIAGNOSIS — M54.2 NECK PAIN: Primary | ICD-10-CM

## 2022-09-01 PROCEDURE — 97530 THERAPEUTIC ACTIVITIES: CPT | Performed by: PHYSICAL THERAPIST

## 2022-09-01 PROCEDURE — 97110 THERAPEUTIC EXERCISES: CPT | Performed by: PHYSICAL THERAPIST

## 2022-09-01 NOTE — PROGRESS NOTES
PT Re-Evaluation     Today's date: 2022  Patient name: Braeden Johnson  : 1945  MRN: [id number]  Referring provider: Shweta Monk  Dx:   Encounter Diagnosis     ICD-10-CM    1  Neck pain  M54 2    2  Acute bilateral low back pain, unspecified whether sciatica present  M54 50    3  Acute bilateral thoracic back pain  M54 6    4  Pain in right hand  M79 641        Start Time: 1445  Stop Time: 1542  Total time in clinic (min): 57 minutes    Assessment  Assessment details: Patient has been seen in PT for 18 visits total  She continues to have good and bad days regarding pain  Some increase in right sided LB and hip over the past month  Notes decreased spasms in neck and back  Functionally, she continues to do her daily activities with pain that varies  Generally better post aquatic PT  Patient has joined a [employer] that has a pool to continue with exercises  She has returned to [employer] and gardening  At this time, patient has achieved their maximum functional benefit from skilled physical therapy services and will be discharged to their [facility]  Patient is in agreement with the plan of care  As a result, patient is discharged from physical therapy  She has been advised to contact her physician regarding right thumb and right hip discomfort  PT/ TE with ROM and strength assessment  TA - patient education posture, self care  Impairments: abnormal gait, abnormal or restricted ROM, abnormal movement, activity intolerance, impaired balance, impaired physical strength, lacks appropriate home exercise program, pain with function, weight-bearing intolerance, poor posture  and poor body mechanics  Understanding of Dx/Px/POC: good   Prognosis: good    Goals  ST-3 WEEKS  1  Decrease pain by 2-3 points on VAS at its worst MET  2  Increase ROM by > 5-10 deg in all deficients planes  MET  3  Increase UE, LE and core by 1/2 MMT grade in all deficient planes  WORKING TOWARDS    LT-6 WEEKS  1   Patient to be independent with a/iadls  2  Increase functional activities for leisure and home activities including return to crocheting  WORKING TOWARDS  3  Independent with HEP and/or fitness program  - MET    Plan  Patient would benefit from: skilled physical therapy  Planned modality interventions: cryotherapy and thermotherapy: hydrocollator packs  Planned therapy interventions: activity modification, behavior modification, body mechanics training, aquatic therapy, home exercise program, joint mobilization, manual therapy, neuromuscular re-education, patient education, postural training, therapeutic activities and therapeutic exercise  Frequency: 2-3x week  Duration in weeks: 6  Plan of Care beginning date: 2022  Plan of Care expiration date: 2022  Treatment plan discussed with: patient        Subjective Evaluation    History of Present Illness  Date of onset: 2022  Mechanism of injury: 22 Patient notes she is having good and bad days  Notes feels good with aquatic PT and generally has relief post therapy  Notes right hip pain that interrupts sleep and seems worse over the past month  Notes injection right thumb was helpful but notes some difficulty with holding objects and is dropping things lately  Notes when she pushes activity level right hip will be aggravated  Notes good and bad days with her neck  Still has difficulty turning while driving  Has returned to gardening with modifications  Crocheting with difficulty   Participating in pool independent exercsei [place]  Pain  Current pain ratin (8/10 thumb)  At best pain rating: 3 (2/10)  At worst pain ratin (thumb 8/10)  Location: neck and entire back bilaterally;  Quality: sharp, tight and dull ache  Relieving factors: heat  Aggravating factors: sitting, standing and walking (turning head; prolonged positioning)  Progression: improved    Social Support  Lives with: adult children    Hand dominance: right    Patient Goals  Patient goals for therapy: independence with ADLs/IADLs, decreased pain, increased motion and increased strength  Patient goal: return to [employer]; return ot rosendo; return to gardening        Objective     Static Posture     Head  Forward  Shoulders  Rounded  Scapulae  Right elevated  Lumbar Spine   Decreased lordosis  Palpation   Left   Hypertonic in the levator scapulae, sternocleidomastoid and upper trapezius  Tenderness of the levator scapulae, rhomboids and upper trapezius  Right   Hypertonic in the levator scapulae, sternocleidomastoid and upper trapezius  Tenderness of the levator scapulae, rhomboids and upper trapezius  Tenderness     Right Wrist/Hand   Tenderness in the scaphoid  Left Hip   Tenderness in the PSIS  Right Hip   Tenderness in the PSIS  Neurological Testing     Sensation   Cervical/Thoracic   Left   Intact: light touch    Right   Intact: light touch  Diminished: light touch    Comments   Right light touch: tip of thumb       Lumbar   Left   Intact: light touch    Right   Intact: light touch    Active Range of Motion   Cervical/Thoracic Spine       Cervical    Flexion:  WFL  Extension: 35 degrees      Left lateral flexion: 15 degrees     with pain  Right lateral flexion: 20 degrees      Left rotation: 45 degrees with pain  Right rotation: 50 degrees    with pain    Left Wrist   Wrist flexion: WFL  Wrist extension: WFL  Radial deviation: WFL  Ulnar deviation: WFL      Right Wrist   Wrist flexion: WFL  Wrist extension: WFl  Radial deviation: WFL  Ulnar deviation: WFL    Right Thumb   Flexion     MP: 25    DIP: 15  Extension     CMC: 0    MP: 0    DIP: 0  Opposition: -1 cm from 5th digit    Lumbar   Flexion: 65 degrees   Extension: 15 degrees  with pain  Left lateral flexion:  Restriction level: moderate  Right lateral flexion:  Restriction level: moderate    Strength/Myotome Testing     Left Shoulder     Planes of Motion   Flexion: 4+ Abduction: 4+     Right Shoulder     Planes of Motion   Flexion: 3+   Abduction: 3+     Left Elbow   Flexion: 4+  Extension: 4+    Right Elbow   Flexion: 4+  Extension: 4+    Left Wrist/Hand   Wrist extension: 4  Wrist flexion: 4  Radial deviation: 4+  Ulnar deviation: 4+  Thumb extension: 5     (2nd hand position)     Trial 1: 13    Trial 2: 11    Trial 3: 15    Thumb Strength  Key/Lateral Pinch     Trial 1: 8    Trial 2: 8    Trial 3: 8    Average: 8    Right Wrist/Hand   Wrist extension: 4  Wrist flexion: 4  Radial deviation: 4-  Ulnar deviation: 4-  Thumb extension: 3     (2nd hand position)     Trial 1: 6    Trial 2: 8    Trial 3: 6    Thumb Strength   Key/Lateral Pinch     Trial 1: 7    Trial 2: 7    Trial 3: 7    Average: 7    Left Hip   Planes of Motion   Flexion: 4  Abduction: 4  Adduction: 4    Right Hip   Planes of Motion   Flexion: 4-  Abduction: 4-  Adduction: 4-    Left Knee   Flexion: 4  Extension: 4    Right Knee   Flexion: 4  Extension: 4    Left Ankle/Foot   Dorsiflexion: 4  Plantar flexion: 5    Right Ankle/Foot   Dorsiflexion: 5  Plantar flexion: 5    Ambulation   Weight-Bearing Status   Assistive device used: single point cane    Ambulation: Level Surfaces   Ambulation with assistive device: independent    Functional Assessment        Comments  Ambulation with SPC with decreased steadiness and difficulty holding cane in right hand - Notes now feels better holding cane (9/1/22) using less pressure on thumb  Unable to rosendo -  Daily activities at home more challenging - requires rest periods  Difficulty reading due to neck and thumb pain  No participation in community exercise program      9/1/22   Notes now feels better holding cane (9/1/22) using less pressure on thumb  Crocheting however will drop hook at times  Improved readingtolerance  Started a community aquatic program      Flowsheet Rows    Flowsheet Row Most Recent Value   PT/OT G-Codes    Current Score 44   Projected Score 39

## 2022-09-06 LAB — INR PPP: 4.6

## 2022-09-12 ENCOUNTER — RX RENEWAL (OUTPATIENT)
Age: 77
End: 2022-09-12

## 2022-09-19 LAB — INR PPP: 3

## 2022-10-13 ENCOUNTER — RX RENEWAL (OUTPATIENT)
Age: 77
End: 2022-10-13

## 2022-10-24 ENCOUNTER — RX RENEWAL (OUTPATIENT)
Age: 77
End: 2022-10-24

## 2022-10-24 ENCOUNTER — APPOINTMENT (OUTPATIENT)
Dept: FAMILY MEDICINE | Facility: CLINIC | Age: 77
End: 2022-10-24

## 2022-10-24 VITALS
BODY MASS INDEX: 43.61 KG/M2 | DIASTOLIC BLOOD PRESSURE: 74 MMHG | WEIGHT: 231 LBS | SYSTOLIC BLOOD PRESSURE: 134 MMHG | HEART RATE: 74 BPM | HEIGHT: 61 IN | RESPIRATION RATE: 16 BRPM | TEMPERATURE: 97.5 F | OXYGEN SATURATION: 99 %

## 2022-10-24 DIAGNOSIS — N18.9 CHRONIC KIDNEY DISEASE, UNSPECIFIED: ICD-10-CM

## 2022-10-24 DIAGNOSIS — Z23 ENCOUNTER FOR IMMUNIZATION: ICD-10-CM

## 2022-10-24 DIAGNOSIS — M79.643 PAIN IN UNSPECIFIED HAND: ICD-10-CM

## 2022-10-24 PROCEDURE — 99215 OFFICE O/P EST HI 40 MIN: CPT

## 2022-10-24 RX ORDER — CIPROFLOXACIN HYDROCHLORIDE 250 MG/1
250 TABLET, FILM COATED ORAL
Qty: 14 | Refills: 0 | Status: DISCONTINUED | COMMUNITY
Start: 2022-07-27 | End: 2022-10-24

## 2022-10-24 RX ORDER — DIAZEPAM 5 MG/1
5 TABLET ORAL
Qty: 1 | Refills: 0 | Status: DISCONTINUED | COMMUNITY
Start: 2022-04-05 | End: 2022-10-24

## 2022-10-25 ENCOUNTER — NON-APPOINTMENT (OUTPATIENT)
Age: 77
End: 2022-10-25

## 2022-10-25 ENCOUNTER — APPOINTMENT (OUTPATIENT)
Dept: CARDIOLOGY | Facility: CLINIC | Age: 77
End: 2022-10-25

## 2022-10-25 ENCOUNTER — APPOINTMENT (OUTPATIENT)
Dept: ORTHOPEDIC SURGERY | Facility: CLINIC | Age: 77
End: 2022-10-25

## 2022-10-25 ENCOUNTER — RX RENEWAL (OUTPATIENT)
Age: 77
End: 2022-10-25

## 2022-10-25 VITALS
DIASTOLIC BLOOD PRESSURE: 80 MMHG | SYSTOLIC BLOOD PRESSURE: 140 MMHG | HEIGHT: 61 IN | HEART RATE: 88 BPM | RESPIRATION RATE: 17 BRPM | BODY MASS INDEX: 43.43 KG/M2 | TEMPERATURE: 97.3 F | WEIGHT: 230 LBS | OXYGEN SATURATION: 97 %

## 2022-10-25 DIAGNOSIS — S63.641A SPRAIN OF METACARPOPHALANGEAL JOINT OF RIGHT THUMB, INITIAL ENCOUNTER: ICD-10-CM

## 2022-10-25 PROCEDURE — 20605 DRAIN/INJ JOINT/BURSA W/O US: CPT

## 2022-10-25 PROCEDURE — 93000 ELECTROCARDIOGRAM COMPLETE: CPT

## 2022-10-25 PROCEDURE — 99215 OFFICE O/P EST HI 40 MIN: CPT

## 2022-10-25 PROCEDURE — 99213 OFFICE O/P EST LOW 20 MIN: CPT | Mod: 25

## 2022-10-25 PROCEDURE — 99072 ADDL SUPL MATRL&STAF TM PHE: CPT

## 2022-10-25 NOTE — END OF VISIT
[FreeTextEntry3] : All medical record entries made by the Scribe were at my,  Dr. Miguel Mercado MD., direction and personally dictated by me on 10/25/2022. I have personally reviewed the chart and agree that the record accurately reflects my personal performance of the history, physical exam, assessment and plan.

## 2022-10-25 NOTE — PHYSICAL EXAM
[de-identified] : Patient is WDWN, alert, and in no acute distress. Breathing is unlabored. She is grossly oriented to person, place, and time.\par \par Right Hand (Thumb):\par No instability of the UCL to the MP joint of the right thumb upon stress testing. \par No residual swelling to the thumb\par There is a full arc of motion to the right thumb.\par Mild tenderness noted over the A1 pulley.\par Basal joint tenderness, with a positive grind test. \par Sensation is normal and intact.  [de-identified] : EXAM:  MR THUMB RT\par PROCEDURE DATE:  04/05/2022\par IMPRESSION:\par 1.  Low-grade partial-thickness tear of the dorsal attachment of the ulnar collateral ligament of the first MCP joint, as described above.\par 2.  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 Apr 10 2022  2:57PM
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
## 2022-10-25 NOTE — DISCUSSION/SUMMARY
[FreeTextEntry1] : The underlying pathophysiology was reviewed with the patient. XR films were reviewed with the patient. Discussed at length the nature of the patient’s condition. The right thumb symptoms appear secondary to possible UCL tear.\par \par At this time, I discussed with her that her residual symptoms are seemingly emanating from her CMC joint secondary to arthritis. I therefore recommended she soak the hand in warm water and Epsom salts for relief as well as a repeat cortisone injection to the CMC joint of the right thumb.\par \par The patient wishes to proceed with a cortisone injection at this time. Under sterile precautions, an injection of 0.5 cc 1% lidocaine with 0.25 cc of Kenalog and 0.25 cc of Dexamethasone was administered into the RIGHT basal joint (2). The patient tolerated the procedure well. Rest and apply ice. \par \par All questions answered, understanding verbalized. Patient in agreement with plan of care. Follow up as needed.

## 2022-10-25 NOTE — ADDENDUM
[FreeTextEntry1] : I, Isamar Fox wrote this note acting as a scribe for Dr. Miguel Mercado on Oct 25, 2022.

## 2022-10-27 NOTE — PHYSICAL EXAM
[No Acute Distress] : no acute distress [Well Nourished] : well nourished [Well Developed] : well developed [Well-Appearing] : well-appearing [Normal Sclera/Conjunctiva] : normal sclera/conjunctiva [PERRL] : pupils equal round and reactive to light [EOMI] : extraocular movements intact [Normal Outer Ear/Nose] : the outer ears and nose were normal in appearance [Supple] : supple [No Respiratory Distress] : no respiratory distress  [No Accessory Muscle Use] : no accessory muscle use [Clear to Auscultation] : lungs were clear to auscultation bilaterally [Normal Rate] : normal rate  [Regular Rhythm] : with a regular rhythm [Normal S1, S2] : normal S1 and S2 [No Murmur] : no murmur heard [No Edema] : there was no peripheral edema [Normal Posterior Cervical Nodes] : no posterior cervical lymphadenopathy [Normal Anterior Cervical Nodes] : no anterior cervical lymphadenopathy [No CVA Tenderness] : no CVA  tenderness [No Spinal Tenderness] : no spinal tenderness [No Joint Swelling] : no joint swelling [Grossly Normal Strength/Tone] : grossly normal strength/tone [No Rash] : no rash [Coordination Grossly Intact] : coordination grossly intact [No Focal Deficits] : no focal deficits [Normal Gait] : normal gait [Deep Tendon Reflexes (DTR)] : deep tendon reflexes were 2+ and symmetric [Normal Affect] : the affect was normal [Normal Insight/Judgement] : insight and judgment were intact [de-identified] : cast on right hand

## 2022-10-27 NOTE — HEALTH RISK ASSESSMENT
[Never] : Never [No] : In the past 12 months have you used drugs other than those required for medical reasons? No [No falls in past year] : Patient reported no falls in the past year [0] : 2) Feeling down, depressed, or hopeless: Not at all (0) [PHQ-2 Negative - No further assessment needed] : PHQ-2 Negative - No further assessment needed [With Patient/Caregiver] : , with patient/caregiver [GIB4Cnpuf] : 0 [AdvancecareDate] : 05/22

## 2022-10-27 NOTE — REVIEW OF SYSTEMS
[Joint Pain] : joint pain [Anxiety] : anxiety [Depression] : depression [Negative] : Heme/Lymph [Suicidal] : not suicidal [Insomnia] : no insomnia

## 2022-10-27 NOTE — PLAN
[FreeTextEntry1] :  renal diabetic diet. \par  cardiology  note reviewed. pt. will contact GI for alternate prep for colonoscopy. \par f/u hematology re: Factor 5 Leiden.\par Continue current meds. \par  Labs ordered. \par f/u GI.

## 2022-11-07 LAB — INR PPP: 3.5

## 2022-12-09 LAB — INR PPP: 4

## 2022-12-16 ENCOUNTER — RX RENEWAL (OUTPATIENT)
Age: 77
End: 2022-12-16

## 2022-12-20 ENCOUNTER — RX RENEWAL (OUTPATIENT)
Age: 77
End: 2022-12-20

## 2022-12-24 NOTE — ED ADULT TRIAGE NOTE - HEIGHT IN CM
REVIEW OF SYSTEMS:    CONSTITUTIONAL: No weakness, fevers or chills  EYES/ENT: No visual changes;  No vertigo or throat pain   NECK: No pain or stiffness  RESPIRATORY: No cough, wheezing, hemoptysis; No shortness of breath  CARDIOVASCULAR: No chest pain or palpitations  GASTROINTESTINAL: No abdominal or epigastric pain. No nausea, vomiting, or hematemesis; No diarrhea or constipation. No melena or hematochezia.  GENITOURINARY: No dysuria, frequency or hematuria  NEUROLOGICAL: No numbness or weakness  SKIN: No itching, burning, rashes, or lesions   All other review of systems is negative unless indicated above. REVIEW OF SYSTEMS:    CONSTITUTIONAL: Multipe shocks from ICD  EYES/ENT: No visual changes;  No vertigo or throat pain   NECK: No pain or stiffness  RESPIRATORY: No cough, wheezing, hemoptysis; No shortness of breath  CARDIOVASCULAR: Multiple shocks from ICD  GASTROINTESTINAL: No abdominal or epigastric pain. No nausea, vomiting, or hematemesis.  GENITOURINARY: No dysuria, frequency or hematuria  NEUROLOGICAL: No numbness or weakness  SKIN: No itching, burning, rashes, or lesions   All other review of systems is negative unless indicated above. 157.48

## 2023-01-05 LAB — INR PPP: 2.3

## 2023-01-11 ENCOUNTER — RX RENEWAL (OUTPATIENT)
Age: 78
End: 2023-01-11

## 2023-01-12 ENCOUNTER — APPOINTMENT (OUTPATIENT)
Dept: FAMILY MEDICINE | Facility: CLINIC | Age: 78
End: 2023-01-12

## 2023-02-09 ENCOUNTER — RX RENEWAL (OUTPATIENT)
Age: 78
End: 2023-02-09

## 2023-02-16 ENCOUNTER — APPOINTMENT (OUTPATIENT)
Dept: FAMILY MEDICINE | Facility: CLINIC | Age: 78
End: 2023-02-16
Payer: MEDICARE

## 2023-02-16 VITALS
TEMPERATURE: 97.3 F | HEIGHT: 61 IN | SYSTOLIC BLOOD PRESSURE: 128 MMHG | DIASTOLIC BLOOD PRESSURE: 74 MMHG | WEIGHT: 228 LBS | HEART RATE: 74 BPM | OXYGEN SATURATION: 96 % | BODY MASS INDEX: 43.05 KG/M2 | RESPIRATION RATE: 18 BRPM

## 2023-02-16 DIAGNOSIS — F41.9 ANXIETY DISORDER, UNSPECIFIED: ICD-10-CM

## 2023-02-16 DIAGNOSIS — M54.40 LUMBAGO WITH SCIATICA, UNSPECIFIED SIDE: ICD-10-CM

## 2023-02-16 DIAGNOSIS — M54.9 DORSALGIA, UNSPECIFIED: ICD-10-CM

## 2023-02-16 PROCEDURE — 99215 OFFICE O/P EST HI 40 MIN: CPT

## 2023-02-16 RX ORDER — CEFUROXIME AXETIL 500 MG/1
500 TABLET ORAL
Qty: 14 | Refills: 0 | Status: DISCONTINUED | COMMUNITY
Start: 2022-04-30 | End: 2023-02-16

## 2023-02-16 RX ORDER — PREDNISOLONE ACETATE 10 MG/ML
1 SUSPENSION/ DROPS OPHTHALMIC
Qty: 5 | Refills: 0 | Status: DISCONTINUED | COMMUNITY
Start: 2022-05-02 | End: 2023-02-16

## 2023-02-16 RX ORDER — NYSTATIN 100000 [USP'U]/G
100000 CREAM TOPICAL TWICE DAILY
Qty: 1 | Refills: 0 | Status: DISCONTINUED | COMMUNITY
Start: 2022-12-02 | End: 2023-02-16

## 2023-02-16 RX ORDER — NEOMYCIN AND POLYMYXIN B SULFATES AND DEXAMETHASONE 3.5; 10000; 1 MG/G; [IU]/G; MG/G
3.5-10000-0.1 OINTMENT OPHTHALMIC
Qty: 4 | Refills: 0 | Status: DISCONTINUED | COMMUNITY
Start: 2022-05-02 | End: 2023-02-16

## 2023-02-16 RX ORDER — POTASSIUM CHLORIDE 1500 MG/1
20 TABLET, FILM COATED, EXTENDED RELEASE ORAL
Qty: 90 | Refills: 1 | Status: ACTIVE | COMMUNITY
Start: 2019-01-25 | End: 1900-01-01

## 2023-02-16 RX ORDER — DOXYCYCLINE 100 MG/1
100 TABLET, FILM COATED ORAL
Qty: 60 | Refills: 0 | Status: DISCONTINUED | COMMUNITY
Start: 2022-05-02 | End: 2023-02-16

## 2023-02-16 RX ORDER — CIPROFLOXACIN HYDROCHLORIDE 250 MG/1
250 TABLET, FILM COATED ORAL
Qty: 14 | Refills: 0 | Status: DISCONTINUED | COMMUNITY
Start: 2022-11-18 | End: 2023-02-16

## 2023-02-16 RX ORDER — TRIAMCINOLONE ACETONIDE 1 MG/G
0.1 OINTMENT TOPICAL TWICE DAILY
Qty: 1 | Refills: 1 | Status: DISCONTINUED | COMMUNITY
Start: 2022-01-19 | End: 2023-02-16

## 2023-02-18 NOTE — PHYSICAL EXAM
[No Acute Distress] : no acute distress [Well Nourished] : well nourished [Well Developed] : well developed [Well-Appearing] : well-appearing [Normal Sclera/Conjunctiva] : normal sclera/conjunctiva [PERRL] : pupils equal round and reactive to light [EOMI] : extraocular movements intact [Normal Outer Ear/Nose] : the outer ears and nose were normal in appearance [Supple] : supple [No Respiratory Distress] : no respiratory distress  [No Accessory Muscle Use] : no accessory muscle use [Clear to Auscultation] : lungs were clear to auscultation bilaterally [Normal Rate] : normal rate  [Regular Rhythm] : with a regular rhythm [Normal S1, S2] : normal S1 and S2 [No Murmur] : no murmur heard [No Edema] : there was no peripheral edema [No CVA Tenderness] : no CVA  tenderness [No Spinal Tenderness] : no spinal tenderness [No Joint Swelling] : no joint swelling [Grossly Normal Strength/Tone] : grossly normal strength/tone [No Rash] : no rash [Coordination Grossly Intact] : coordination grossly intact [No Focal Deficits] : no focal deficits [Normal Gait] : normal gait [Deep Tendon Reflexes (DTR)] : deep tendon reflexes were 2+ and symmetric [Normal Affect] : the affect was normal [Normal Insight/Judgement] : insight and judgment were intact [Comprehensive Foot Exam Normal] : Right and left foot were examined and both feet are normal. No ulcers in either foot. Toes are normal and with full ROM.  Normal tactile sensation with monofilament testing throughout both feet [de-identified] : low back discomfort

## 2023-02-18 NOTE — PLAN
[FreeTextEntry1] :  diabetic diet. Tylenol for pain , heat therapy, PT referral.\par  cardiology  note reviewed. pt. will contact GI .\par f/u hematology re: Factor 5 Leiden.\par Continue current meds. \par  Labs ordered. \par

## 2023-02-18 NOTE — HISTORY OF PRESENT ILLNESS
[FreeTextEntry1] : see HPI  [de-identified] : Here for f/u Diabetes mellitus. She has been feeling sad due to her personal medical issues and worried about her jobless son who is  from his wife and  lives in patient's home in [facility] with his mentally ill child. Patient has not been eating healthy diet or exercise over last several weeks . Her fasting blood sugars are 130-170.  She is AAOX3,NAD. She is due for lab work. . She has gained 30 pounds since beginning of last year and is stress eating.\par  c/o low back pain , radiating to left leg. She is requesting PT referral. She had hearing problem and feeling like passing out with use of Cipro twice for UTI in past.

## 2023-02-18 NOTE — HEALTH RISK ASSESSMENT
[No] : In the past 12 months have you used drugs other than those required for medical reasons? No [No falls in past year] : Patient reported no falls in the past year [0] : 2) Feeling down, depressed, or hopeless: Not at all (0) [PHQ-2 Negative - No further assessment needed] : PHQ-2 Negative - No further assessment needed [With Patient/Caregiver] : , with patient/caregiver [Never] : Never [[id number]] : 0 [AdvancecareDate] : 02/23

## 2023-02-21 ENCOUNTER — APPOINTMENT (OUTPATIENT)
Dept: CARDIOLOGY | Facility: CLINIC | Age: 78
End: 2023-02-21
Payer: MEDICARE

## 2023-02-21 ENCOUNTER — NON-APPOINTMENT (OUTPATIENT)
Age: 78
End: 2023-02-21

## 2023-02-21 VITALS
BODY MASS INDEX: 43.05 KG/M2 | SYSTOLIC BLOOD PRESSURE: 176 MMHG | HEART RATE: 95 BPM | RESPIRATION RATE: 18 BRPM | WEIGHT: 228 LBS | DIASTOLIC BLOOD PRESSURE: 84 MMHG | HEIGHT: 61 IN | OXYGEN SATURATION: 99 %

## 2023-02-21 PROCEDURE — 93000 ELECTROCARDIOGRAM COMPLETE: CPT

## 2023-02-21 PROCEDURE — 99215 OFFICE O/P EST HI 40 MIN: CPT

## 2023-02-21 PROCEDURE — 93306 TTE W/DOPPLER COMPLETE: CPT

## 2023-02-21 NOTE — CARDIOLOGY SUMMARY
[___] : [unfilled] [de-identified] : Feb 21, 2023. Sinus  Rhythm  -First degree A-V block \par Jose = 260 msec\par -Left bundle branch block. \par \par ABNORMAL  [de-identified] : Jan 7, 2022. Normal LV function. Moderate aortic stenosis

## 2023-02-21 NOTE — DISCUSSION/SUMMARY
[FreeTextEntry1] : 77-year-old woman with CAD, status post remote coronary intervention, aortic stenosis, hypertension, factor V Leiden, anticoagulated with warfarin.\par Medical issues include sedentary lifestyle, obesity, diabetes.\par Blood pressure elevated on today's examination.  There is no JVD.  Lung fields are clear.  No edema.  She is euvolemic.  Cardiac murmur is unchanged from prior.\par EKG sinus rhythm.  First-degree AV block.  Left bundle branch block.\par Echocardiogram done in the office today.  Technically difficult study.  Normal left ventricular systolic function.  Moderate aortic stenosis.\par \par Plan\par 1.  Current medication list is reviewed, no changes.  Recognize elevated blood pressure, however, patient reports that she does have moderate pain secondary to sciatica.\par Blood pressure has been stable on recent doctor visits.\par 2.  Continue with warfarin, target INR between 2 and 3.\par 3.  She was advised regarding weight loss.\par 4.  The natural history of aortic stenosis was discussed with her.\par 5.  Next visit with me in the office in 4 months.\par 6.  The above was reviewed with the patient, all questions answered.\par

## 2023-02-21 NOTE — REASON FOR VISIT
[FreeTextEntry1] : Cardiology follow-up visit for evaluation management of CAD, status post coronary intervention, anticoagulated for factor V Leiden, aortic stenosis and hypertension.\par \par

## 2023-02-21 NOTE — PHYSICAL EXAM
[Normal S1, S2] : normal S1, S2 [No Rub] : no rub [No Gallop] : no gallop [Murmur] : murmur [Normal] : alert and oriented, normal memory [de-identified] : ll/Vl systolic

## 2023-02-21 NOTE — HISTORY OF PRESENT ILLNESS
[FreeTextEntry1] : She has returned from [place], will be staying in [place] for the next couple of weeks.\par She has been having ongoing pain in her back and lower legs secondary to sciatica.\par Activity levels are limited.\par No complaints of chest pain or chest pressure.  No shortness of breath.  Denies palpitations.  No dizziness.  No syncope.  No edema, no orthopnea.  No PND.\par Remains on warfarin.  Denies any excessive ecchymosis, bleeding, black or bloody stools, hematuria or epistaxis.\par \par

## 2023-03-06 LAB — INR PPP: 2.1

## 2023-03-29 ENCOUNTER — RX RENEWAL (OUTPATIENT)
Age: 78
End: 2023-03-29

## 2023-03-31 LAB — INR PPP: 3.2

## 2023-04-05 ENCOUNTER — EVALUATION (OUTPATIENT)
Dept: PHYSICAL THERAPY | Age: 78
End: 2023-04-05

## 2023-04-05 DIAGNOSIS — M54.16 LUMBAR RADICULOPATHY: Primary | ICD-10-CM
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
## 2023-04-11 ENCOUNTER — APPOINTMENT (OUTPATIENT)
Dept: ORTHOPEDIC SURGERY | Facility: CLINIC | Age: 78
End: 2023-04-11

## 2023-04-13 ENCOUNTER — APPOINTMENT (OUTPATIENT)
Dept: ORTHOPEDIC SURGERY | Facility: CLINIC | Age: 78
End: 2023-04-13
Payer: COMMERCIAL

## 2023-04-13 DIAGNOSIS — G56.01 CARPAL TUNNEL SYNDROME, RIGHT UPPER LIMB: ICD-10-CM

## 2023-04-13 PROCEDURE — 20605 DRAIN/INJ JOINT/BURSA W/O US: CPT

## 2023-04-13 PROCEDURE — 99213 OFFICE O/P EST LOW 20 MIN: CPT | Mod: 25

## 2023-04-13 NOTE — PHYSICAL EXAM
[de-identified] : Patient is WDWN, alert, and in no acute distress. Breathing is unlabored. She is grossly oriented to person, place, and time.\par \par Right Hand:\par No instability of the UCL to the MP joint of the right thumb upon stress testing. \par No residual swelling to the thumb\par There is a full arc of motion to the right thumb.\par Mild tenderness noted over the A1 pulley.\par Basal joint tenderness, with a positive grind test. \par Mild tenderness over the A1 pulley of the right index finger.\par Phalen's Test: Positive\par Tinel's Test: Positive\par No numbness and tingling at rest. [de-identified] : EXAM:  MR THUMB RT\par PROCEDURE DATE:  04/05/2022\par IMPRESSION:\par 1.  Low-grade partial-thickness tear of the dorsal attachment of the ulnar collateral ligament of the first MCP joint, as described above.\par 2.  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 Apr 10 2022  2:57PM

## 2023-04-13 NOTE — END OF VISIT
[FreeTextEntry3] : All medical record entries made by the Scribe were at my,  Dr. Miguel Mercado MD., direction and personally dictated by me on 04/13/2023. I have personally reviewed the chart and agree that the record accurately reflects my personal performance of the history, physical exam, assessment and plan.
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
## 2023-04-13 NOTE — DISCUSSION/SUMMARY
[FreeTextEntry1] : The underlying pathophysiology was reviewed with the patient. XR films were reviewed with the patient. Discussed at length the nature of the patient’s condition. The right thumb symptoms are secondary to CMC joint arthritis. The right hand symptoms are secondary to possible mild carpal tunnel syndrome.\par \par With regard to the right thumb, I discussed with her that her residual symptoms are seemingly emanating from her CMC joint secondary to arthritis. I therefore recommended she soak the hand in warm water and Epsom salts for relief. She was additionally given a repeat cortisone injection to the CMC joint of the right thumb, at her request.\par The patient wishes to proceed with a cortisone injection at this time. Under sterile precautions, an injection of 0.5 cc 1% lidocaine with 0.25 cc of Kenalog and 0.25 cc of Dexamethasone was administered into the RIGHT basal joint (3). The patient tolerated the procedure well. Rest and apply ice. \par \par With regard to the right hand, I did tell her that she may have evidence of mild carpal tunnel syndrome based upon her exam. I explained to her that carpal tunnel syndrome does not always present as numbness and at times can present as pain. With that being said, I would recommend an EMG for further evaluation, given that her symptoms do not present classically. \par An EMG/NCV test of the upper extremities was ordered to evaluate for right carpal tunnel syndrome. Patient will follow-up to review the results and discuss further treatment recommendations.\par \par All questions answered, understanding verbalized. Patient in agreement with plan of care. Follow up after EMGs.

## 2023-04-13 NOTE — ADDENDUM
[FreeTextEntry1] : I, Isamar Fox wrote this note acting as a scribe for Dr. Miguel Mercado on Apr 13, 2023.

## 2023-04-14 ENCOUNTER — RX RENEWAL (OUTPATIENT)
Age: 78
End: 2023-04-14

## 2023-04-26 ENCOUNTER — OFFICE VISIT (OUTPATIENT)
Dept: PHYSICAL THERAPY | Age: 78
End: 2023-04-26

## 2023-04-26 DIAGNOSIS — M54.16 LUMBAR RADICULOPATHY: Primary | ICD-10-CM

## 2023-04-26 NOTE — PROGRESS NOTES
Daily Note     Today's date: 2023  Patient name: Aleida Barton  : 1945  MRN: [id number]  Referring provider: Eli Wilkinson  Dx:   Encounter Diagnosis     ICD-10-CM    1  Lumbar radiculopathy  M54 16           Start Time: 1100  Stop Time: 1200  Total time in clinic (min): 60 minutes    Subjective: pt notes she is trying to work on her HEP but it's a lot harder out of the water and more painful to do ex's  Objective: See treatment diary below      Assessment: Tolerated treatment well  Challenged pt today w/ DB's during water walking, added resistive bands for posture and core  D/c bench ex's next visit and work on core and balance w/ paddles  Slow gains w/ core strength w/ ex's  Patient would benefit from continued PT      Plan: Continue per plan of care        Precautions:     Daily Treatment Diary     Manual                                                                                   Exercise Diary           Water walking 5 10 10 10         Postural training             Gait training             Hip flex/ext swing  2 2 2         Wall: t/h raises 1 1 1 1         Hip abd/add 2 2 2 2         Marching 1 1 1 1         squats 1 1 1 1         Knee flex/ext  1 1 1         Step-ups (fwd/bkwd/ss)             SLS (eyes open/closed)             SLS w UE mvmt  AROM/ball toss             Weight shifting             UE Noodle work x 4   4' 4 4         UE AROM             Resistive UE work (paddles, bells, TB)    2 Yellow         Core work on noodle (sitting/stdg)             Sit on noodle with movement             Seated on pool bench w proper posture             Ankle df/pf  1' 1  D/c        marching  1' 1 1 D/c        Hip Ab/add    1 D/c        Knee flex/ext  2' 2 1 D/c        Deep water mvmt 5 5 5 6' bike 1' hips         Deep water tx/stretching 5 10 10 10         Specific self - stretches wall/steps 5 5 5 3             Modalities           whirlpool 5 10 10  10

## 2023-04-28 ENCOUNTER — OFFICE VISIT (OUTPATIENT)
Dept: PHYSICAL THERAPY | Age: 78
End: 2023-04-28

## 2023-04-28 DIAGNOSIS — M54.16 LUMBAR RADICULOPATHY: Primary | ICD-10-CM

## 2023-04-28 NOTE — PROGRESS NOTES
"Daily Note     Today's date: 2023  Patient name: Orlando Stewart  : 1945  MRN: [id number]  Referring provider: Sahil Donnelly  Dx:   Encounter Diagnosis     ICD-10-CM    1  Lumbar radiculopathy  M54 16           Start Time: 06  Stop Time: 1200  Total time in clinic (min): 55 minutes    Subjective: pt notes she felt good after last session  \" I know I need this  \"      Objective: See treatment diary below  Pt seen 1:1 for 30 minutes today and group therapy for remainder    Assessment: Tolerated treatment well  Patient would benefit from continued PT      Plan: Continue per plan of care        Precautions:     Daily Treatment Diary       Exercise Diary          Water walking 5 10 10 10 10        Postural training             Gait training             Hip flex/ext swing  2 2 2 2        Wall: t/h raises 1 1 1 1 1        Hip abd/add 2 2 2 2 2        Marching 1 1 1 1 1        squats 1 1 1 1 1        Knee flex/ext  1 1 1 1        Step-ups (fwd/bkwd/ss)             Balance work     5'        SLS w UE mvmt  AROM/ball toss             Weight shifting             UE Noodle work x 4   4' 4 4 4        UE AROM             Resistive UE work (paddles, bells, TB)    2 Yellow 2        Core work on noodle (sitting/stdg)             Sit on noodle with movement             Seated on pool bench w proper posture             Ankle df/pf  1' 1  D/c        marching  1' 1 1 D/c        Hip Ab/add    1 D/c        Knee flex/ext  2' 2 1 D/c        Deep water mvmt 5 5 5 6' bike 1' hips 6,1        Deep water tx/stretching 5 10 10 10 10        Specific self - stretches wall/steps 5 5 5 3 3            Modalities          whirlpool 5 10 10  10 10                     "

## 2023-05-01 LAB — INR PPP: 2.1

## 2023-05-03 ENCOUNTER — OFFICE VISIT (OUTPATIENT)
Dept: PHYSICAL THERAPY | Age: 78
End: 2023-05-03

## 2023-05-03 DIAGNOSIS — M54.16 LUMBAR RADICULOPATHY: Primary | ICD-10-CM

## 2023-05-03 NOTE — PROGRESS NOTES
Daily Note     Today's date: 5/3/2023  Patient name: Ingrid Dunn  : 1945  MRN: [id number]  Referring provider: Yesenia Tinajero  Dx:   Encounter Diagnosis     ICD-10-CM    1  Lumbar radiculopathy  M54 16           Start Time: 1100  Stop Time: 1200  Total time in clinic (min): 60 minutes    Subjective: Patient reports she drops objects like juice containers at home due to no  strength and stiffness  Objective: See treatment diary below      Assessment: Tolerated treatment well, challenged with balance mark with tandem walking and SLS  Patient exhibited good technique with therapeutic exercises and would benefit from continued PT      Plan: Continue per plan of care        Precautions:     Daily Treatment Diary       Exercise Diary  4/5 4/19 4/21 4/26 4/28 5/3       Water walking 5 10 10 10 10 10       Postural training             Gait training             Hip flex/ext swing  2 2 2 2 2       Wall: t/h raises 1 1 1 1 1 1       Hip abd/add 2 2 2 2 2 2       Marching 1 1 1 1 1 1       squats 1 1 1 1 1 1       Knee flex/ext  1 1 1 1 1       Step-ups (fwd/bkwd/ss)      1       Balance work     5' 5'       SLS w UE mvmt  AROM/ball toss             Weight shifting             UE Noodle work x 4   4' 4 4 4 4       UE AROM             Resistive UE work (paddles, bells, TB)    2 Yellow 2 2       Core work on noodle (sitting/stdg)             Sit on noodle with movement             Seated on pool bench w proper posture             Ankle df/pf  1' 1  D/c        marching  1' 1 1 D/c        Hip Ab/add    1 D/c        Knee flex/ext  2' 2 1 D/c        Deep water mvmt 5 5 5 6' bike 1' hips 6,1 6' 1'       Deep water tx/stretching 5 10 10 10 10 10       Specific self - stretches wall/steps 5 5 5 3 3 3           Modalities  3       whirlpool 5 10 10  10 10 10

## 2023-05-05 ENCOUNTER — OFFICE VISIT (OUTPATIENT)
Dept: PHYSICAL THERAPY | Age: 78
End: 2023-05-05

## 2023-05-05 DIAGNOSIS — M54.16 LUMBAR RADICULOPATHY: Primary | ICD-10-CM

## 2023-05-05 NOTE — PROGRESS NOTES
Daily Note     Today's date: 2023  Patient name: César Cuba  : 1945  MRN: [id number]  Referring provider: Alan Ramírez  Dx:   Encounter Diagnosis     ICD-10-CM    1  Lumbar radiculopathy  M54 16           Start Time: 1300  Stop Time: 1400  Total time in clinic (min): 60 minutes    Subjective: pt notes no new complaints  Objective: See treatment diary below      Assessment: Tolerated treatment well  Patient would benefit from continued PT      Plan: Continue per plan of care        Precautions:     Daily Treatment Diary       Exercise Diary  4/5 4/19 4/21 4/26 4/28 5/3 5/5      Water walking 5 10 10 10 10 10 10      Postural training             Gait training             Hip flex/ext swing  2 2 2 2 2 2      Wall: t/h raises 1 1 1 1 1 1 1      Hip abd/add 2 2 2 2 2 2 2      Marching 1 1 1 1 1 1 1      squats 1 1 1 1 1 1 1      Knee flex/ext  1 1 1 1 1 1      Step-ups (fwd/bkwd/ss)      1 nt      Balance work     5' 5' 5      SLS w UE mvmt  AROM/ball toss             Weight shifting             UE Noodle work x 4   4' 4 4 4 4 4      UE AROM             Resistive UE work (paddles, bells, TB)    2 Yellow 2 2 2 Y       Core work on noodle (sitting/stdg)             Sit on noodle with movement             Seated on pool bench w proper posture             Ankle df/pf  1' 1  D/c        marching  1' 1 1 D/c        Hip Ab/add    1 D/c        Knee flex/ext  2' 2 1 D/c        Deep water mvmt 5 5 5 6' bike 1' hips 6,1 6' 1' 6, 1      Deep water tx/stretching 5 10 10 10 10 10 10      Specific self - stretches wall/steps 5 5 5 3 3 3 3          Modalities  4/5 4/19 4/21 4/26 4/28 5/3 5      whirlpool 5 10 10  10 10 10 10

## 2023-05-10 ENCOUNTER — OFFICE VISIT (OUTPATIENT)
Dept: PHYSICAL THERAPY | Age: 78
End: 2023-05-10

## 2023-05-10 DIAGNOSIS — M54.16 LUMBAR RADICULOPATHY: Primary | ICD-10-CM

## 2023-05-10 NOTE — PROGRESS NOTES
Daily Note     Today's date: 5/10/2023  Patient name: Brad Pereyra  : 1945  MRN: [id number]  Referring provider: Damaris Harper  Dx:   Encounter Diagnosis     ICD-10-CM    1  Lumbar radiculopathy  M54 16                      Subjective: Patient offers no new complaints today      Objective: See treatment diary below      Assessment: Tolerated treatment well, increased resistance with theraband today with good tolerance  Some challenges with her  with some of the pool equipment but improving overall  Patient exhibited good technique with therapeutic exercises and would benefit from continued PT      Plan: Continue per plan of care        Precautions:     Daily Treatment Diary       Exercise Diary  4/5 4/19 4/21 4/26 4/28 5/3 5 5/10     Water walking 5 10 10 10 10 10 10 10     Postural training             Gait training             Hip flex/ext swing  2 2 2 2 2 2 2     Wall: t/h raises 1 1 1 1 1 1 1 1     Hip abd/add 2 2 2 2 2 2 2 2     Marching 1 1 1 1 1 1 1 1     squats 1 1 1 1 1 1 1 1     Knee flex/ext  1 1 1 1 1 1 1     Step-ups (fwd/bkwd/ss)      1 nt      Balance work     5' 5' 5 5     SLS w UE mvmt  AROM/ball toss             Weight shifting             UE Noodle work x 4   4' 4 4 4 4 4 4     UE AROM             Resistive UE work (paddles, bells, TB)    2 Yellow 2 2 2 Y  2     Core work on noodle (sitting/stdg)             Sit on noodle with movement             Seated on pool bench w proper posture             Ankle df/pf  1' 1  D/c        marching  1' 1 1 D/c        Hip Ab/add    1 D/c        Knee flex/ext  2' 2 1 D/c        Deep water mvmt 5 5 5 6' bike 1' hips 6,1 6' 1' 6, 1 6' 2' 2'     Deep water tx/stretching 5 10 10 10 10 10 10 10     Specific self - stretches wall/steps 5 5 5 3 3 3 3 3         Modalities  3 55 5/8     whirlpool 5 10 10  10 10 10 10 10

## 2023-05-12 ENCOUNTER — OFFICE VISIT (OUTPATIENT)
Dept: PHYSICAL THERAPY | Age: 78
End: 2023-05-12

## 2023-05-12 DIAGNOSIS — M54.16 LUMBAR RADICULOPATHY: Primary | ICD-10-CM

## 2023-05-12 NOTE — PROGRESS NOTES
Daily Note     Today's date: 2023  Patient name: Megan Morse  : 1945  MRN: [id number]  Referring provider: Krishna Ramsey  Dx:   Encounter Diagnosis     ICD-10-CM    1  Lumbar radiculopathy  M54 16           Start Time:   Stop Time: 357  Total time in clinic (min): 60 minutes    Subjective: Patient has no new complaints  She states she feels better in the water and unable to do land PT  Objective: See treatment diary below      Assessment: Tolerated treatment well  Patient would benefit from continued PT  Good movements in the water  Motivated  Plan: Continue per plan of care        Precautions:     Daily Treatment Diary       Exercise Diary  4/5 4/19 4/21 4/26 4/28 5/3 5/5 5/10 5/12    Water walking 5 10 10 10 10 10 10 10 10' w/dumbells    Postural training             Gait training             Hip flex/ext swing  2 2 2 2 2 2 2 2'    Wall: t/h raises 1 1 1 1 1 1 1 1 1    Hip abd/add 2 2 2 2 2 2 2 2 2    Marching 1 1 1 1 1 1 1 1 1    squats 1 1 1 1 1 1 1 1 1    Knee flex/ext  1 1 1 1 1 1 1 1    Step-ups (fwd/bkwd/ss)      1 nt      Balance work     5' 5' 5 5 5'    SLS w UE mvmt  AROM/ball toss             Weight shifting             UE Noodle work x 4   4' 4 4 4 4 4 4 3    UE AROM             Resistive UE work (paddles, bells, TB)    2 Yellow 2 2 2 Y  2 2    Core work on noodle (sitting/stdg)             Sit on noodle with movement             Seated on pool bench w proper posture             Ankle df/pf  1' 1  D/c        marching  1' 1 1 D/c        Hip Ab/add    1 D/c        Knee flex/ext  2' 2 1 D/c        Deep water mvmt 5 5 5 6' bike 1' hips 6,1 6' 1' 6, 1 6' 2' 2' 6,2,2    Deep water tx/stretching 5 10 10 10 10 10 10 10 10    Specific self - stretches wall/steps 5 5 5 3 3 3 3 3 2        Modalities   53 55 5    whirlpool 5 10 10  10 10 10 10 10 10

## 2023-05-16 ENCOUNTER — EVALUATION (OUTPATIENT)
Dept: PHYSICAL THERAPY | Age: 78
End: 2023-05-16

## 2023-05-16 DIAGNOSIS — M54.16 LUMBAR RADICULOPATHY: Primary | ICD-10-CM

## 2023-05-16 NOTE — LETTER
May 16, 2023    [address] [place] [address]    Patient: Lea Kerr   YOB: 1945   Date of Visit: 2023     Encounter Diagnosis     ICD-10-CM    1  Lumbar radiculopathy  M54 16           Dear Dr Royal Rojas:    Thank you for your recent referral of Lea Kerr  Please review the attached evaluation summary from Phyllis's recent visit  Please verify that you agree with the plan of care by signing the attached order  If you have any questions or concerns, please do not hesitate to call  I sincerely appreciate the opportunity to share in the care of one of your patients and hope to have another opportunity to work with you in the near future  Sincerely,    Vineet Weems, PT      Referring Provider:      I certify that I have read the below Plan of Care and certify the need for these services furnished under this plan of treatment while under my care  Nory Li [address]  Via Fax: [phone]          PT Re-Evaluation     Today's date: 2023  Patient name: Lea Kerr  : 1945  MRN: [id number]  Referring provider: Zofia Gates  Dx:   Encounter Diagnosis     ICD-10-CM    1  Lumbar radiculopathy  M54 16           Start Time: 1100  Stop Time: 1130  Total time in clinic (min): 30 minutes    Assessment  Assessment details: 5/15/2023, patient demonstrates increased ROM and strength and sit and walk and ambulate on steps better   Patient demonstrates improved FOTO scores and still has poor balance  Impairments: abnormal gait, abnormal muscle firing, abnormal muscle tone, abnormal or restricted ROM, abnormal movement, activity intolerance, impaired balance, impaired physical strength, lacks appropriate home exercise program, pain with function, safety issue, weight-bearing intolerance, poor posture  and poor body mechanics  Understanding of Dx/Px/POC: good Prognosis: good    Goals  ST-3 WEEKS  1  Decrease pain by 2 points on VAS at its worst MET  2  Increase ROM by > 5 deg in all deficients planes  MET  3  Increase CORE/LE by 1/2 MMT grade in all deficient planes  WORKING TOWARDS    LT-6 WEEKS  1  Patient to be independent with a/iadls especially sitting and walking tolerance  WORKING TOWARDS  2  Increase functional activities for leisure and home activities to previous LOF    3  Independent with HEP and/or fitness program     Plan  Patient would benefit from: skilled physical therapy  Planned modality interventions: cryotherapy, electrical stimulation/Russian stimulation, thermotherapy: hydrocollator packs and unattended electrical stimulation  Planned therapy interventions: activity modification, behavior modification, body mechanics training, aquatic therapy, flexibility, functional ROM exercises, home exercise program, IADL retraining, joint mobilization, manual therapy, neuromuscular re-education, patient education, postural training, strengthening, stretching, therapeutic activities and therapeutic exercise  Frequency: 2x week (2-3x week)  Duration in weeks: 6  Plan of Care beginning date: 2023  Plan of Care expiration date: 2023  Treatment plan discussed with: patient        Subjective Evaluation    History of Present Illness  Date of onset: 2023  Mechanism of injury: 5/15/2023, patient reports decreased pain and increased ROM and strength after 10 aquatic therapy visits          Not a recurrent problem   Quality of life: good    Pain  Current pain ratin  At best pain rating: 3  At worst pain ratin  Quality: radiating, throbbing and burning  Relieving factors: medications  Aggravating factors: sitting  Progression: improved    Social Support  Steps to enter house: yes  Stairs in house: yes   Lives in: multiple-level home  Lives with: adult children      Diagnostic Tests  X-ray: abnormal  MRI studies: abnormal  Treatments  Previous treatment: chiropractic, medication, injection treatment and physical therapy  Patient Goals  Patient goals for therapy: decreased pain, increased motion, increased strength and independence with ADLs/IADLs          Objective     Static Posture     Thoracic Spine  Hyperkyphosis  Knee   Genu varus  Palpation   Left   Hypertonic in the erector spinae and lumbar paraspinals  Right   Hypertonic in the erector spinae and lumbar paraspinals  Tenderness     Lumbar Spine  Tenderness in the spinous process and facet joint  Active Range of Motion   Cervical/Thoracic Spine       Thoracic    Extension:  Restriction level: moderate    Lumbar   Flexion: 55 degrees   Extension: 15 degrees  with pain  Left lateral flexion: 20 degrees       Right lateral flexion: 25 degrees     Strength/Myotome Testing     Left Hip   Planes of Motion   Flexion: 4  Extension: 4-  Abduction: 4-  Adduction: 4    Right Hip   Planes of Motion   Flexion: 4  Extension: 4-  Abduction: 4-  Adduction: 4    Left Knee   Flexion: 4  Extension: 4-    Right Knee   Flexion: 4  Extension: 4-    Left Ankle/Foot   Dorsiflexion: 4-  Plantar flexion: 4    Right Ankle/Foot   Dorsiflexion: 4-  Plantar flexion: 4    Additional Strength Details  CORE is 3/5    Tests     Lumbar     Left   Negative passive SLR and slump test      Right   Negative passive SLR and slump test      Ambulation     Observational Gait   Gait: antalgic and crouched   Decreased walking speed and stride length       Functional Assessment        Single Leg Stance   Left: 2 seconds  Right: 3 seconds      Flowsheet Rows    Flowsheet Row Most Recent Value   PT/OT G-Codes    Current Score 56   Projected Score 49             Precautions:     Daily Treatment Diary       Exercise Diary  4/5 4/19 4/21 4/26 4/28 5/3 5/5 5/10 5/12 5/16   Water walking 5 10 10 10 10 10 10 10 10' w/dumbells 10   Postural training             Gait training             Hip flex/ext swing  2 2 2 2 2 2 2 2' 2   Wall: t/h raises 1 1 1 1 1 1 1 1 1 1   Hip abd/add 2 2 2 2 2 2 2 2 2 2   Marching 1 1 1 1 1 1 1 1 1 1   squats 1 1 1 1 1 1 1 1 1 1   Knee flex/ext  1 1 1 1 1 1 1 1 1   Step-ups (fwd/bkwd/ss)      1 nt      Balance work     5' 5' 5 5 5' 5   SLS w UE mvmt  AROM/ball toss             Weight shifting             UE Noodle work x 4   4' 4 4 4 4 4 4 3 3   UE AROM             Resistive UE work (paddles, bells, TB)    2 Yellow 2 2 2 Y  2 2 2   Core work on noodle (sitting/stdg)             Sit on noodle with movement             Seated on pool bench w proper posture             Ankle df/pf  1' 1  D/c        marching  1' 1 1 D/c        Hip Ab/add    1 D/c        Knee flex/ext  2' 2 1 D/c        Deep water mvmt 5 5 5 6' bike 1' hips 6,1 6' 1' 6, 1 6' 2' 2' 6,2,2 10   Deep water tx/stretching 5 10 10 10 10 10 10 10 10 10   Specific self - stretches wall/steps 5 5 5 3 3 3 3 3 2 2       Modalities  4/5 4/19 4/21 4/26 4/28 5/3 5/5 5/8 5/12 5/16   whirlpool 5 10 10  10 10 10 10 10 10

## 2023-05-16 NOTE — PROGRESS NOTES
PT Re-Evaluation     Today's date: 2023  Patient name: Williams Rivas  : 1945  MRN: [id number]  Referring provider: Case Hawkins  Dx:   Encounter Diagnosis     ICD-10-CM    1  Lumbar radiculopathy  M54 16           Start Time: 1100  Stop Time: 1130  Total time in clinic (min): 30 minutes    Assessment  Assessment details: 5/15/2023, patient demonstrates increased ROM and strength and sit and walk and ambulate on steps better  Patient demonstrates improved FOTO scores and still has poor balance  Impairments: abnormal gait, abnormal muscle firing, abnormal muscle tone, abnormal or restricted ROM, abnormal movement, activity intolerance, impaired balance, impaired physical strength, lacks appropriate home exercise program, pain with function, safety issue, weight-bearing intolerance, poor posture  and poor body mechanics  Understanding of Dx/Px/POC: good   Prognosis: good    Goals  ST-3 WEEKS  1  Decrease pain by 2 points on VAS at its worst MET  2  Increase ROM by > 5 deg in all deficients planes  MET  3  Increase CORE/LE by 1/2 MMT grade in all deficient planes  WORKING TOWARDS    LT-6 WEEKS  1  Patient to be independent with a/iadls especially sitting and walking tolerance  WORKING TOWARDS  2  Increase functional activities for leisure and home activities to previous LOF    3  Independent with HEP and/or fitness program     Plan  Patient would benefit from: skilled physical therapy  Planned modality interventions: cryotherapy, electrical stimulation/Russian stimulation, thermotherapy: hydrocollator packs and unattended electrical stimulation  Planned therapy interventions: activity modification, behavior modification, body mechanics training, aquatic therapy, flexibility, functional ROM exercises, home exercise program, IADL retraining, joint mobilization, manual therapy, neuromuscular re-education, patient education, postural training, strengthening, stretching, therapeutic activities and therapeutic exercise  Frequency: 2x week (2-3x week)  Duration in weeks: 6  Plan of Care beginning date: 2023  Plan of Care expiration date: 2023  Treatment plan discussed with: patient        Subjective Evaluation    History of Present Illness  Date of onset: 2023  Mechanism of injury: 5/15/2023, patient reports decreased pain and increased ROM and strength after 10 aquatic therapy visits          Not a recurrent problem   Quality of life: good    Pain  Current pain ratin  At best pain rating: 3  At worst pain ratin  Quality: radiating, throbbing and burning  Relieving factors: medications  Aggravating factors: sitting  Progression: improved    Social Support  Steps to enter house: yes  Stairs in house: yes   Lives in: multiple-level home  Lives with: adult children      Diagnostic Tests  X-ray: abnormal  MRI studies: abnormal  Treatments  Previous treatment: chiropractic, medication, injection treatment and physical therapy  Patient Goals  Patient goals for therapy: decreased pain, increased motion, increased strength and independence with ADLs/IADLs          Objective     Static Posture     Thoracic Spine  Hyperkyphosis  Knee   Genu varus  Palpation   Left   Hypertonic in the erector spinae and lumbar paraspinals  Right   Hypertonic in the erector spinae and lumbar paraspinals  Tenderness     Lumbar Spine  Tenderness in the spinous process and facet joint       Active Range of Motion   Cervical/Thoracic Spine       Thoracic    Extension:  Restriction level: moderate    Lumbar   Flexion: 55 degrees   Extension: 15 degrees  with pain  Left lateral flexion: 20 degrees       Right lateral flexion: 25 degrees     Strength/Myotome Testing     Left Hip   Planes of Motion   Flexion: 4  Extension: 4-  Abduction: 4-  Adduction: 4    Right Hip   Planes of Motion   Flexion: 4  Extension: 4-  Abduction: 4-  Adduction: 4    Left Knee   Flexion: 4  Extension: 4-    Right Knee   Flexion: 4  Extension: 4-    Left Ankle/Foot   Dorsiflexion: 4-  Plantar flexion: 4    Right Ankle/Foot   Dorsiflexion: 4-  Plantar flexion: 4    Additional Strength Details  CORE is 3/5    Tests     Lumbar     Left   Negative passive SLR and slump test      Right   Negative passive SLR and slump test      Ambulation     Observational Gait   Gait: antalgic and crouched   Decreased walking speed and stride length       Functional Assessment        Single Leg Stance   Left: 2 seconds  Right: 3 seconds      Flowsheet Rows    Flowsheet Row Most Recent Value   PT/OT G-Codes    Current Score 56   Projected Score 49              Precautions:     Daily Treatment Diary       Exercise Diary  4/5 4/19 4/21 4/26 4/28 5/3 5/5 5/10 5/12 5/16   Water walking 5 10 10 10 10 10 10 10 10' w/dumbells 10   Postural training             Gait training             Hip flex/ext swing  2 2 2 2 2 2 2 2' 2   Wall: t/h raises 1 1 1 1 1 1 1 1 1 1   Hip abd/add 2 2 2 2 2 2 2 2 2 2   Marching 1 1 1 1 1 1 1 1 1 1   squats 1 1 1 1 1 1 1 1 1 1   Knee flex/ext  1 1 1 1 1 1 1 1 1   Step-ups (fwd/bkwd/ss)      1 nt      Balance work     5' 5' 5 5 5' 5   SLS w UE mvmt  AROM/ball toss             Weight shifting             UE Noodle work x 4   4' 4 4 4 4 4 4 3 3   UE AROM             Resistive UE work (paddles, bells, TB)    2 Yellow 2 2 2 Y  2 2 2   Core work on noodle (sitting/stdg)             Sit on noodle with movement             Seated on pool bench w proper posture             Ankle df/pf  1' 1  D/c        marching  1' 1 1 D/c        Hip Ab/add    1 D/c        Knee flex/ext  2' 2 1 D/c        Deep water mvmt 5 5 5 6' bike 1' hips 6,1 6' 1' 6, 1 6' 2' 2' 6,2,2 10   Deep water tx/stretching 5 10 10 10 10 10 10 10 10 10   Specific self - stretches wall/steps 5 5 5 3 3 3 3 3 2 2       Modalities  4/5 4/19 4/21 4/26 4/28 5/3 5/5 5/8 5/12 5/16   whirlpool 5 10 10  10 10 10 10 10 10

## 2023-05-17 LAB — INR PPP: 2.2

## 2023-05-18 ENCOUNTER — APPOINTMENT (OUTPATIENT)
Dept: FAMILY MEDICINE | Facility: CLINIC | Age: 78
End: 2023-05-18

## 2023-05-19 ENCOUNTER — OFFICE VISIT (OUTPATIENT)
Dept: PHYSICAL THERAPY | Age: 78
End: 2023-05-19

## 2023-05-19 DIAGNOSIS — M54.16 LUMBAR RADICULOPATHY: Primary | ICD-10-CM

## 2023-05-19 NOTE — PROGRESS NOTES
Daily Note     Today's date: 2023  Patient name: [place] Benitez  : 1945  MRN: [id number]  Referring provider: Noel Chavez  Dx:   Encounter Diagnosis     ICD-10-CM    1  Lumbar radiculopathy  M54 16                      Subjective: Reports feeling good today, offers no new complaints       Objective: See treatment diary below      Assessment: Tolerated treatment well  Cues for carryover of program and to ensure she maintains proper form  Patient demonstrates good movement in the water  Monitoring of time to ensure proper dosage of prescribed exercises are performed  Patient notes improved balance when ambulating when performing tandem amb and SLS in water  Increased time for SLS today  Relief felt in water  Patient would benefit from continued PT      Plan: Continue per plan of care        Precautions:     Daily Treatment Diary       Exercise Diary  5/19 4/19 4/21 4/26 4/28 5/3 5/5 5/10 5/12 5/16   Water walking 10' 10 10 10 10 10 10 10 10' w/dumbells 10   Postural training             Gait training             Hip flex/ext swing 2' 2 2 2 2 2 2 2 2' 2   Wall: t/h raises 1 1 1 1 1 1 1 1 1 1   Hip abd/add 2 2 2 2 2 2 2 2 2 2   Marching 1 1 1 1 1 1 1 1 1 1   squats 1 1 1 1 1 1 1 1 1 1   Knee flex/ext  1 1 1 1 1 1 1 1 1   Step-ups (fwd/bkwd/ss)      1 nt      Balance work 5'    5' 5' 5 5 5' 5   SLS w UE mvmt  AROM/ball toss             Weight shifting             UE Noodle work x 4  4' 4' 4 4 4 4 4 4 3 3   UE AROM             Resistive UE work (paddles, bells, TB) 2 yellow   2 Yellow 2 2 2 Y  2 2 2   Core work on noodle (sitting/stdg)             Sit on noodle with movement             Seated on pool bench w proper posture             Ankle df/pf  1' 1  D/c        marching  1' 1 1 D/c        Hip Ab/add    1 D/c        Knee flex/ext  2' 2 1 D/c        Deep water mvmt 5 5 5 6' bike 1' hips 6,1 6' 1' 6, 1 6' 2' 2' 6,2,2 10   Deep water tx/stretching 5 10 10 10 10 10 10 10 10 10   Specific self - stretches wall/steps 5 5 5 3 3 3 3 3 2 2       Modalities  5/19 4/19 4/21 4/26 4/28 5/3 5/5 5/8 5/12 5/16   whirlpool 10 10 10  10 10 10 10 10 10

## 2023-05-23 ENCOUNTER — APPOINTMENT (OUTPATIENT)
Dept: CARDIOLOGY | Facility: CLINIC | Age: 78
End: 2023-05-23

## 2023-05-24 ENCOUNTER — OFFICE VISIT (OUTPATIENT)
Dept: PHYSICAL THERAPY | Age: 78
End: 2023-05-24

## 2023-05-24 DIAGNOSIS — M54.16 LUMBAR RADICULOPATHY: Primary | ICD-10-CM

## 2023-05-24 NOTE — PROGRESS NOTES
Daily Note     Today's date: 2023  Patient name: Verna Knott  : 1945  MRN: [id number]  Referring provider: Raquel Real  Dx:   Encounter Diagnosis     ICD-10-CM    1  Lumbar radiculopathy  M54 16           Start Time: 1300  Stop Time: 1400  Total time in clinic (min): 60 minutes    Subjective: Patient offers no new complaints  Objective: See treatment diary below      Assessment: Tolerated treatment fairly well, no c/o pain today, cues for ex technique and posture  Patient demonstrates good movement in the water  Monitoring of time to ensure proper dosage of prescribed exercises are performed  Patient exhibited good technique with therapeutic exercises and would benefit from continued PT      Plan: Continue per plan of care        Precautions:     Daily Treatment Diary       Exercise Diary  5/19 5/24  4/26 4/28 5/3 5/5 5/10 5/12 5/16   Water walking 10' 10 10 10 10 10 10 10 10' w/dumbells 10   Postural training             Gait training             Hip flex/ext swing 2' 2 2 2 2 2 2 2 2' 2   Wall: t/h raises 1 1 1 1 1 1 1 1 1 1   Hip abd/add 2 2 2 2 2 2 2 2 2 2   Marching 1 1 1 1 1 1 1 1 1 1   squats 1 1 1 1 1 1 1 1 1 1   Knee flex/ext  1 1 1 1 1 1 1 1 1   Step-ups (fwd/bkwd/ss)      1 nt      Balance work 5' 5'   5' 5' 5 5 5' 5   SLS w UE mvmt  AROM/ball toss             Weight shifting             UE Noodle work x 4  4' 4' 4 4 4 4 4 4 3 3   UE AROM             Resistive UE work (paddles, bells, TB) 2 yellow 2'  2 Yellow 2 2 2 Y  2 2 2   Core work on noodle (sitting/stdg)             Sit on noodle with movement             Seated on pool bench w proper posture             Ankle df/pf   1  D/c        marching   1 1 D/c        Hip Ab/add    1 D/c        Knee flex/ext    2 1 D/c        Deep water mvmt 5 6' 2' 2' 5 6' bike 1' hips 6,1 6' 1' 6, 1 6' 2' 2' 6,2,2 10   Deep water tx/stretching 5 10 10 10 10 10 10 10 10 10   Specific self - stretches wall/steps 5 5 5 3 3 3 3 3 2 2       Modalities 5/19 5/24  4/26 4/28 5/3 5/5 5/8 5/12 5/16   whirlpool 10 10 10  10 10 10 10 10 10

## 2023-05-26 ENCOUNTER — APPOINTMENT (OUTPATIENT)
Dept: PHYSICAL THERAPY | Age: 78
End: 2023-05-26
Payer: MEDICARE

## 2023-05-30 ENCOUNTER — OFFICE VISIT (OUTPATIENT)
Dept: PHYSICAL THERAPY | Age: 78
End: 2023-05-30

## 2023-05-30 DIAGNOSIS — M54.16 LUMBAR RADICULOPATHY: Primary | ICD-10-CM

## 2023-05-30 NOTE — PROGRESS NOTES
Daily Note     Today's date: 2023  Patient name: Colletta Caprio  : 1945  MRN: [id number]  Referring provider: Conor Thornton  Dx:   Encounter Diagnosis     ICD-10-CM    1  Lumbar radiculopathy  M54 16           Start Time: 1300  Stop Time: 1400  Total time in clinic (min): 60 minutes    Subjective: Reports stubbing her toes on a ledge going into her room and notes bruising  Patient states she has her MD visits coming up and will mention it to them  States the water made her toes feel a little better  Reports 5/10 pain at her LB  States she can do most ADLs but has to sit and take rest periods as the length of time she is walking, standing, or performing an activity is limited  States she can bring groceries inside but needs to modify and bring one at a time  Patient reports she is going away for 2 weeks and is considering joining the independent pool program when she returns  Objective: See treatment diary below      Assessment: Tolerated treatment well  Patient has good recall of her program, few cues needed  Monitored time to ensure proper dosage was completed  Challenged by tandem ambulation, relying on UE on noodle to stabilize at times  No c/o elevated pain t/o session  Patient demonstrates good movements and posture during TE  Patient will be discharged at this time to an independent [facility] and will call if she wishes to join the independent pool program        Plan: Patient will be discharged by her primary PT        Precautions:     Daily Treatment Diary       Exercise Diary   5/3 5/5 5/10 5/12 5/16   Water walking 10' 10 13' 10 10 10 10 10 10' w/dumbells 10   Postural training             Gait training             Hip flex/ext swing 2' 2 2' 2 2 2 2 2 2' 2   Wall: t/h raises 1 1  1 1 1 1 1 1 1   Hip abd/add 2 2 2' 2 2 2 2 2 2 2   Marching 1 1 1' 1 1 1 1 1 1 1   squats 1 1 1' 1 1 1 1 1 1 1   Knee flex/ext  1 1' 1 1 1 1 1 1 1   Step-ups (fwd/bkwd/ss)      1 nt Balance work 5' 5' 5'  5' 5' 5 5 5' 5   SLS w UE mvmt  AROM/ball toss             Weight shifting             UE Noodle work x 4  4' 4' 4' 4 4 4 4 4 3 3   UE AROM             Resistive UE work (paddles, bells, TB) 2 yellow 2' 2' 2 Yellow 2 2 2 Y  2 2 2   Core work on noodle (sitting/stdg)             Sit on noodle with movement             Seated on pool bench w proper posture             Ankle df/pf     D/c        marching    1 D/c        Hip Ab/add    1 D/c        Knee flex/ext     1 D/c        Deep water mvmt 5 6' 2' 2' 9' 6' bike 1' hips 6,1 6' 1' 6, 1 6' 2' 2' 6,2,2 10   Deep water tx/stretching 5 10 10' 10 10 10 10 10 10 10   Specific self - stretches wall/steps 5 5 5' 3 3 3 3 3 2 2       Modalities  5/19 5/24 5/30 4/26 4/28 5/3 5/5 5/8 5/12 5/16   whirlpool 10 10 NP  10 10 10 10 10 10

## 2023-06-01 ENCOUNTER — RX RENEWAL (OUTPATIENT)
Age: 78
End: 2023-06-01

## 2023-06-02 LAB — INR PPP: 3.1

## 2023-06-05 ENCOUNTER — NON-APPOINTMENT (OUTPATIENT)
Age: 78
End: 2023-06-05

## 2023-06-05 ENCOUNTER — APPOINTMENT (OUTPATIENT)
Dept: CARDIOLOGY | Facility: CLINIC | Age: 78
End: 2023-06-05
Payer: MEDICARE

## 2023-06-05 VITALS
DIASTOLIC BLOOD PRESSURE: 78 MMHG | HEART RATE: 81 BPM | SYSTOLIC BLOOD PRESSURE: 132 MMHG | WEIGHT: 234 LBS | OXYGEN SATURATION: 97 % | RESPIRATION RATE: 17 BRPM | BODY MASS INDEX: 44.18 KG/M2 | HEIGHT: 61 IN

## 2023-06-05 PROCEDURE — 99215 OFFICE O/P EST HI 40 MIN: CPT

## 2023-06-05 PROCEDURE — 93000 ELECTROCARDIOGRAM COMPLETE: CPT

## 2023-06-05 NOTE — PHYSICAL EXAM
[Normal S1, S2] : normal S1, S2 [No Rub] : no rub [No Gallop] : no gallop [Murmur] : murmur [Normal] : alert and oriented, normal memory [de-identified] : ll/Vl systolic

## 2023-06-05 NOTE — PROGRESS NOTES
Pt made small gains and performs adls with improved efficiency  Remains limited in walking and heavier activities  Overall progress reached a plateau and pt will be discharged at this time   Pt to consider participation in independent fitness program

## 2023-06-05 NOTE — DISCUSSION/SUMMARY
[FreeTextEntry1] : 77-year-old woman with history of CAD, status post prior coronary intervention, anticoagulated for factor V Leiden, aortic stenosis, hypertension, abnormal EKG (left bundle branch block).\par She is moderately active and has been asymptomatic with respect to cardiac issues.\par Blood pressure stable.  There is no JVD.  Lung fields are clear.  No edema.  She is euvolemic.\par EKG is sinus rhythm.  Left bundle branch block.\par Echocardiogram done earlier this year.  Normal left ventricular systolic function, moderate aortic stenosis.\par Current cardiac status appears to be stable.  \par \par Plan\par 1.  Current medication list is reviewed, no changes.\par 2.  Advised again regarding weight loss.\par 3.  She will follow-up with me in the office in 4 months.\par 4.  Advised her to monitor for cardiac symptoms and to notify me if there are any changes or if she has any other concerns.  Cardiac issues were discussed, all questions answered.\par

## 2023-06-05 NOTE — HISTORY OF PRESENT ILLNESS
[FreeTextEntry1] : She has been feeling well since her last visit with me.\par She has been doing structured program with aquatic exercises.  Tolerating well without any cardiopulmonary complaints.\par She did have a mechanical fall about 2 weeks ago and injured her right toe.\par No cardiac complaints.  No chest pain or chest pressure.  No shortness of breath.  Denies palpitations.  No dizziness.  No syncope.  No edema, orthopnea.  No PND.\par Remains on anticoagulation.  Denies any excessive ecchymosis, bleeding, black or bloody stools, hematuria or epistaxis.\par \par

## 2023-06-05 NOTE — CARDIOLOGY SUMMARY
[___] : [unfilled] [de-identified] : June 5 2023. Sinus  Rhythm  -First degree A-V block \par Jose = 256\par -Left bundle branch block. ABNORMAL \par \par  [de-identified] : Feb 21, 2023. Normal LV function. Moderate aortic stenosis

## 2023-06-08 ENCOUNTER — LABORATORY RESULT (OUTPATIENT)
Age: 78
End: 2023-06-08

## 2023-06-09 ENCOUNTER — APPOINTMENT (OUTPATIENT)
Dept: FAMILY MEDICINE | Facility: CLINIC | Age: 78
End: 2023-06-09
Payer: MEDICARE

## 2023-06-09 VITALS
HEIGHT: 61 IN | SYSTOLIC BLOOD PRESSURE: 136 MMHG | WEIGHT: 234 LBS | RESPIRATION RATE: 18 BRPM | TEMPERATURE: 97.37 F | DIASTOLIC BLOOD PRESSURE: 64 MMHG | HEART RATE: 87 BPM | OXYGEN SATURATION: 98 % | BODY MASS INDEX: 44.18 KG/M2

## 2023-06-09 DIAGNOSIS — R26.9 UNSPECIFIED ABNORMALITIES OF GAIT AND MOBILITY: ICD-10-CM

## 2023-06-09 DIAGNOSIS — M79.671 PAIN IN RIGHT FOOT: ICD-10-CM

## 2023-06-09 DIAGNOSIS — L84 CORNS AND CALLOSITIES: ICD-10-CM

## 2023-06-09 DIAGNOSIS — E78.1 PURE HYPERGLYCERIDEMIA: ICD-10-CM

## 2023-06-09 DIAGNOSIS — D64.9 ANEMIA, UNSPECIFIED: ICD-10-CM

## 2023-06-09 PROCEDURE — 99215 OFFICE O/P EST HI 40 MIN: CPT

## 2023-06-09 RX ORDER — AZITHROMYCIN 250 MG/1
250 TABLET, FILM COATED ORAL
Qty: 6 | Refills: 0 | Status: DISCONTINUED | COMMUNITY
Start: 2023-03-25 | End: 2023-06-09

## 2023-06-09 NOTE — CURRENT MEDS
[Takes medication as prescribed] : takes [None] : Patient does not have any barriers to medication adherence [No] : Did not review medication list for presence of high-risk medications. [Blood Thinners] : blood thinners

## 2023-06-09 NOTE — HEALTH RISK ASSESSMENT
[Never] : Never [No] : In the past 12 months have you used drugs other than those required for medical reasons? No [No falls in past year] : Patient reported no falls in the past year [de-identified] : sedentary [de-identified] : regular, has not been watching diet

## 2023-06-09 NOTE — HISTORY OF PRESENT ILLNESS
[FreeTextEntry8] : She completed hydrotherapy but will resume it in [facility] area since she fell last week after tripping and hurt her right foot and toes were bruised. She has poor balance when she tried to do heel toe walk. She brought in diabetic shoes form to be filled out. She has foot callus.  She has Diabetes Mellitus and suffers from heel pain and  Feet calluses. She is being treated with comprehensive diabetes care plan and needs therapeutic shoes and /or inserts because of Diabetes. She has not been eating well and has gained weight. She had blood work done yesterday.

## 2023-06-09 NOTE — PLAN
[FreeTextEntry1] : resume PT.\par Diabetic Form completed. \par  Diabetic diet.low fat diet. \par  exercise.\par check Iron studies. repeat labs in 3  months.

## 2023-06-09 NOTE — PHYSICAL EXAM
[No Acute Distress] : no acute distress [Well Nourished] : well nourished [Well Developed] : well developed [Well-Appearing] : well-appearing [Normal Sclera/Conjunctiva] : normal sclera/conjunctiva [PERRL] : pupils equal round and reactive to light [EOMI] : extraocular movements intact [Normal Outer Ear/Nose] : the outer ears and nose were normal in appearance [Normal Oropharynx] : the oropharynx was normal [No JVD] : no jugular venous distention [No Lymphadenopathy] : no lymphadenopathy [Supple] : supple [Thyroid Normal, No Nodules] : the thyroid was normal and there were no nodules present [No Respiratory Distress] : no respiratory distress  [No Accessory Muscle Use] : no accessory muscle use [Clear to Auscultation] : lungs were clear to auscultation bilaterally [Normal Rate] : normal rate  [Regular Rhythm] : with a regular rhythm [Normal S1, S2] : normal S1 and S2 [No Murmur] : no murmur heard [No Carotid Bruits] : no carotid bruits [No Abdominal Bruit] : a ~M bruit was not heard ~T in the abdomen [No Varicosities] : no varicosities [Pedal Pulses Present] : the pedal pulses are present [No Edema] : there was no peripheral edema [No Palpable Aorta] : no palpable aorta [No Extremity Clubbing/Cyanosis] : no extremity clubbing/cyanosis [Soft] : abdomen soft [Non Tender] : non-tender [Non-distended] : non-distended [No Masses] : no abdominal mass palpated [No HSM] : no HSM [Normal Bowel Sounds] : normal bowel sounds [Normal Posterior Cervical Nodes] : no posterior cervical lymphadenopathy [Normal Anterior Cervical Nodes] : no anterior cervical lymphadenopathy [No CVA Tenderness] : no CVA  tenderness [No Spinal Tenderness] : no spinal tenderness [No Joint Swelling] : no joint swelling [Grossly Normal Strength/Tone] : grossly normal strength/tone [No Rash] : no rash [Coordination Grossly Intact] : coordination grossly intact [No Focal Deficits] : no focal deficits [Normal Gait] : normal gait [Deep Tendon Reflexes (DTR)] : deep tendon reflexes were 2+ and symmetric [Normal Affect] : the affect was normal [Normal Insight/Judgement] : insight and judgment were intact [Comprehensive Foot Exam Normal] : Right and left foot were examined and both feet are normal. No ulcers in either foot. Toes are normal and with full ROM.  Normal tactile sensation with monofilament testing throughout both feet [de-identified] : except  foot callus

## 2023-06-09 NOTE — REVIEW OF SYSTEMS
[Unsteady Walking] : ataxia [Negative] : Heme/Lymph [Joint Pain] : no joint pain [Suicidal] : not suicidal [Insomnia] : no insomnia [Anxiety] : no anxiety [Depression] : no depression [FreeTextEntry9] : right foot pain

## 2023-06-12 LAB
ALBUMIN SERPL ELPH-MCNC: 4.5 G/DL
ALP BLD-CCNC: 48 U/L
ALT SERPL-CCNC: 27 U/L
ANION GAP SERPL CALC-SCNC: 15 MMOL/L
APPEARANCE: CLEAR
AST SERPL-CCNC: 28 U/L
BILIRUB SERPL-MCNC: 0.4 MG/DL
BILIRUBIN URINE: NEGATIVE
BLOOD URINE: NEGATIVE
BUN SERPL-MCNC: 38 MG/DL
CALCIUM SERPL-MCNC: 9.6 MG/DL
CHLORIDE SERPL-SCNC: 102 MMOL/L
CHOLEST SERPL-MCNC: 166 MG/DL
CO2 SERPL-SCNC: 23 MMOL/L
COLOR: YELLOW
CREAT SERPL-MCNC: 1 MG/DL
CREAT SPEC-SCNC: 28 MG/DL
CREAT/PROT UR: 0.6 RATIO
EGFR: 58 ML/MIN/1.73M2
ESTIMATED AVERAGE GLUCOSE: 212 MG/DL
GLUCOSE QUALITATIVE U: NEGATIVE MG/DL
GLUCOSE SERPL-MCNC: 192 MG/DL
HBA1C MFR BLD HPLC: 9 %
HDLC SERPL-MCNC: 43 MG/DL
KETONES URINE: NEGATIVE MG/DL
LDLC SERPL CALC-MCNC: 63 MG/DL
LEUKOCYTE ESTERASE URINE: ABNORMAL
NITRITE URINE: NEGATIVE
NONHDLC SERPL-MCNC: 123 MG/DL
PH URINE: 6
POTASSIUM SERPL-SCNC: 4.7 MMOL/L
PROT SERPL-MCNC: 7.6 G/DL
PROT UR-MCNC: 17 MG/DL
PROTEIN URINE: NORMAL MG/DL
SODIUM SERPL-SCNC: 139 MMOL/L
SPECIFIC GRAVITY URINE: 1.01
TRIGL SERPL-MCNC: 305 MG/DL
TSH SERPL-ACNC: 0.85 UIU/ML
UROBILINOGEN URINE: 0.2 MG/DL

## 2023-06-13 DIAGNOSIS — L30.4 ERYTHEMA INTERTRIGO: ICD-10-CM

## 2023-06-13 LAB
FERRITIN SERPL-MCNC: 59 NG/ML
FOLATE SERPL-MCNC: >20 NG/ML
IRON SATN MFR SERPL: 17 %
IRON SERPL-MCNC: 67 UG/DL
TIBC SERPL-MCNC: 393 UG/DL
UIBC SERPL-MCNC: 327 UG/DL
VIT B12 SERPL-MCNC: 444 PG/ML

## 2023-06-13 RX ORDER — GLIMEPIRIDE 2 MG/1
2 TABLET ORAL TWICE DAILY
Qty: 180 | Refills: 1 | Status: ACTIVE | COMMUNITY
Start: 2023-06-13 | End: 1900-01-01

## 2023-06-13 RX ORDER — NYSTATIN 100000 1/G
100000 POWDER TOPICAL 3 TIMES DAILY
Qty: 1 | Refills: 1 | Status: ACTIVE | COMMUNITY
Start: 2023-06-13 | End: 1900-01-01

## 2023-06-19 LAB — INR PPP: 2.8

## 2023-07-26 ENCOUNTER — APPOINTMENT (OUTPATIENT)
Dept: NEUROLOGY | Facility: CLINIC | Age: 78
End: 2023-07-26

## 2023-07-27 ENCOUNTER — APPOINTMENT (OUTPATIENT)
Dept: FAMILY MEDICINE | Facility: CLINIC | Age: 78
End: 2023-07-27
Payer: MEDICARE

## 2023-07-27 VITALS
OXYGEN SATURATION: 96 % | WEIGHT: 237 LBS | DIASTOLIC BLOOD PRESSURE: 74 MMHG | BODY MASS INDEX: 44.75 KG/M2 | TEMPERATURE: 97.2 F | HEART RATE: 82 BPM | RESPIRATION RATE: 17 BRPM | SYSTOLIC BLOOD PRESSURE: 130 MMHG | HEIGHT: 61 IN

## 2023-07-27 DIAGNOSIS — R35.0 FREQUENCY OF MICTURITION: ICD-10-CM

## 2023-07-27 DIAGNOSIS — N39.0 URINARY TRACT INFECTION, SITE NOT SPECIFIED: ICD-10-CM

## 2023-07-27 PROCEDURE — 99213 OFFICE O/P EST LOW 20 MIN: CPT

## 2023-07-27 RX ORDER — PHENAZOPYRIDINE HYDROCHLORIDE 200 MG/1
200 TABLET ORAL 3 TIMES DAILY
Qty: 15 | Refills: 0 | Status: ACTIVE | COMMUNITY
Start: 2023-07-27 | End: 1900-01-01

## 2023-07-27 NOTE — HISTORY OF PRESENT ILLNESS
[de-identified] : Patient presents with urinary frequency, burning, urgency and hesitancy. HAs been persistent for a couple weeks.  Was unable to comment since she was in [place].  Elected not to be seen at urgent care\par Also notes fatigue\par Denies fever, chills. or abdominal pains. \par \par INtermittent diarrhea \par Notes significant pelvic rash that has worsened with urinary frequency.\par Reports sugar numbers  have been mildly elevated with UTI.

## 2023-07-27 NOTE — PHYSICAL EXAM
[No Acute Distress] : no acute distress [Well Nourished] : well nourished [Well Developed] : well developed [Well-Appearing] : well-appearing [Normal Sclera/Conjunctiva] : normal sclera/conjunctiva [PERRL] : pupils equal round and reactive to light [EOMI] : extraocular movements intact [Normal Outer Ear/Nose] : the outer ears and nose were normal in appearance [Normal Oropharynx] : the oropharynx was normal [No JVD] : no jugular venous distention [No Lymphadenopathy] : no lymphadenopathy [Supple] : supple [Thyroid Normal, No Nodules] : the thyroid was normal and there were no nodules present [No Respiratory Distress] : no respiratory distress  [No Accessory Muscle Use] : no accessory muscle use [Clear to Auscultation] : lungs were clear to auscultation bilaterally [Normal Rate] : normal rate  [Regular Rhythm] : with a regular rhythm [Normal S1, S2] : normal S1 and S2 [No Murmur] : no murmur heard [No Carotid Bruits] : no carotid bruits [No Abdominal Bruit] : a ~M bruit was not heard ~T in the abdomen [No Varicosities] : no varicosities [Pedal Pulses Present] : the pedal pulses are present [No Edema] : there was no peripheral edema [No Palpable Aorta] : no palpable aorta [No Extremity Clubbing/Cyanosis] : no extremity clubbing/cyanosis [Soft] : abdomen soft [Non Tender] : non-tender [Non-distended] : non-distended [No Masses] : no abdominal mass palpated [No HSM] : no HSM [Normal Bowel Sounds] : normal bowel sounds [Normal Posterior Cervical Nodes] : no posterior cervical lymphadenopathy [Normal Anterior Cervical Nodes] : no anterior cervical lymphadenopathy [No CVA Tenderness] : no CVA  tenderness [No Spinal Tenderness] : no spinal tenderness [No Joint Swelling] : no joint swelling [Grossly Normal Strength/Tone] : grossly normal strength/tone [No Rash] : no rash [Coordination Grossly Intact] : coordination grossly intact [No Focal Deficits] : no focal deficits [Normal Gait] : normal gait [Deep Tendon Reflexes (DTR)] : deep tendon reflexes were 2+ and symmetric [Normal Affect] : the affect was normal [Normal Insight/Judgement] : insight and judgment were intact [de-identified] : Very mild suprapubic tenderness [de-identified] : Erythematous rash noted in pelvic folds with noted satellite lesions

## 2023-07-27 NOTE — PLAN
[FreeTextEntry1] : We will treat empirically for UTI.\par UA/UC sent\par Pyridium for burning\par Discussed better glycemic control\par Continue current ointments and powder for tinea cruris.  Also can apply calcium agitate to prevent worsening rash.\par Follow-up if symptoms persist or worsen

## 2023-07-27 NOTE — REVIEW OF SYSTEMS
[Dysuria] : dysuria [Incontinence] : incontinence [Frequency] : frequency [Skin Rash] : skin rash [Negative] : Heme/Lymph

## 2023-08-01 ENCOUNTER — RX RENEWAL (OUTPATIENT)
Age: 78
End: 2023-08-01

## 2023-08-02 LAB
APPEARANCE: ABNORMAL
BACTERIA UR CULT: ABNORMAL
BACTERIA: ABNORMAL /HPF
BILIRUBIN URINE: NEGATIVE
BLOOD URINE: NEGATIVE
CAST: 2 /LPF
COLOR: YELLOW
EPITHELIAL CELLS: 8 /HPF
GLUCOSE QUALITATIVE U: NEGATIVE MG/DL
HYALINE CASTS: PRESENT
KETONES URINE: ABNORMAL MG/DL
LEUKOCYTE ESTERASE URINE: ABNORMAL
MICROSCOPIC-UA: NORMAL
NITRITE URINE: POSITIVE
PH URINE: 5.5
PROTEIN URINE: 30 MG/DL
RED BLOOD CELLS URINE: 1 /HPF
REVIEW: NORMAL
SPECIFIC GRAVITY URINE: 1.02
UROBILINOGEN URINE: 0.2 MG/DL
WBC CLUMPS: PRESENT
WHITE BLOOD CELLS URINE: 166 /HPF
YEAST-LIKE CELLS: PRESENT

## 2023-08-17 ENCOUNTER — RX RENEWAL (OUTPATIENT)
Age: 78
End: 2023-08-17

## 2023-08-18 ENCOUNTER — RX RENEWAL (OUTPATIENT)
Age: 78
End: 2023-08-18

## 2023-08-18 LAB
APPEARANCE: CLEAR
BILIRUBIN URINE: NEGATIVE
BLOOD URINE: NEGATIVE
COLOR: YELLOW
GLUCOSE QUALITATIVE U: NEGATIVE MG/DL
KETONES URINE: NEGATIVE MG/DL
LEUKOCYTE ESTERASE URINE: NEGATIVE
NITRITE URINE: NEGATIVE
PH URINE: 6
PROTEIN URINE: NEGATIVE MG/DL
SPECIFIC GRAVITY URINE: 1.01
UROBILINOGEN URINE: 0.2 MG/DL

## 2023-08-30 NOTE — PATIENT PROFILE ADULT - BRADEN NUTRITION
Patient has given consent to record this visit for documentation in their clinical record.     (3) adequate

## 2023-09-05 ENCOUNTER — RX RENEWAL (OUTPATIENT)
Age: 78
End: 2023-09-05

## 2023-09-11 ENCOUNTER — RX RENEWAL (OUTPATIENT)
Age: 78
End: 2023-09-11

## 2023-09-25 ENCOUNTER — RX RENEWAL (OUTPATIENT)
Age: 78
End: 2023-09-25

## 2023-09-29 LAB — INR PPP: 2.1

## 2023-10-02 ENCOUNTER — APPOINTMENT (OUTPATIENT)
Dept: CARDIOLOGY | Facility: CLINIC | Age: 78
End: 2023-10-02

## 2023-10-06 ENCOUNTER — APPOINTMENT (OUTPATIENT)
Dept: FAMILY MEDICINE | Facility: CLINIC | Age: 78
End: 2023-10-06

## 2023-10-11 LAB — INR PPP: 3

## 2023-10-16 NOTE — DISCHARGE NOTE NURSING/CASE MANAGEMENT/SOCIAL WORK - NSDCPETBCESMAN_GEN_ALL_CORE
If you are a smoker, it is important for your health to stop smoking. Please be aware that second hand smoke is also harmful. Olanzapine Counseling- I discussed with the patient the common side effects of olanzapine including but are not limited to: lack of energy, dry mouth, increased appetite, sleepiness, tremor, constipation, dizziness, changes in behavior, or restlessness.  Explained that teenagers are more likely to experience headaches, abdominal pain, pain in the arms or legs, tiredness, and sleepiness.  Serious side effects include but are not limited: increased risk of death in elderly patients who are confused, have memory loss, or dementia-related psychosis; hyperglycemia; increased cholesterol and triglycerides; and weight gain.

## 2023-11-07 LAB — INR PPP: 3.1

## 2023-11-20 ENCOUNTER — RX RENEWAL (OUTPATIENT)
Age: 78
End: 2023-11-20

## 2023-11-20 RX ORDER — ROSUVASTATIN CALCIUM 20 MG/1
20 TABLET, FILM COATED ORAL
Qty: 90 | Refills: 3 | Status: ACTIVE | COMMUNITY
Start: 2019-04-24 | End: 1900-01-01

## 2023-11-21 ENCOUNTER — NON-APPOINTMENT (OUTPATIENT)
Age: 78
End: 2023-11-21

## 2023-11-21 ENCOUNTER — APPOINTMENT (OUTPATIENT)
Dept: CARDIOLOGY | Facility: CLINIC | Age: 78
End: 2023-11-21
Payer: MEDICARE

## 2023-11-21 VITALS
OXYGEN SATURATION: 96 % | BODY MASS INDEX: 43.43 KG/M2 | WEIGHT: 230 LBS | HEIGHT: 61 IN | DIASTOLIC BLOOD PRESSURE: 80 MMHG | HEART RATE: 68 BPM | RESPIRATION RATE: 17 BRPM | SYSTOLIC BLOOD PRESSURE: 154 MMHG

## 2023-11-21 LAB — INR PPP: 2.1

## 2023-11-21 PROCEDURE — 99215 OFFICE O/P EST HI 40 MIN: CPT

## 2023-11-21 PROCEDURE — 93000 ELECTROCARDIOGRAM COMPLETE: CPT

## 2023-11-27 ENCOUNTER — APPOINTMENT (OUTPATIENT)
Dept: FAMILY MEDICINE | Facility: CLINIC | Age: 78
End: 2023-11-27
Payer: MEDICARE

## 2023-11-27 VITALS
DIASTOLIC BLOOD PRESSURE: 86 MMHG | SYSTOLIC BLOOD PRESSURE: 132 MMHG | BODY MASS INDEX: 43.43 KG/M2 | TEMPERATURE: 97.7 F | OXYGEN SATURATION: 97 % | HEIGHT: 61 IN | WEIGHT: 230 LBS | HEART RATE: 85 BPM | RESPIRATION RATE: 18 BRPM

## 2023-11-27 DIAGNOSIS — N90.89 OTHER SPECIFIED NONINFLAMMATORY DISORDERS OF VULVA AND PERINEUM: ICD-10-CM

## 2023-11-27 DIAGNOSIS — R32 UNSPECIFIED URINARY INCONTINENCE: ICD-10-CM

## 2023-11-27 DIAGNOSIS — E66.9 OBESITY, UNSPECIFIED: ICD-10-CM

## 2023-11-27 DIAGNOSIS — Z83.2 FAMILY HISTORY OF DISEASES OF THE BLOOD AND BLOOD-FORMING ORGANS AND CERTAIN DISORDERS INVOLVING THE IMMUNE MECHANISM: ICD-10-CM

## 2023-11-27 PROCEDURE — G0008: CPT

## 2023-11-27 PROCEDURE — 90662 IIV NO PRSV INCREASED AG IM: CPT

## 2023-11-27 PROCEDURE — 99214 OFFICE O/P EST MOD 30 MIN: CPT | Mod: 25

## 2023-11-27 RX ORDER — NITROFURANTOIN (MONOHYDRATE/MACROCRYSTALS) 25; 75 MG/1; MG/1
100 CAPSULE ORAL
Qty: 14 | Refills: 0 | Status: DISCONTINUED | COMMUNITY
Start: 2023-07-27 | End: 2023-11-27

## 2023-11-27 RX ORDER — PNV NO.95/FERROUS FUM/FOLIC AC 28MG-0.8MG
TABLET ORAL
Refills: 0 | Status: ACTIVE | COMMUNITY

## 2023-12-04 ENCOUNTER — RX RENEWAL (OUTPATIENT)
Age: 78
End: 2023-12-04

## 2023-12-05 ENCOUNTER — RX RENEWAL (OUTPATIENT)
Age: 78
End: 2023-12-05

## 2023-12-05 RX ORDER — PANTOPRAZOLE 20 MG/1
20 TABLET, DELAYED RELEASE ORAL TWICE DAILY
Qty: 180 | Refills: 3 | Status: ACTIVE | COMMUNITY
Start: 2019-06-10 | End: 1900-01-01

## 2024-01-05 ENCOUNTER — RX RENEWAL (OUTPATIENT)
Age: 79
End: 2024-01-05

## 2024-01-10 ENCOUNTER — RX RENEWAL (OUTPATIENT)
Age: 79
End: 2024-01-10

## 2024-01-10 LAB — INR PPP: 2.3

## 2024-01-10 RX ORDER — WARFARIN 10 MG/1
10 TABLET ORAL
Qty: 90 | Refills: 3 | Status: ACTIVE | COMMUNITY
Start: 2021-07-13 | End: 1900-01-01

## 2024-01-16 LAB — INR PPP: 2.5

## 2024-02-06 LAB — INR PPP: 2.7

## 2024-02-21 PROBLEM — N30.00 ACUTE CYSTITIS WITHOUT HEMATURIA: Status: RESOLVED | Noted: 2018-03-27 | Resolved: 2024-02-21

## 2024-02-23 ENCOUNTER — RX RENEWAL (OUTPATIENT)
Age: 79
End: 2024-02-23

## 2024-03-07 LAB — INR PPP: 3.1

## 2024-03-08 ENCOUNTER — RX RENEWAL (OUTPATIENT)
Age: 79
End: 2024-03-08

## 2024-03-08 RX ORDER — CARVEDILOL 25 MG/1
25 TABLET, FILM COATED ORAL
Qty: 180 | Refills: 1 | Status: ACTIVE | COMMUNITY
Start: 2018-09-03 | End: 1900-01-01

## 2024-03-19 ENCOUNTER — APPOINTMENT (OUTPATIENT)
Dept: CARDIOLOGY | Facility: CLINIC | Age: 79
End: 2024-03-19

## 2024-03-20 ENCOUNTER — APPOINTMENT (OUTPATIENT)
Age: 79
End: 2024-03-20

## 2024-04-08 ENCOUNTER — RX RENEWAL (OUTPATIENT)
Age: 79
End: 2024-04-08

## 2024-04-08 RX ORDER — WARFARIN 2.5 MG/1
2.5 TABLET ORAL
Qty: 90 | Refills: 1 | Status: ACTIVE | COMMUNITY
Start: 2021-11-16 | End: 1900-01-01

## 2024-05-07 LAB — INR PPP: 2.1

## 2024-05-10 ENCOUNTER — APPOINTMENT (OUTPATIENT)
Dept: FAMILY MEDICINE | Facility: CLINIC | Age: 79
End: 2024-05-10
Payer: MEDICARE

## 2024-05-10 ENCOUNTER — LABORATORY RESULT (OUTPATIENT)
Age: 79
End: 2024-05-10

## 2024-05-10 VITALS
DIASTOLIC BLOOD PRESSURE: 64 MMHG | SYSTOLIC BLOOD PRESSURE: 122 MMHG | HEIGHT: 61 IN | TEMPERATURE: 96.8 F | HEART RATE: 79 BPM | RESPIRATION RATE: 18 BRPM | OXYGEN SATURATION: 99 %

## 2024-05-10 DIAGNOSIS — K21.9 GASTRO-ESOPHAGEAL REFLUX DISEASE W/OUT ESOPHAGITIS: ICD-10-CM

## 2024-05-10 DIAGNOSIS — D68.51 ACTIVATED PROTEIN C RESISTANCE: ICD-10-CM

## 2024-05-10 DIAGNOSIS — E11.9 TYPE 2 DIABETES MELLITUS W/OUT COMPLICATIONS: ICD-10-CM

## 2024-05-10 DIAGNOSIS — R79.89 OTHER SPECIFIED ABNORMAL FINDINGS OF BLOOD CHEMISTRY: ICD-10-CM

## 2024-05-10 DIAGNOSIS — J43.9 EMPHYSEMA, UNSPECIFIED: ICD-10-CM

## 2024-05-10 PROCEDURE — 99214 OFFICE O/P EST MOD 30 MIN: CPT

## 2024-05-10 RX ORDER — SULFAMETHOXAZOLE AND TRIMETHOPRIM 800; 160 MG/1; MG/1
800-160 TABLET ORAL TWICE DAILY
Qty: 14 | Refills: 0 | Status: DISCONTINUED | COMMUNITY
Start: 2023-11-27 | End: 2024-05-10

## 2024-05-10 RX ORDER — EMPAGLIFLOZIN AND METFORMIN HYDROCHLORIDE 12.5; 5 MG/1; MG/1
12.5-5 TABLET ORAL DAILY
Qty: 30 | Refills: 3 | Status: ACTIVE | COMMUNITY
Start: 2024-05-10 | End: 1900-01-01

## 2024-05-10 RX ORDER — EMPAGLIFLOZIN, METFORMIN HYDROCHLORIDE 10; 1000 MG/1; MG/1
10-1000 TABLET, EXTENDED RELEASE ORAL
Refills: 0 | Status: ACTIVE | COMMUNITY
Start: 2024-05-10

## 2024-05-10 RX ORDER — NYSTATIN 100000 [USP'U]/G
100000 POWDER TOPICAL
Qty: 60 | Refills: 0 | Status: DISCONTINUED | COMMUNITY
Start: 2023-08-01 | End: 2024-05-10

## 2024-05-10 RX ORDER — GLIMEPIRIDE 2 MG/1
2 TABLET ORAL
Qty: 180 | Refills: 1 | Status: DISCONTINUED | COMMUNITY
Start: 2019-01-31 | End: 2024-05-10

## 2024-05-10 RX ORDER — DULAGLUTIDE 1.5 MG/.5ML
1.5 INJECTION, SOLUTION SUBCUTANEOUS
Qty: 1 | Refills: 4 | Status: ACTIVE | COMMUNITY
Start: 2024-05-10 | End: 1900-01-01

## 2024-05-10 RX ORDER — METFORMIN HYDROCHLORIDE 1000 MG/1
1000 TABLET, COATED ORAL
Qty: 180 | Refills: 3 | Status: DISCONTINUED | COMMUNITY
Start: 2021-01-03 | End: 2024-05-10

## 2024-05-10 RX ORDER — NYSTATIN 100000 [USP'U]/G
100000 CREAM TOPICAL TWICE DAILY
Qty: 15 | Refills: 0 | Status: DISCONTINUED | COMMUNITY
Start: 2023-06-13 | End: 2024-05-10

## 2024-05-10 RX ORDER — ZOSTER VACCINE RECOMBINANT, ADJUVANTED 50 MCG/0.5
50 KIT INTRAMUSCULAR
Qty: 1 | Refills: 0 | Status: ACTIVE | COMMUNITY
Start: 2024-05-10 | End: 1900-01-01

## 2024-05-10 RX ORDER — POTASSIUM CHLORIDE 1500 MG/1
20 TABLET, EXTENDED RELEASE ORAL
Qty: 90 | Refills: 3 | Status: DISCONTINUED | COMMUNITY
Start: 2022-04-07 | End: 2024-05-10

## 2024-05-10 RX ORDER — ZOSTER VACCINE RECOMBINANT, ADJUVANTED 50 MCG/0.5
KIT INTRAMUSCULAR
Qty: 0.5 | Refills: 1 | Status: DISCONTINUED | COMMUNITY
Start: 2022-10-24 | End: 2024-05-10

## 2024-05-10 NOTE — PHYSICAL EXAM
[No Acute Distress] : no acute distress [EOMI] : extraocular movements intact [Supple] : supple [Clear to Auscultation] : lungs were clear to auscultation bilaterally [Normal S1, S2] : normal S1 and S2 [No Edema] : there was no peripheral edema [Non Tender] : non-tender [No Rash] : no rash [Normal Gait] : normal gait [Normal Affect] : the affect was normal [de-identified] : systolic ejection murmur

## 2024-05-10 NOTE — REVIEW OF SYSTEMS
[Joint Pain] : joint pain [Muscle Pain] : muscle pain [Back Pain] : back pain [Itching] : no itching [Headache] : no headache [Dizziness] : no dizziness [Fainting] : no fainting [Confusion] : no confusion [Memory Loss] : no memory loss [Unsteady Walking] : no ataxia [Negative] : Heme/Lymph

## 2024-05-10 NOTE — HISTORY OF PRESENT ILLNESS
Attempted PT assessment but patient was eating lunch. Will attempt again at a later time.      CLARE GómezT [FreeTextEntry1] : follow up DMII, lab work, medication optimization [de-identified] : Ms Zoila Wallace is a 79 yo female presents today for routine follow up. We will be addressing DMII today. Pt reports spend time between her home in the [facility] and here. Pt reports latest labs, still showing HgbA1c 9.0% which is uncontrolled. Pt however does admit, its due to her poor diet choices, and also reports inability to acquire the higher dose of Trulicity. Advised today will titrate up her dose of synjardy and Trulicity, advised to cut down high carb foods, concentrated sweets. Pt also reports keeps up with appointment podiatrist, and ophthalmologist.

## 2024-05-10 NOTE — ASSESSMENT
[FreeTextEntry1] : Approximately 30 min of face to face time spent, reviewed chart, prior lab work, addressed various health concerns, ordered necessary new labs and medication refill. Answered all pt questions, coordinated care for patient. Titrated dose of Trulicity and Synjardy.  f/u with PCP, follow ophthalmology and podiatrist referral to Endocrinology

## 2024-05-14 ENCOUNTER — NON-APPOINTMENT (OUTPATIENT)
Age: 79
End: 2024-05-14

## 2024-05-14 ENCOUNTER — APPOINTMENT (OUTPATIENT)
Dept: CARDIOLOGY | Facility: CLINIC | Age: 79
End: 2024-05-14
Payer: MEDICARE

## 2024-05-14 VITALS
SYSTOLIC BLOOD PRESSURE: 167 MMHG | WEIGHT: 230 LBS | RESPIRATION RATE: 19 BRPM | HEIGHT: 61 IN | HEART RATE: 75 BPM | BODY MASS INDEX: 43.43 KG/M2 | DIASTOLIC BLOOD PRESSURE: 76 MMHG | OXYGEN SATURATION: 97 %

## 2024-05-14 DIAGNOSIS — I35.0 NONRHEUMATIC AORTIC (VALVE) STENOSIS: ICD-10-CM

## 2024-05-14 DIAGNOSIS — I10 ESSENTIAL (PRIMARY) HYPERTENSION: ICD-10-CM

## 2024-05-14 DIAGNOSIS — Z79.01 LONG TERM (CURRENT) USE OF ANTICOAGULANTS: ICD-10-CM

## 2024-05-14 DIAGNOSIS — I25.10 ATHEROSCLEROTIC HEART DISEASE OF NATIVE CORONARY ARTERY W/OUT ANGINA PECTORIS: ICD-10-CM

## 2024-05-14 LAB
APPEARANCE: CLEAR
BILIRUBIN URINE: NEGATIVE
BLOOD URINE: NEGATIVE
CHOLEST SERPL-MCNC: 150 MG/DL
COLOR: YELLOW
CREAT SPEC-SCNC: 31 MG/DL
FOLATE SERPL-MCNC: >20 NG/ML
GLUCOSE QUALITATIVE U: >=1000 MG/DL
HDLC SERPL-MCNC: 36 MG/DL
KETONES URINE: NEGATIVE MG/DL
LDLC SERPL CALC-MCNC: 79 MG/DL
LEUKOCYTE ESTERASE URINE: ABNORMAL
MICROALBUMIN 24H UR DL<=1MG/L-MCNC: 1.7 MG/DL
MICROALBUMIN/CREAT 24H UR-RTO: 53 MG/G
NITRITE URINE: NEGATIVE
NONHDLC SERPL-MCNC: 114 MG/DL
PH URINE: 6
PROTEIN URINE: NEGATIVE MG/DL
SPECIFIC GRAVITY URINE: 1.01
TRIGL SERPL-MCNC: 206 MG/DL
TSH SERPL-ACNC: 0.59 UIU/ML
UROBILINOGEN URINE: 0.2 MG/DL
VIT B12 SERPL-MCNC: 929 PG/ML

## 2024-05-14 PROCEDURE — 93000 ELECTROCARDIOGRAM COMPLETE: CPT

## 2024-05-14 PROCEDURE — 99215 OFFICE O/P EST HI 40 MIN: CPT

## 2024-05-14 PROCEDURE — 93306 TTE W/DOPPLER COMPLETE: CPT

## 2024-05-14 RX ORDER — NITROFURANTOIN (MONOHYDRATE/MACROCRYSTALS) 25; 75 MG/1; MG/1
100 CAPSULE ORAL
Qty: 10 | Refills: 0 | Status: ACTIVE | COMMUNITY
Start: 2023-06-13 | End: 1900-01-01

## 2024-05-16 NOTE — DISCUSSION/SUMMARY
[EKG obtained to assist in diagnosis and management of assessed problem(s)] : EKG obtained to assist in diagnosis and management of assessed problem(s) [FreeTextEntry1] : 78-year-old woman with CAD, status post prior coronary intervention, aortic stenosis, hypertension, factor V Leiden, anticoagulated with warfarin. She has been asymptomatic with respect to cardiac issues. On physical examination, blood pressure elevated.  She is euvolemic.  Cardiac murmur is unchanged from prior. EKG sinus rhythm, first-degree AV block, left bundle branch block, largely unchanged from prior. Echocardiogram done today in the office.  Normal left ventricular systolic function.  Moderate to severe aortic stenosis.  The aortic valve area as calculated by the continuity occasion is 0.95 cm.  The mean gradient is 18 mmHg, the peak gradient is 30 mmHg.  The LV OT-AV VTI ratio is 0.27, suggesting moderate aortic stenosis.  Plan 1.  Echo findings were reviewed with the patient.  We did discuss that aortic stenosis will need to be monitored closely, and that in the future, she may need to be considered for valve replacement procedure. 2.  Current medications reviewed, no changes. 3.  She will follow-up with me in the office in 6 months, echocardiogram. 4.  The above was reviewed with the patient, all questions were answered to her satisfaction.

## 2024-05-16 NOTE — PHYSICAL EXAM
[Normal S1, S2] : normal S1, S2 [No Rub] : no rub [No Gallop] : no gallop [Murmur] : murmur [Normal] : alert and oriented, normal memory [de-identified] : ll/Vl systolic

## 2024-05-16 NOTE — CARDIOLOGY SUMMARY
[___] : [unfilled] [de-identified] : Nov 21 2023.  Sinus Rhythm -First degree A-V block  Jose = 230 -Left bundle branch block.  ABNORMAL  [de-identified] : Jan 18 2022. Normal vasodilator myocardial perfusion stress test  [de-identified] : Feb 21, 2023. Normal LV function. Moderate aortic stenosis  [de-identified] : May 29, 2019. s/p LAD stent for restenosis

## 2024-05-16 NOTE — HISTORY OF PRESENT ILLNESS
[FreeTextEntry1] : Since last visit with me, she remains moderately active.  She does work around her home, goes out for shopping. Does not formally exercise or go for regular walks. No complaints of chest pain or chest pressure.  No shortness of breath.  Denies palpitations.  No dizziness or lightheadedness.  No syncope.  No edema, no orthopnea.  No PND. Remains on anticoagulation.  Denies any excessive ecchymosis, bleeding, black or bloody stools, hematuria or epistaxis.

## 2024-05-16 NOTE — REASON FOR VISIT
[FreeTextEntry1] : Cardiology follow-up visit for evaluation management of CAD, status post coronary intervention, history of factor V Leiden, chronically anticoagulated, aortic stenosis and hypertension.

## 2024-05-17 ENCOUNTER — TELEPHONE (OUTPATIENT)
Dept: DERMATOLOGY | Facility: CLINIC | Age: 79
End: 2024-05-17

## 2024-05-19 ENCOUNTER — RX RENEWAL (OUTPATIENT)
Age: 79
End: 2024-05-19

## 2024-05-19 RX ORDER — WARFARIN 5 MG/1
5 TABLET ORAL DAILY
Qty: 90 | Refills: 1 | Status: ACTIVE | COMMUNITY
Start: 2020-01-03 | End: 1900-01-01

## 2024-06-18 LAB — INR PPP: 2

## 2024-06-25 ENCOUNTER — APPOINTMENT (OUTPATIENT)
Dept: ENDOCRINOLOGY | Facility: CLINIC | Age: 79
End: 2024-06-25

## 2024-06-28 ENCOUNTER — RX RENEWAL (OUTPATIENT)
Age: 79
End: 2024-06-28

## 2024-07-09 LAB — INR PPP: 3.1

## 2024-07-23 ENCOUNTER — RX RENEWAL (OUTPATIENT)
Age: 79
End: 2024-07-23

## 2024-08-17 NOTE — PATIENT PROFILE ADULT - NSPROPTRIGHTSUPPORTPERSON_GEN_A_NUR
declines
PAST MEDICAL HISTORY:  Childhood obesity, unspecified BMI, unspecified obesity type, unspecified whether serious comorbidity present     Head concussion

## 2024-08-20 ENCOUNTER — APPOINTMENT (OUTPATIENT)
Dept: FAMILY MEDICINE | Facility: CLINIC | Age: 79
End: 2024-08-20

## 2024-08-22 ENCOUNTER — APPOINTMENT (OUTPATIENT)
Dept: CARDIOLOGY | Facility: CLINIC | Age: 79
End: 2024-08-22
Payer: MEDICARE

## 2024-08-22 ENCOUNTER — RX RENEWAL (OUTPATIENT)
Age: 79
End: 2024-08-22

## 2024-08-22 ENCOUNTER — INPATIENT (INPATIENT)
Facility: HOSPITAL | Age: 79
LOS: 1 days | Discharge: HOME | DRG: 309 | End: 2024-08-24
Attending: STUDENT IN AN ORGANIZED HEALTH CARE EDUCATION/TRAINING PROGRAM | Admitting: STUDENT IN AN ORGANIZED HEALTH CARE EDUCATION/TRAINING PROGRAM
Payer: MEDICARE

## 2024-08-22 VITALS
RESPIRATION RATE: 20 BRPM | HEART RATE: 106 BPM | TEMPERATURE: 98 F | DIASTOLIC BLOOD PRESSURE: 74 MMHG | OXYGEN SATURATION: 97 % | SYSTOLIC BLOOD PRESSURE: 119 MMHG | WEIGHT: 227.96 LBS

## 2024-08-22 VITALS — SYSTOLIC BLOOD PRESSURE: 120 MMHG | DIASTOLIC BLOOD PRESSURE: 80 MMHG

## 2024-08-22 VITALS — BODY MASS INDEX: 43.43 KG/M2 | HEIGHT: 61 IN | OXYGEN SATURATION: 97 % | HEART RATE: 102 BPM | WEIGHT: 230 LBS

## 2024-08-22 DIAGNOSIS — D68.51 ACTIVATED PROTEIN C RESISTANCE: ICD-10-CM

## 2024-08-22 DIAGNOSIS — I48.92 UNSPECIFIED ATRIAL FLUTTER: ICD-10-CM

## 2024-08-22 DIAGNOSIS — I10 ESSENTIAL (PRIMARY) HYPERTENSION: ICD-10-CM

## 2024-08-22 DIAGNOSIS — I35.0 NONRHEUMATIC AORTIC (VALVE) STENOSIS: ICD-10-CM

## 2024-08-22 DIAGNOSIS — Z87.39 PERSONAL HISTORY OF OTHER DISEASES OF THE MUSCULOSKELETAL SYSTEM AND CONNECTIVE TISSUE: Chronic | ICD-10-CM

## 2024-08-22 DIAGNOSIS — T14.90XA INJURY, UNSPECIFIED, INITIAL ENCOUNTER: Chronic | ICD-10-CM

## 2024-08-22 DIAGNOSIS — E11.9 TYPE 2 DIABETES MELLITUS WITHOUT COMPLICATIONS: ICD-10-CM

## 2024-08-22 DIAGNOSIS — I38 ENDOCARDITIS, VALVE UNSPECIFIED: ICD-10-CM

## 2024-08-22 DIAGNOSIS — J44.9 CHRONIC OBSTRUCTIVE PULMONARY DISEASE, UNSPECIFIED: ICD-10-CM

## 2024-08-22 DIAGNOSIS — I25.10 ATHEROSCLEROTIC HEART DISEASE OF NATIVE CORONARY ARTERY W/OUT ANGINA PECTORIS: ICD-10-CM

## 2024-08-22 DIAGNOSIS — Z90.89 ACQUIRED ABSENCE OF OTHER ORGANS: Chronic | ICD-10-CM

## 2024-08-22 DIAGNOSIS — Z98.890 OTHER SPECIFIED POSTPROCEDURAL STATES: Chronic | ICD-10-CM

## 2024-08-22 DIAGNOSIS — Z90.710 ACQUIRED ABSENCE OF BOTH CERVIX AND UTERUS: Chronic | ICD-10-CM

## 2024-08-22 DIAGNOSIS — I25.10 ATHEROSCLEROTIC HEART DISEASE OF NATIVE CORONARY ARTERY WITHOUT ANGINA PECTORIS: ICD-10-CM

## 2024-08-22 DIAGNOSIS — Z90.49 ACQUIRED ABSENCE OF OTHER SPECIFIED PARTS OF DIGESTIVE TRACT: Chronic | ICD-10-CM

## 2024-08-22 LAB
ADD ON TEST-SPECIMEN IN LAB: SIGNIFICANT CHANGE UP
ALBUMIN SERPL ELPH-MCNC: 4.4 G/DL — SIGNIFICANT CHANGE UP (ref 3.3–5)
ALP SERPL-CCNC: 52 U/L — SIGNIFICANT CHANGE UP (ref 40–120)
ALT FLD-CCNC: 27 U/L — SIGNIFICANT CHANGE UP (ref 10–45)
ANION GAP SERPL CALC-SCNC: 17 MMOL/L — SIGNIFICANT CHANGE UP (ref 5–17)
APTT BLD: 28.2 SEC — SIGNIFICANT CHANGE UP (ref 24.5–35.6)
AST SERPL-CCNC: 43 U/L — HIGH (ref 10–40)
BASOPHILS # BLD AUTO: 0.07 K/UL — SIGNIFICANT CHANGE UP (ref 0–0.2)
BASOPHILS NFR BLD AUTO: 0.9 % — SIGNIFICANT CHANGE UP (ref 0–2)
BILIRUB SERPL-MCNC: 0.5 MG/DL — SIGNIFICANT CHANGE UP (ref 0.2–1.2)
BUN SERPL-MCNC: 47 MG/DL — HIGH (ref 7–23)
CALCIUM SERPL-MCNC: 9.9 MG/DL — SIGNIFICANT CHANGE UP (ref 8.4–10.5)
CHLORIDE SERPL-SCNC: 99 MMOL/L — SIGNIFICANT CHANGE UP (ref 96–108)
CO2 SERPL-SCNC: 21 MMOL/L — LOW (ref 22–31)
CREAT SERPL-MCNC: 1.33 MG/DL — HIGH (ref 0.5–1.3)
EGFR: 41 ML/MIN/1.73M2 — LOW
EGFR: 41 ML/MIN/1.73M2 — LOW
EOSINOPHIL # BLD AUTO: 0.19 K/UL — SIGNIFICANT CHANGE UP (ref 0–0.5)
EOSINOPHIL NFR BLD AUTO: 2.5 % — SIGNIFICANT CHANGE UP (ref 0–6)
GLUCOSE BLDC GLUCOMTR-MCNC: 114 MG/DL — HIGH (ref 70–99)
GLUCOSE SERPL-MCNC: 123 MG/DL — HIGH (ref 70–99)
HCT VFR BLD CALC: 36.2 % — SIGNIFICANT CHANGE UP (ref 34.5–45)
HGB BLD-MCNC: 11.7 G/DL — SIGNIFICANT CHANGE UP (ref 11.5–15.5)
IMM GRANULOCYTES NFR BLD AUTO: 0.4 % — SIGNIFICANT CHANGE UP (ref 0–0.9)
INR BLD: 1.82 RATIO — HIGH (ref 0.85–1.18)
LYMPHOCYTES # BLD AUTO: 2.33 K/UL — SIGNIFICANT CHANGE UP (ref 1–3.3)
LYMPHOCYTES # BLD AUTO: 30.6 % — SIGNIFICANT CHANGE UP (ref 13–44)
MAGNESIUM SERPL-MCNC: 1.8 MG/DL — SIGNIFICANT CHANGE UP (ref 1.6–2.6)
MCHC RBC-ENTMCNC: 29 PG — SIGNIFICANT CHANGE UP (ref 27–34)
MCHC RBC-ENTMCNC: 32.3 GM/DL — SIGNIFICANT CHANGE UP (ref 32–36)
MCV RBC AUTO: 89.8 FL — SIGNIFICANT CHANGE UP (ref 80–100)
MONOCYTES # BLD AUTO: 0.79 K/UL — SIGNIFICANT CHANGE UP (ref 0–0.9)
MONOCYTES NFR BLD AUTO: 10.4 % — SIGNIFICANT CHANGE UP (ref 2–14)
NEUTROPHILS # BLD AUTO: 4.2 K/UL — SIGNIFICANT CHANGE UP (ref 1.8–7.4)
NEUTROPHILS NFR BLD AUTO: 55.2 % — SIGNIFICANT CHANGE UP (ref 43–77)
NRBC # BLD: 0 /100 WBCS — SIGNIFICANT CHANGE UP (ref 0–0)
NRBC BLD-RTO: 0 /100 WBCS — SIGNIFICANT CHANGE UP (ref 0–0)
PLATELET # BLD AUTO: 245 K/UL — SIGNIFICANT CHANGE UP (ref 150–400)
POTASSIUM SERPL-MCNC: 5.1 MMOL/L — SIGNIFICANT CHANGE UP (ref 3.5–5.3)
POTASSIUM SERPL-SCNC: 5.1 MMOL/L — SIGNIFICANT CHANGE UP (ref 3.5–5.3)
PROT SERPL-MCNC: 8.4 G/DL — HIGH (ref 6–8.3)
PROTHROM AB SERPL-ACNC: 18.8 SEC — HIGH (ref 9.5–13)
RBC # BLD: 4.03 M/UL — SIGNIFICANT CHANGE UP (ref 3.8–5.2)
RBC # FLD: 13.2 % — SIGNIFICANT CHANGE UP (ref 10.3–14.5)
SODIUM SERPL-SCNC: 137 MMOL/L — SIGNIFICANT CHANGE UP (ref 135–145)
WBC # BLD: 7.61 K/UL — SIGNIFICANT CHANGE UP (ref 3.8–10.5)
WBC # FLD AUTO: 7.61 K/UL — SIGNIFICANT CHANGE UP (ref 3.8–10.5)

## 2024-08-22 PROCEDURE — 71045 X-RAY EXAM CHEST 1 VIEW: CPT | Mod: 26

## 2024-08-22 PROCEDURE — 93000 ELECTROCARDIOGRAM COMPLETE: CPT | Mod: PD

## 2024-08-22 PROCEDURE — 99215 OFFICE O/P EST HI 40 MIN: CPT

## 2024-08-22 PROCEDURE — G2211 COMPLEX E/M VISIT ADD ON: CPT | Mod: PD

## 2024-08-22 PROCEDURE — 99285 EMERGENCY DEPT VISIT HI MDM: CPT | Mod: GC

## 2024-08-22 PROCEDURE — 99223 1ST HOSP IP/OBS HIGH 75: CPT

## 2024-08-22 RX ORDER — SODIUM CHLORIDE 9 G/1000ML
1000 INJECTION, SOLUTION INTRAVENOUS
Refills: 0 | Status: DISCONTINUED | OUTPATIENT
Start: 2024-08-22 | End: 2024-08-24

## 2024-08-22 RX ORDER — ONDANSETRON HCL/PF 4 MG/2 ML
4 VIAL (ML) INJECTION EVERY 8 HOURS
Refills: 0 | Status: DISCONTINUED | OUTPATIENT
Start: 2024-08-22 | End: 2024-08-24

## 2024-08-22 RX ORDER — MELATONIN 5 MG
3 TABLET ORAL AT BEDTIME
Refills: 0 | Status: DISCONTINUED | OUTPATIENT
Start: 2024-08-22 | End: 2024-08-24

## 2024-08-22 RX ORDER — LORATADINE 5 MG/5ML
10 SOLUTION ORAL DAILY
Refills: 0 | Status: DISCONTINUED | OUTPATIENT
Start: 2024-08-22 | End: 2024-08-24

## 2024-08-22 RX ORDER — DEXTROSE 50 % IN WATER 50 %
12.5 SYRINGE (ML) INTRAVENOUS ONCE
Refills: 0 | Status: DISCONTINUED | OUTPATIENT
Start: 2024-08-22 | End: 2024-08-24

## 2024-08-22 RX ORDER — CLOPIDOGREL BISULFATE 75 MG/1
75 TABLET, FILM COATED ORAL DAILY
Refills: 0 | Status: DISCONTINUED | OUTPATIENT
Start: 2024-08-23 | End: 2024-08-24

## 2024-08-22 RX ORDER — INSULIN GLARGINE-YFGN 100 [IU]/ML
16 INJECTION, SOLUTION SUBCUTANEOUS AT BEDTIME
Refills: 0 | Status: DISCONTINUED | OUTPATIENT
Start: 2024-08-22 | End: 2024-08-24

## 2024-08-22 RX ORDER — INSULIN LISPRO 100 U/ML
INJECTION, SOLUTION INTRAVENOUS; SUBCUTANEOUS
Refills: 0 | Status: DISCONTINUED | OUTPATIENT
Start: 2024-08-22 | End: 2024-08-24

## 2024-08-22 RX ORDER — AMLODIPINE BESYLATE 10 MG/1
5 TABLET ORAL DAILY
Refills: 0 | Status: DISCONTINUED | OUTPATIENT
Start: 2024-08-22 | End: 2024-08-24

## 2024-08-22 RX ORDER — LISINOPRIL 5 MG/1
5 TABLET ORAL DAILY
Refills: 0 | Status: DISCONTINUED | OUTPATIENT
Start: 2024-08-22 | End: 2024-08-24

## 2024-08-22 RX ORDER — DEXTROSE 50 % IN WATER 50 %
15 SYRINGE (ML) INTRAVENOUS ONCE
Refills: 0 | Status: DISCONTINUED | OUTPATIENT
Start: 2024-08-22 | End: 2024-08-24

## 2024-08-22 RX ORDER — INSULIN LISPRO 100 U/ML
INJECTION, SOLUTION INTRAVENOUS; SUBCUTANEOUS AT BEDTIME
Refills: 0 | Status: DISCONTINUED | OUTPATIENT
Start: 2024-08-22 | End: 2024-08-24

## 2024-08-22 RX ORDER — ROSUVASTATIN CALCIUM 20 MG/1
20 TABLET, FILM COATED ORAL AT BEDTIME
Refills: 0 | Status: DISCONTINUED | OUTPATIENT
Start: 2024-08-22 | End: 2024-08-24

## 2024-08-22 RX ORDER — CARVEDILOL 3.12 MG/1
25 TABLET, FILM COATED ORAL EVERY 12 HOURS
Refills: 0 | Status: DISCONTINUED | OUTPATIENT
Start: 2024-08-22 | End: 2024-08-24

## 2024-08-22 RX ORDER — FUROSEMIDE 10 MG/ML
40 INJECTION INTRAMUSCULAR; INTRAVENOUS DAILY
Refills: 0 | Status: DISCONTINUED | OUTPATIENT
Start: 2024-08-22 | End: 2024-08-24

## 2024-08-22 RX ORDER — DEXTROSE 50 % IN WATER 50 %
25 SYRINGE (ML) INTRAVENOUS ONCE
Refills: 0 | Status: DISCONTINUED | OUTPATIENT
Start: 2024-08-22 | End: 2024-08-24

## 2024-08-22 RX ORDER — ACETAMINOPHEN 500 MG/5ML
650 LIQUID (ML) ORAL EVERY 6 HOURS
Refills: 0 | Status: DISCONTINUED | OUTPATIENT
Start: 2024-08-22 | End: 2024-08-24

## 2024-08-22 RX ORDER — ZINC SULFATE 50(220)MG
220 CAPSULE ORAL DAILY
Refills: 0 | Status: DISCONTINUED | OUTPATIENT
Start: 2024-08-22 | End: 2024-08-24

## 2024-08-22 RX ORDER — GLUCAGON 3 MG/1
1 POWDER NASAL ONCE
Refills: 0 | Status: DISCONTINUED | OUTPATIENT
Start: 2024-08-22 | End: 2024-08-24

## 2024-08-22 RX ADMIN — CARVEDILOL 25 MILLIGRAM(S): 3.12 TABLET, FILM COATED ORAL at 23:37

## 2024-08-22 RX ADMIN — INSULIN GLARGINE-YFGN 16 UNIT(S): 100 INJECTION, SOLUTION SUBCUTANEOUS at 23:37

## 2024-08-22 RX ADMIN — Medication 7.5 MILLIGRAM(S): at 23:37

## 2024-08-22 NOTE — PHYSICAL EXAM
[5th Left ICS - MCL] : palpated at the 5th LICS in the midclavicular line [Normal] : normal [Normal Rate] : normal [Irregularly Irregular] : irregularly irregular [Normal S1] : normal S1 [Normal S2] : normal S2 [III] : a grade 3

## 2024-08-22 NOTE — ASSESSMENT
[FreeTextEntry1] : In summary, the patient is a 79-year-old woman with multiple cardiac issues who now presents with fatigue shortness of breath and generalized malaise most probably due to her new onset atrial flutter.  I had a long discussion with the patient and her daughter.  I feel the best approach in this case would be immediate hospitalization for consideration of cardioversion to restore her to sinus rhythm.  She has been on long-term anticoagulation with warfarin due to her factor V deficiency.  I have phoned and discussed the case with Dr. Gaspar of electrophysiology he is in agreement with the plan and the patient and her daughter will be going to [facility]

## 2024-08-22 NOTE — REASON FOR VISIT
[Other: ____] : [unfilled] [FreeTextEntry1] : This is a 79-year-old woman normally followed by Dr. Larson.  She has a history of coronary disease with stents she has a history of factor V deficiency.  She has a history of moderate to severe calcific aortic stenosis.  She has a history of a left bundle branch block pattern.  Over the last 2 to 3 weeks she has had increasing fatigue and shortness of breath following a febrile illness.  The patient states she had fever for 1 or at the most 2 days.  She denies any chest discomfort he denies any palpitations she just generally does not feel well.

## 2024-08-22 NOTE — CARDIOLOGY SUMMARY
[de-identified] : August 22, 2024 Atrial flutter moderate ventricular response rate 92 left bundle branch block pattern noted

## 2024-08-23 ENCOUNTER — RESULT REVIEW (OUTPATIENT)
Age: 79
End: 2024-08-23

## 2024-08-23 LAB
A1C WITH ESTIMATED AVERAGE GLUCOSE RESULT: 7.7 % — HIGH (ref 4–5.6)
ALBUMIN SERPL ELPH-MCNC: 4.3 G/DL — SIGNIFICANT CHANGE UP (ref 3.3–5)
ALP SERPL-CCNC: 49 U/L — SIGNIFICANT CHANGE UP (ref 40–120)
ALT FLD-CCNC: 23 U/L — SIGNIFICANT CHANGE UP (ref 10–45)
ANION GAP SERPL CALC-SCNC: 16 MMOL/L — SIGNIFICANT CHANGE UP (ref 5–17)
AST SERPL-CCNC: 27 U/L — SIGNIFICANT CHANGE UP (ref 10–40)
BILIRUB SERPL-MCNC: 0.6 MG/DL — SIGNIFICANT CHANGE UP (ref 0.2–1.2)
BUN SERPL-MCNC: 43 MG/DL — HIGH (ref 7–23)
CALCIUM SERPL-MCNC: 9.6 MG/DL — SIGNIFICANT CHANGE UP (ref 8.4–10.5)
CHLORIDE SERPL-SCNC: 102 MMOL/L — SIGNIFICANT CHANGE UP (ref 96–108)
CO2 SERPL-SCNC: 24 MMOL/L — SIGNIFICANT CHANGE UP (ref 22–31)
CREAT SERPL-MCNC: 1.17 MG/DL — SIGNIFICANT CHANGE UP (ref 0.5–1.3)
EGFR: 48 ML/MIN/1.73M2 — LOW
EGFR: 48 ML/MIN/1.73M2 — LOW
ESTIMATED AVERAGE GLUCOSE: 174 MG/DL — HIGH (ref 68–114)
GLUCOSE BLDC GLUCOMTR-MCNC: 145 MG/DL — HIGH (ref 70–99)
GLUCOSE BLDC GLUCOMTR-MCNC: 147 MG/DL — HIGH (ref 70–99)
GLUCOSE BLDC GLUCOMTR-MCNC: 177 MG/DL — HIGH (ref 70–99)
GLUCOSE BLDC GLUCOMTR-MCNC: 194 MG/DL — HIGH (ref 70–99)
GLUCOSE SERPL-MCNC: 108 MG/DL — HIGH (ref 70–99)
HCT VFR BLD CALC: 33.9 % — LOW (ref 34.5–45)
HGB BLD-MCNC: 11.3 G/DL — LOW (ref 11.5–15.5)
INR BLD: 1.71 RATIO — HIGH (ref 0.85–1.18)
MCHC RBC-ENTMCNC: 29.8 PG — SIGNIFICANT CHANGE UP (ref 27–34)
MCHC RBC-ENTMCNC: 33.3 GM/DL — SIGNIFICANT CHANGE UP (ref 32–36)
MCV RBC AUTO: 89.4 FL — SIGNIFICANT CHANGE UP (ref 80–100)
NRBC # BLD: 0 /100 WBCS — SIGNIFICANT CHANGE UP (ref 0–0)
NRBC BLD-RTO: 0 /100 WBCS — SIGNIFICANT CHANGE UP (ref 0–0)
PLATELET # BLD AUTO: 241 K/UL — SIGNIFICANT CHANGE UP (ref 150–400)
POTASSIUM SERPL-MCNC: 4.1 MMOL/L — SIGNIFICANT CHANGE UP (ref 3.5–5.3)
POTASSIUM SERPL-SCNC: 4.1 MMOL/L — SIGNIFICANT CHANGE UP (ref 3.5–5.3)
PROT SERPL-MCNC: 8 G/DL — SIGNIFICANT CHANGE UP (ref 6–8.3)
PROTHROM AB SERPL-ACNC: 17.7 SEC — HIGH (ref 9.5–13)
RBC # BLD: 3.79 M/UL — LOW (ref 3.8–5.2)
RBC # FLD: 13.2 % — SIGNIFICANT CHANGE UP (ref 10.3–14.5)
SODIUM SERPL-SCNC: 142 MMOL/L — SIGNIFICANT CHANGE UP (ref 135–145)
TROPONIN T, HIGH SENSITIVITY RESULT: 60 NG/L — HIGH (ref 0–51)
TROPONIN T, HIGH SENSITIVITY RESULT: 61 NG/L — HIGH (ref 0–51)
TSH SERPL-MCNC: 1.05 UIU/ML — SIGNIFICANT CHANGE UP (ref 0.27–4.2)
TSH SERPL-MCNC: 1.26 UIU/ML — SIGNIFICANT CHANGE UP (ref 0.27–4.2)
WBC # BLD: 6 K/UL — SIGNIFICANT CHANGE UP (ref 3.8–10.5)
WBC # FLD AUTO: 6 K/UL — SIGNIFICANT CHANGE UP (ref 3.8–10.5)

## 2024-08-23 PROCEDURE — 93010 ELECTROCARDIOGRAM REPORT: CPT

## 2024-08-23 PROCEDURE — 92960 CARDIOVERSION ELECTRIC EXT: CPT

## 2024-08-23 PROCEDURE — 93320 DOPPLER ECHO COMPLETE: CPT | Mod: 26

## 2024-08-23 PROCEDURE — 99233 SBSQ HOSP IP/OBS HIGH 50: CPT

## 2024-08-23 PROCEDURE — 93312 ECHO TRANSESOPHAGEAL: CPT | Mod: 26

## 2024-08-23 PROCEDURE — 93325 DOPPLER ECHO COLOR FLOW MAPG: CPT | Mod: 26

## 2024-08-23 RX ORDER — ENOXAPARIN SODIUM 100 MG/ML
100 INJECTION SUBCUTANEOUS EVERY 12 HOURS
Refills: 0 | Status: DISCONTINUED | OUTPATIENT
Start: 2024-08-23 | End: 2024-08-24

## 2024-08-23 RX ADMIN — INSULIN GLARGINE-YFGN 16 UNIT(S): 100 INJECTION, SOLUTION SUBCUTANEOUS at 22:39

## 2024-08-23 RX ADMIN — ENOXAPARIN SODIUM 100 MILLIGRAM(S): 100 INJECTION SUBCUTANEOUS at 08:54

## 2024-08-23 RX ADMIN — LISINOPRIL 5 MILLIGRAM(S): 5 TABLET ORAL at 05:26

## 2024-08-23 RX ADMIN — AMLODIPINE BESYLATE 5 MILLIGRAM(S): 10 TABLET ORAL at 05:26

## 2024-08-23 RX ADMIN — FUROSEMIDE 40 MILLIGRAM(S): 10 INJECTION INTRAMUSCULAR; INTRAVENOUS at 05:26

## 2024-08-23 RX ADMIN — CLOPIDOGREL BISULFATE 75 MILLIGRAM(S): 75 TABLET, FILM COATED ORAL at 12:44

## 2024-08-23 RX ADMIN — Medication 40 MILLIGRAM(S): at 05:26

## 2024-08-23 RX ADMIN — CARVEDILOL 25 MILLIGRAM(S): 3.12 TABLET, FILM COATED ORAL at 20:36

## 2024-08-23 RX ADMIN — Medication 7.5 MILLIGRAM(S): at 21:48

## 2024-08-23 RX ADMIN — ROSUVASTATIN CALCIUM 20 MILLIGRAM(S): 20 TABLET, FILM COATED ORAL at 21:48

## 2024-08-23 RX ADMIN — INSULIN LISPRO 1: 100 INJECTION, SOLUTION INTRAVENOUS; SUBCUTANEOUS at 08:53

## 2024-08-23 RX ADMIN — ENOXAPARIN SODIUM 100 MILLIGRAM(S): 100 INJECTION SUBCUTANEOUS at 20:35

## 2024-08-23 RX ADMIN — CARVEDILOL 25 MILLIGRAM(S): 3.12 TABLET, FILM COATED ORAL at 05:26

## 2024-08-24 ENCOUNTER — TRANSCRIPTION ENCOUNTER (OUTPATIENT)
Age: 79
End: 2024-08-24

## 2024-08-24 ENCOUNTER — NON-APPOINTMENT (OUTPATIENT)
Age: 79
End: 2024-08-24

## 2024-08-24 VITALS
SYSTOLIC BLOOD PRESSURE: 114 MMHG | RESPIRATION RATE: 18 BRPM | DIASTOLIC BLOOD PRESSURE: 64 MMHG | HEART RATE: 81 BPM | OXYGEN SATURATION: 95 % | TEMPERATURE: 99 F

## 2024-08-24 LAB
GLUCOSE BLDC GLUCOMTR-MCNC: 143 MG/DL — HIGH (ref 70–99)
GLUCOSE BLDC GLUCOMTR-MCNC: 164 MG/DL — HIGH (ref 70–99)
HCT VFR BLD CALC: 34.5 % — SIGNIFICANT CHANGE UP (ref 34.5–45)
HGB BLD-MCNC: 11.2 G/DL — LOW (ref 11.5–15.5)
INR BLD: 2.18 RATIO — HIGH (ref 0.85–1.18)
MCHC RBC-ENTMCNC: 29.3 PG — SIGNIFICANT CHANGE UP (ref 27–34)
MCHC RBC-ENTMCNC: 32.5 GM/DL — SIGNIFICANT CHANGE UP (ref 32–36)
MCV RBC AUTO: 90.3 FL — SIGNIFICANT CHANGE UP (ref 80–100)
NRBC # BLD: 0 /100 WBCS — SIGNIFICANT CHANGE UP (ref 0–0)
NRBC BLD-RTO: 0 /100 WBCS — SIGNIFICANT CHANGE UP (ref 0–0)
PLATELET # BLD AUTO: 231 K/UL — SIGNIFICANT CHANGE UP (ref 150–400)
PROTHROM AB SERPL-ACNC: 22.4 SEC — HIGH (ref 9.5–13)
RBC # BLD: 3.82 M/UL — SIGNIFICANT CHANGE UP (ref 3.8–5.2)
RBC # FLD: 13.4 % — SIGNIFICANT CHANGE UP (ref 10.3–14.5)
T4 FREE SERPL-MCNC: 1.3 NG/DL — SIGNIFICANT CHANGE UP (ref 0.9–1.8)
TSH SERPL-MCNC: 1.26 UIU/ML — SIGNIFICANT CHANGE UP (ref 0.27–4.2)
WBC # BLD: 6.67 K/UL — SIGNIFICANT CHANGE UP (ref 3.8–10.5)
WBC # FLD AUTO: 6.67 K/UL — SIGNIFICANT CHANGE UP (ref 3.8–10.5)

## 2024-08-24 PROCEDURE — 93320 DOPPLER ECHO COMPLETE: CPT

## 2024-08-24 PROCEDURE — 85610 PROTHROMBIN TIME: CPT

## 2024-08-24 PROCEDURE — 93312 ECHO TRANSESOPHAGEAL: CPT

## 2024-08-24 PROCEDURE — 84439 ASSAY OF FREE THYROXINE: CPT

## 2024-08-24 PROCEDURE — 84484 ASSAY OF TROPONIN QUANT: CPT

## 2024-08-24 PROCEDURE — 93005 ELECTROCARDIOGRAM TRACING: CPT

## 2024-08-24 PROCEDURE — 71045 X-RAY EXAM CHEST 1 VIEW: CPT

## 2024-08-24 PROCEDURE — 99239 HOSP IP/OBS DSCHRG MGMT >30: CPT

## 2024-08-24 PROCEDURE — 92960 CARDIOVERSION ELECTRIC EXT: CPT

## 2024-08-24 PROCEDURE — 85730 THROMBOPLASTIN TIME PARTIAL: CPT

## 2024-08-24 PROCEDURE — 36415 COLL VENOUS BLD VENIPUNCTURE: CPT

## 2024-08-24 PROCEDURE — 99285 EMERGENCY DEPT VISIT HI MDM: CPT

## 2024-08-24 PROCEDURE — 82962 GLUCOSE BLOOD TEST: CPT

## 2024-08-24 PROCEDURE — 83735 ASSAY OF MAGNESIUM: CPT

## 2024-08-24 PROCEDURE — 83036 HEMOGLOBIN GLYCOSYLATED A1C: CPT

## 2024-08-24 PROCEDURE — 93325 DOPPLER ECHO COLOR FLOW MAPG: CPT

## 2024-08-24 PROCEDURE — 85025 COMPLETE CBC W/AUTO DIFF WBC: CPT

## 2024-08-24 PROCEDURE — 85027 COMPLETE CBC AUTOMATED: CPT

## 2024-08-24 PROCEDURE — 80053 COMPREHEN METABOLIC PANEL: CPT

## 2024-08-24 PROCEDURE — 84443 ASSAY THYROID STIM HORMONE: CPT

## 2024-08-24 RX ADMIN — AMLODIPINE BESYLATE 5 MILLIGRAM(S): 10 TABLET ORAL at 05:21

## 2024-08-24 RX ADMIN — INSULIN LISPRO 1: 100 INJECTION, SOLUTION INTRAVENOUS; SUBCUTANEOUS at 08:37

## 2024-08-24 RX ADMIN — FUROSEMIDE 40 MILLIGRAM(S): 10 INJECTION INTRAMUSCULAR; INTRAVENOUS at 05:21

## 2024-08-24 RX ADMIN — Medication 220 MILLIGRAM(S): at 14:07

## 2024-08-24 RX ADMIN — CLOPIDOGREL BISULFATE 75 MILLIGRAM(S): 75 TABLET, FILM COATED ORAL at 14:07

## 2024-08-24 RX ADMIN — ENOXAPARIN SODIUM 100 MILLIGRAM(S): 100 INJECTION SUBCUTANEOUS at 05:20

## 2024-08-24 RX ADMIN — Medication 40 MILLIGRAM(S): at 05:21

## 2024-08-24 RX ADMIN — CARVEDILOL 25 MILLIGRAM(S): 3.12 TABLET, FILM COATED ORAL at 05:20

## 2024-08-24 RX ADMIN — LISINOPRIL 5 MILLIGRAM(S): 5 TABLET ORAL at 05:21

## 2024-08-27 ENCOUNTER — NON-APPOINTMENT (OUTPATIENT)
Age: 79
End: 2024-08-27

## 2024-08-27 DIAGNOSIS — J30.2 OTHER SEASONAL ALLERGIC RHINITIS: ICD-10-CM

## 2024-08-27 RX ORDER — WARFARIN 7.5 MG/1
7.5 TABLET ORAL
Refills: 0 | Status: ACTIVE | COMMUNITY
Start: 2024-08-27

## 2024-08-27 RX ORDER — CETIRIZINE HYDROCHLORIDE 10 MG/1
10 TABLET, FILM COATED ORAL DAILY
Qty: 1 | Refills: 0 | Status: ACTIVE | COMMUNITY
Start: 2024-08-27

## 2024-08-27 RX ORDER — ELECTROLYTES/DEXTROSE
31G X 8 MM SOLUTION, ORAL ORAL
Qty: 1 | Refills: 1 | Status: ACTIVE | COMMUNITY
Start: 2024-08-27 | End: 1900-01-01

## 2024-08-27 RX ORDER — GLIPIZIDE AND METFORMIN HYDROCHLORIDE 5; 500 MG/1; MG/1
5-500 TABLET, FILM COATED ORAL TWICE DAILY
Refills: 0 | Status: ACTIVE | COMMUNITY
Start: 2024-08-27

## 2024-08-27 RX ORDER — INSULIN GLARGINE 100 [IU]/ML
100 INJECTION, SOLUTION SUBCUTANEOUS AT BEDTIME
Qty: 5 | Refills: 0 | Status: ACTIVE | COMMUNITY
Start: 2024-08-27

## 2024-08-27 RX ORDER — WARFARIN 5 MG/1
5 TABLET ORAL
Refills: 0 | Status: ACTIVE | COMMUNITY
Start: 2024-08-27

## 2024-08-27 RX ORDER — COLD-HOT PACK
50 EACH MISCELLANEOUS DAILY
Refills: 0 | Status: ACTIVE | COMMUNITY
Start: 2024-08-27

## 2024-09-04 ENCOUNTER — APPOINTMENT (OUTPATIENT)
Dept: FAMILY MEDICINE | Facility: CLINIC | Age: 79
End: 2024-09-04
Payer: MEDICARE

## 2024-09-04 VITALS
DIASTOLIC BLOOD PRESSURE: 88 MMHG | HEIGHT: 61 IN | OXYGEN SATURATION: 98 % | BODY MASS INDEX: 43.23 KG/M2 | WEIGHT: 229 LBS | RESPIRATION RATE: 18 BRPM | HEART RATE: 75 BPM | SYSTOLIC BLOOD PRESSURE: 136 MMHG

## 2024-09-04 DIAGNOSIS — N39.0 URINARY TRACT INFECTION, SITE NOT SPECIFIED: ICD-10-CM

## 2024-09-04 DIAGNOSIS — G47.30 SLEEP APNEA, UNSPECIFIED: ICD-10-CM

## 2024-09-04 DIAGNOSIS — I35.0 NONRHEUMATIC AORTIC (VALVE) STENOSIS: ICD-10-CM

## 2024-09-04 DIAGNOSIS — D64.9 ANEMIA, UNSPECIFIED: ICD-10-CM

## 2024-09-04 DIAGNOSIS — E11.9 TYPE 2 DIABETES MELLITUS W/OUT COMPLICATIONS: ICD-10-CM

## 2024-09-04 DIAGNOSIS — I48.92 UNSPECIFIED ATRIAL FLUTTER: ICD-10-CM

## 2024-09-04 DIAGNOSIS — J43.9 EMPHYSEMA, UNSPECIFIED: ICD-10-CM

## 2024-09-04 DIAGNOSIS — Z79.01 LONG TERM (CURRENT) USE OF ANTICOAGULANTS: ICD-10-CM

## 2024-09-04 PROCEDURE — 99495 TRANSJ CARE MGMT MOD F2F 14D: CPT

## 2024-09-04 NOTE — PHYSICAL EXAM
[No Acute Distress] : no acute distress [Well Nourished] : well nourished [Well Developed] : well developed [Well-Appearing] : well-appearing [Normal Sclera/Conjunctiva] : normal sclera/conjunctiva [PERRL] : pupils equal round and reactive to light [EOMI] : extraocular movements intact [Normal Outer Ear/Nose] : the outer ears and nose were normal in appearance [Normal Oropharynx] : the oropharynx was normal [No JVD] : no jugular venous distention [No Lymphadenopathy] : no lymphadenopathy [Supple] : supple [Thyroid Normal, No Nodules] : the thyroid was normal and there were no nodules present [No Respiratory Distress] : no respiratory distress  [No Accessory Muscle Use] : no accessory muscle use [Normal Rate] : normal rate  [Clear to Auscultation] : lungs were clear to auscultation bilaterally [Regular Rhythm] : with a regular rhythm [Normal S1, S2] : normal S1 and S2 [No Murmur] : no murmur heard [No Carotid Bruits] : no carotid bruits [No Abdominal Bruit] : a ~M bruit was not heard ~T in the abdomen [No Varicosities] : no varicosities [Pedal Pulses Present] : the pedal pulses are present [No Edema] : there was no peripheral edema [No Palpable Aorta] : no palpable aorta [No Extremity Clubbing/Cyanosis] : no extremity clubbing/cyanosis [Soft] : abdomen soft [Non Tender] : non-tender [Non-distended] : non-distended [No Masses] : no abdominal mass palpated [No HSM] : no HSM [Normal Bowel Sounds] : normal bowel sounds [Normal Posterior Cervical Nodes] : no posterior cervical lymphadenopathy [Normal Anterior Cervical Nodes] : no anterior cervical lymphadenopathy [No CVA Tenderness] : no CVA  tenderness [No Spinal Tenderness] : no spinal tenderness [No Joint Swelling] : no joint swelling [Grossly Normal Strength/Tone] : grossly normal strength/tone [No Rash] : no rash [Coordination Grossly Intact] : coordination grossly intact [No Focal Deficits] : no focal deficits [Normal Gait] : normal gait [Deep Tendon Reflexes (DTR)] : deep tendon reflexes were 2+ and symmetric [Normal Affect] : the affect was normal [Normal Insight/Judgement] : insight and judgment were intact [00542 - Moderate Complexity requires multiple possible diagnoses and/or the management options, moderate complexity of the medical data (tests, etc.) to be reviewed, and moderate risk of significant complications, morbidity, and/or mortality as well as co] : Moderate Complexity

## 2024-09-04 NOTE — PLAN
[FreeTextEntry1] : exercise as tolerated.   meds reviewed.flu vaccine in 10/2024.  cardiology referral.  continue AC. pt. checks her Coumadin level at home.

## 2024-09-04 NOTE — HISTORY OF PRESENT ILLNESS
[Post-hospitalization from ___ Hospital] : Post-hospitalization from [unfilled] Hospital [Admitted on: ___] : The patient was admitted on [unfilled] [Discharged on ___] : discharged on [unfilled] [Discharge Summary] : discharge summary [Pertinent Labs] : pertinent labs [Radiology Findings] : radiology findings [Discharge Med List] : discharge medication list [Med Reconciliation] : medication reconciliation has been completed [Patient Contacted By: ____] : and contacted by [unfilled] [FreeTextEntry2] : Here for f/u after hospitalization for Atrial Flutter. She underwent cardioversion and started on AC.She is following cardiac diet. Her blood sugar was high until past three days. She is planning to join [facility] yoga and cardio and feels well. BS readings are now 120-135, fasting. labs reviewed. HBA1C 7.7.

## 2024-09-04 NOTE — HEALTH RISK ASSESSMENT
[With Patient/Caregiver] : , with patient/caregiver [Designated Healthcare Proxy] : Designated healthcare proxy [Never] : Never [NO] : No [HIV test declined] : HIV test declined [Hepatitis C test declined] : Hepatitis C test declined [None] : None [With Family] : lives with family [Retired] : retired [] :  [Feels Safe at Home] : Feels safe at home [Fully functional (bathing, dressing, toileting, transferring, walking, feeding)] : Fully functional (bathing, dressing, toileting, transferring, walking, feeding) [Fully functional (using the telephone, shopping, preparing meals, housekeeping, doing laundry, using] : Fully functional and needs no help or supervision to perform IADLs (using the telephone, shopping, preparing meals, housekeeping, doing laundry, using transportation, managing medications and managing finances) [No] : In the past 12 months have you used drugs other than those required for medical reasons? No [No falls in past year] : Patient reported no falls in the past year [0] : 2) Feeling down, depressed, or hopeless: Not at all (0) [PHQ-2 Negative - No further assessment needed] : PHQ-2 Negative - No further assessment needed [Patient reported mammogram was normal] : Patient reported mammogram was normal [Patient declined PAP Smear] : Patient declined PAP Smear [Patient reported bone density results were normal] : Patient reported bone density results were normal [de-identified] : sedentary [de-identified] : regular [[id number]] : 0 [Change in mental status noted] : No change in mental status noted [Language] : denies difficulty with language [Handling Complex Tasks] : denies difficulty handling complex tasks [Reports changes in hearing] : Reports no changes in hearing [Reports changes in vision] : Reports no changes in vision [Reports changes in dental health] : Reports no changes in dental health [Smoke Detector] : no smoke detector [Carbon Monoxide Detector] : no carbon monoxide detector [Seat Belt] : does not use seat belt [Sunscreen] : does not use sunscreen [MammogramDate] : 09/23 [BoneDensityDate] : 01/22 [ColonoscopyComments] : cologuard ordered [AdvancecareDate] : 09/24

## 2024-09-06 LAB — INR PPP: 3.2

## 2024-09-25 LAB — INR PPP: 2.8

## 2024-10-07 LAB — NONINV COLON CA DNA+OCC BLD SCRN STL QL: NEGATIVE

## 2024-10-29 ENCOUNTER — APPOINTMENT (OUTPATIENT)
Dept: ELECTROPHYSIOLOGY | Facility: CLINIC | Age: 79
End: 2024-10-29

## 2024-10-29 ENCOUNTER — APPOINTMENT (OUTPATIENT)
Dept: ELECTROPHYSIOLOGY | Facility: CLINIC | Age: 79
End: 2024-10-29
Payer: MEDICARE

## 2024-10-29 ENCOUNTER — NON-APPOINTMENT (OUTPATIENT)
Age: 79
End: 2024-10-29

## 2024-10-29 VITALS
BODY MASS INDEX: 43.8 KG/M2 | WEIGHT: 232 LBS | OXYGEN SATURATION: 98 % | DIASTOLIC BLOOD PRESSURE: 77 MMHG | HEART RATE: 73 BPM | HEIGHT: 61 IN | SYSTOLIC BLOOD PRESSURE: 136 MMHG

## 2024-10-29 DIAGNOSIS — I48.92 UNSPECIFIED ATRIAL FLUTTER: ICD-10-CM

## 2024-10-29 PROCEDURE — 99205 OFFICE O/P NEW HI 60 MIN: CPT

## 2024-10-29 PROCEDURE — 93000 ELECTROCARDIOGRAM COMPLETE: CPT

## 2024-11-12 ENCOUNTER — NON-APPOINTMENT (OUTPATIENT)
Age: 79
End: 2024-11-12

## 2024-11-12 LAB — INR PPP: 2.3

## 2024-11-15 PROCEDURE — 93248 EXT ECG>7D<15D REV&INTERPJ: CPT

## 2024-11-22 LAB — INR PPP: 2.4

## 2024-11-26 ENCOUNTER — NON-APPOINTMENT (OUTPATIENT)
Age: 79
End: 2024-11-26

## 2024-11-26 ENCOUNTER — APPOINTMENT (OUTPATIENT)
Dept: CARDIOLOGY | Facility: CLINIC | Age: 79
End: 2024-11-26
Payer: MEDICARE

## 2024-11-26 VITALS
WEIGHT: 227 LBS | HEIGHT: 61 IN | DIASTOLIC BLOOD PRESSURE: 72 MMHG | BODY MASS INDEX: 42.86 KG/M2 | HEART RATE: 89 BPM | SYSTOLIC BLOOD PRESSURE: 142 MMHG

## 2024-11-26 DIAGNOSIS — I25.10 ATHEROSCLEROTIC HEART DISEASE OF NATIVE CORONARY ARTERY W/OUT ANGINA PECTORIS: ICD-10-CM

## 2024-11-26 DIAGNOSIS — I35.0 NONRHEUMATIC AORTIC (VALVE) STENOSIS: ICD-10-CM

## 2024-11-26 DIAGNOSIS — I48.92 UNSPECIFIED ATRIAL FLUTTER: ICD-10-CM

## 2024-11-26 DIAGNOSIS — Z79.01 LONG TERM (CURRENT) USE OF ANTICOAGULANTS: ICD-10-CM

## 2024-11-26 PROCEDURE — 99215 OFFICE O/P EST HI 40 MIN: CPT

## 2024-11-26 PROCEDURE — 93306 TTE W/DOPPLER COMPLETE: CPT

## 2024-11-26 PROCEDURE — 93000 ELECTROCARDIOGRAM COMPLETE: CPT

## 2024-11-26 PROCEDURE — G2211 COMPLEX E/M VISIT ADD ON: CPT

## 2024-12-12 ENCOUNTER — PROCEDURE VISIT (OUTPATIENT)
Dept: DERMATOLOGY | Facility: CLINIC | Age: 79
End: 2024-12-12
Payer: MEDICARE

## 2024-12-12 ENCOUNTER — TELEPHONE (OUTPATIENT)
Age: 79
End: 2024-12-12

## 2024-12-12 VITALS
HEART RATE: 76 BPM | SYSTOLIC BLOOD PRESSURE: 134 MMHG | DIASTOLIC BLOOD PRESSURE: 62 MMHG | BODY MASS INDEX: 41.52 KG/M2 | TEMPERATURE: 98.2 F | WEIGHT: 227 LBS | OXYGEN SATURATION: 99 %

## 2024-12-12 DIAGNOSIS — C44.311 BASAL CELL CARCINOMA OF RIGHT NASAL SIDEWALL: Primary | ICD-10-CM

## 2024-12-12 LAB — INR PPP: 3

## 2024-12-12 PROCEDURE — 17312 MOHS ADDL STAGE: CPT | Performed by: STUDENT IN AN ORGANIZED HEALTH CARE EDUCATION/TRAINING PROGRAM

## 2024-12-12 PROCEDURE — 17311 MOHS 1 STAGE H/N/HF/G: CPT | Performed by: STUDENT IN AN ORGANIZED HEALTH CARE EDUCATION/TRAINING PROGRAM

## 2024-12-12 PROCEDURE — 14061 TIS TRNFR E/N/E/L10.1-30SQCM: CPT | Performed by: STUDENT IN AN ORGANIZED HEALTH CARE EDUCATION/TRAINING PROGRAM

## 2024-12-12 RX ORDER — INSULIN GLARGINE 100 [IU]/ML
20 INJECTION, SOLUTION SUBCUTANEOUS DAILY
COMMUNITY
Start: 2024-10-23

## 2024-12-12 RX ORDER — EMPAGLIFLOZIN AND METFORMIN HYDROCHLORIDE 12.5; 1 MG/1; MG/1
TABLET ORAL
COMMUNITY

## 2024-12-12 RX ORDER — GLIPIZIDE AND METFORMIN HCL 5; 500 MG/1; MG/1
1 TABLET, FILM COATED ORAL
COMMUNITY
Start: 2024-07-01 | End: 2025-07-01

## 2024-12-12 NOTE — TELEPHONE ENCOUNTER
Patients son called asking what building they should go into for the mohs advised it is the cancer building first floor

## 2024-12-12 NOTE — PATIENT INSTRUCTIONS
"Mohs Microscopic Surgery After Care    WOUND CARE AFTER SURGERY:    Do NOT to remove the pressure bandage for 48 hours. Keep the area clean and dry while this bandage is on.    After removing the bandage for the first time, gently clean the area with soap and water. If the bandage is difficult to remove, getting the bandage wet in the shower will sometimes help soften the adhesive and allow it to be removed more easily.     You will now need to cleanse this area daily in the shower with gentle soap. There is no need to scrub the area. You will need to apply plain Vaseline ointment (this is over the counter and not a prescription) to the site for up to 2 weeks followed by a clean appropriately sized bandage to area.  Non stick dressing and paper tape (or Hypafix) are recommended for sensitive skin but a bandaid is fine if it covers the area well.    All your stitches will dissolve over the next two weeks. You will need to keep these moist with Vaseline and covered with a bandage over the next 2 weeks for them to dissolve appropriately.    RESTRICTIONS:     For two DAYS:   - You will need to take it very easy as this time is highest risk for bleeding. Being a \"couch potato\" during these two days is generally recommended.   - For surgeries on the face/neck/scalp: Avoid leaning down to pick things up off the floor as this brings blood up to your head. Instead, squat down to pick things up.     For two WEEKS:   - No heavy lifting (anything greater than 10 pounds)   - You can start to do slow, gentle activities such as slow walking but nothing to increase your heart rate and blood pressure too much (such as cardiovascular exercise). It is important to take it easy as there is still a risk for bleeding and a high risk popping of stitches open during this time.     If we did surgery near the eyes (including the nose, forehead, front part of your scalp, cheeks): It is VERY common to get a large amount of swelling around your " eyes (puffy eyes). Although less frequent, this can be enough to swell your eyes shut and can also come along with bruising. This should not hurt and is very expected and normal. It is typically worst at ~ 3 days out from your surgery and dramatically better 1 week post-operatively.     If we did surgery around your nose: No blowing your nose as this puts you at higher risk of popping stitches durign this time. Instead dab under your nose with a tissue or use a Q-tip inside your nose.    MANAGING YOUR PAIN AFTER SURGERY     You can expect to have some pain after surgery. This is normal. The pain is typically worse the first two days after surgery, and quickly begins to get better.     The best strategy for controlling your pain after surgery is around the clock pain control. You can take over the counter Acetaminophen (Tylenol) for discomfort, if no contraindications.     If you are taking this at the maximum dose, you can alternate this with Motrin (ibuprofen or Advil) as well. Alternating these medications with each other allows you to maximize your pain control. In addition to Tylenol and Motrin, you can use heating pads or ice packs on your incisions to help reduce your pain.     How will I alternate your regular strength over-the-counter pain medication?  You will take a dose of pain medication every three hours.   Start by taking 650 mg of Tylenol (2 pills of 325 mg)   3 hours later take 600 mg of Motrin (3 pills of 200 mg)   3 hours after taking the Motrin take 650 mg of Tylenol   3 hours after that take 600 mg of Motrin.    See example - if your first dose of Tylenol is at 12:00 PM     12:00 PM  Tylenol 650 mg (2 pills of 325 mg)    3:00 PM  Motrin 600 mg (3 pills of 200 mg)    6:00 PM  Tylenol 650 mg (2 pills of 325 mg)    9:00 PM  Motrin 600 mg (3 pills of 200 mg)    Continue alternating every 3 hours      Important:   Do not take more than 4000mg of Tylenol or 3200mg of Motrin in a 24-hour period.      What if I still have pain?   If you have pain that is not controlled with the over-the-counter pain medications (Tylenol and Motrin or Advil), don't hesitate to call our staff using the number provided. We will help make sure you are managing your pain in the best way possible, and if necessary, we can provide a prescription for additional pain medication.       CALL OUR OFFICE IMMEDIATELY FOR ANY SIGNS OF INFECTION:    This includes fever, chills, increased redness, warmth, tenderness, severe discomfort/pain, or pus or foul smell coming from the wound. If you are experiencing any of the above, please call [facility] Mohs Department directly at [phone].    IF BLEEDING IS NOTICED:    Place a clean cloth over the area and apply firm pressure for thirty minutes.  Check the wound ONLY after 30 minutes of direct pressure; do not cheat and sneak a peak, as that does not count.  If bleeding persists after 30 minutes of legitimate direct pressure, then try one more round of direct pressure to the area.  Should the bleeding become heavier or not stop after the second attempt, call [facility] directly at [phone]. Your call will get routed to the dermatology surgeon on call even after hours.     Lamine Black (Scribe)

## 2024-12-12 NOTE — PROGRESS NOTES
MOHS Procedure Note    Patient: Phyllis Natarajan  : 1945  MRN: [id number]  Date: 2024    History of Present Illness: The patient is a 79 y.o. female who presents with complaints of Basal cell carcinoma of the right nasal sidewall.     Past Medical History:   Diagnosis Date    Aortic ejection murmur     AS (aortic stenosis)     Atrial fibrillation (HCC)     Basal cell carcinoma 2024    right nasal sidewall, mohs    COPD (chronic obstructive pulmonary disease) (HCC)     Diabetes mellitus (HCC)     Exertional shortness of breath     HTN (hypertension)     Hyperlipidemia     Obesity        Past Surgical History:   Procedure Laterality Date    ADENOIDECTOMY      CARDIAC CATHETERIZATION      HYSTERECTOMY      KNEE SURGERY      MOHS SURGERY Right 2024    BCC right nasal sidewall, Dr Albarran    ROTATOR CUFF REPAIR      TONSILLECTOMY           Current Outpatient Medications:     amLODIPine (NORVASC) 5 mg tablet, Take 1 tablet (5 mg total) by mouth daily, Disp: 30 tablet, Rfl: 2    Biotin 10 MG TABS, Take by mouth, Disp: , Rfl:     carvedilol (COREG) 12.5 mg tablet, Take 1 tablet (12.5 mg total) by mouth 2 (two) times a day with meals, Disp: 30 tablet, Rfl: 2    cetirizine (ZYRTEC ALLERGY) 10 mg tablet, Take 1 tablet by mouth daily, Disp: , Rfl:     Empagliflozin-metFORMIN HCl (Synjardy) 12.5-1000 MG TABS, Take by mouth, Disp: , Rfl:     furosemide (LASIX) 40 mg tablet, Take 1 tablet by mouth daily, Disp: , Rfl:     glipiZIDE-metFORMIN (METAGLIP) 5-500 MG per tablet, Take 1 tablet by mouth 2 (two) times a day before meals, Disp: , Rfl:     Lantus SoloStar 100 units/mL SOPN, Inject 20 Units under the skin daily, Disp: , Rfl:     lisinopril (ZESTRIL) 5 mg tablet, Take 1 tablet (5 mg total) by mouth daily, Disp: 30 tablet, Rfl: 2    multivitamin (THERAGRAN) TABS, Take 1 tablet by mouth daily, Disp: , Rfl:     Omega-3 Fatty Acids (FISH OIL) 645 MG CAPS, Take 1 capsule by mouth daily, Disp: , Rfl:      Omeprazole 20 MG TBEC, Take 1 capsule by mouth 2 (two) times a day, Disp: , Rfl:     potassium chloride (K-DUR,KLOR-CON) 20 mEq tablet, TAKE 1 TABLET BY MOUTH  TWICE DAILY, Disp: 60 tablet, Rfl: 11    rosuvastatin (CRESTOR) 10 MG tablet, Take 0.5 tablets by mouth daily, Disp: , Rfl:     warfarin (COUMADIN) 10 mg tablet, Take 1 tablet by mouth daily, Disp: , Rfl:     enoxaparin (LOVENOX) 100 mg/mL, Inject 1 mL (100 mg total) under the skin every 12 (twelve) hours (Patient not taking: Reported on 12/12/2024), Disp: 14 Syringe, Rfl: 1    glimepiride (AMARYL) 2 mg tablet, Take 1 tablet by mouth daily (Patient not taking: Reported on 12/12/2024), Disp: , Rfl:     metFORMIN (GLUCOPHAGE) 1000 MG tablet, Take 1 tablet by mouth 2 (two) times a day (Patient not taking: Reported on 12/12/2024), Disp: , Rfl:     Allergies   Allergen Reactions    Iodine - Food Allergy     Penicillins     Shellfish-Derived Products - Food Allergy     Sulfa Antibiotics        Physical Exam:   Vitals:    12/12/24 1222   BP: 134/62   Pulse: 76   Temp: 98.2 °F (36.8 °C)   SpO2: 99%     General: Awake, Alert, Oriented x 3, Mood and affect appropriate  Respiratory: Respirations even and unlabored  Cardiovascular: Peripheral pulses intact; no edema  Musculoskeletal Exam: n/a    Skin: 0.6 cm x 0.9 cm eroded macule.     Assessment: Biopsy confirmed basal cell carcinoma nodular type of the right nasal sidewall.     Plan: Mohs    Time of H&P Completion:1313    MOHS Procedure Timeout      Flowsheet Row Most Recent Value   Timeout: 1327   Patient Identity Verified: Yes   Correct Site Verified: Yes   Correct Procedure Verified: Yes            MOHS Diagnosis/Indication/Location/ID      Flowsheet Row Most Recent Value   Pathology Type Basal cell carcinoma   Anatomic Site --  [right nasal sidewall]   Indications for MOHS tumor location   MOHS ID [id number]            MOHS Site/Accession/Pre-Post      Flowsheet Row Most Recent Value   Original Site Identified (as  submitted by referring clinician) Photo, Referral   Biopsy Accession/Specimen # (as submitted by referring clincian) [id number]   Pre-MOHS Size Length (cm) 0.6   Pre-MOHS Size Width (cm) 0.9   Post-MOHS Size-Length (cm) 1.4   Post MOHS Size-Width (cm) 1.5   Repair Type Perialar crescentic advancement flap   Suture Type Monocryl plus, Fast absorbing gut   Fast Absorbing Suture Size 5   Monocryl Plus Suture Size 5   Flap/Graft area (cm2) 14.19   Anesthetic Used 1% Lidocaine with epinephrine  [10 mL]            MOHS Tumor Stage 1 Information      Flowsheet Row Most Recent Value   Tissue Sections (blocks) 2   Microscopic Exam Section 1: Emanating from the epidermis and infiltrating the dermis are irregularly shaped islands of basaloid keratinocytes. The cells have scant cytoplasm and round dark nuclei. Mitotic figures and apoptotic bodies are evident. The nuclei at the periphery of the islands have a palisaded arrangement. The islands are associated with a fibromyxoid stroma and there is cleft formation between some of the islands and stroma. The pathology is consistent with superficial and nodular BCC.     Microscopic Exam Section 2: Emanating from the epidermis and infiltrating the dermis are irregularly shaped islands of basaloid keratinocytes. The cells have scant cytoplasm and round dark nuclei. Mitotic figures and apoptotic bodies are evident. The nuclei at the periphery of the islands have a palisaded arrangement. The islands are associated with a fibromyxoid stroma and there is cleft formation between some of the islands and stroma. The pathology is consistent with superficial and nodular BCC.   Tumor Clear After Stage I? No            MOHS Tumor Stage 2 Information      Flowsheet Row Most Recent Value   Tissue Sections (blocks) 1   Microscopic Exam Section 1: Emanating from the epidermis and infiltrating the dermis are irregularly shaped islands of basaloid keratinocytes. The cells have scant cytoplasm and round  dark nuclei. Mitotic figures and apoptotic bodies are evident. The nuclei at the periphery of the islands have a palisaded arrangement. The islands are associated with a fibromyxoid stroma and there is cleft formation between some of the islands and stroma. The pathology is consistent with nodular BCC.     Tumor Clear After Stage II? No            MOHS Tumor Stage 3 Information      Flowsheet Row Most Recent Value   Tissue Sections (blocks) 1   Microscopic Exam Section 1: No tumor identified in section.   Tumor Clear After Stage III? Yes                    Patient identified, procedure verified, site identified and verified. Time out completed. Surgical removal of the lesion discussed with the patient (risks and benefits, including possibility of scarring, infection, recurrence or potential for further treatment)  I have specifically identified the site with the patient. I have discussed the fact that the patient will have a scar after the procedure regardless of granulation or repair with sutures. I have discussed that the repair options can range from granulation in some cases to linear or curvilinear closures to larger flaps or grafts.  There are sometimes flaps or grafts used that require multiples stages of surgery and will not be completed today, rather be completed over a series of appointments. I have discussed that occasionally due to location, size or depth of the lesion I may recommend consultation with and transfer of care for further removal or the reconstruction to another provider such as ophthalmology surgery, plastic surgery, ENT surgery, or surgical oncology. There are cases in which other testing such as imaging with MRI or CT scan or testing of lymph nodes is recommended because of the nature/depth/location of tumor seen during the removal. There is a risk of injury to nerves causing temporary or permanent numbness or the inability to move muscles full such as the inability to lift eyebrows.  Questions answered and verbal and written consent was obtained.    The tumor qualifies for Mohs based on AUC criteria. Dr. Albarran served as the surgeon and pathologist during the procedure.    With the patient in the supine position and under adequate local anesthesia with 1% lidocaine with epinephrine 1:100,000, the defect was scrubbed with Chlorhexidine. Sterile drapes were placed from the sterile tray.      Because of the location of the surgical defect,  a crescentic advancement flap was judged to give the best possible cosmetic and functional result.  The edges of the defect were carefully debrided removing any dead or coagulated tissue.  The edges of the defect and the area to be used for advancement of tissue at the base of the flap were minimally undermined.  The advancement flap was freed up and draped over the defect. The flap was secured in place by a dermal layer of sutures and the cutaneous margins were approximated and closed by superficial sutures, as noted above.  The standing cones of tissue at the end of the excision were excised with a #15 scalpel blade and closed in two layers as described previously.      The patient tolerated the procedure well. The wound was dressed with petrolatum, a non-stick pad, and a compression dressing.     Swetha Albarran MD served as the surgeon and pathologist during the procedure.    Postoperative care: Wound care discussed at length.  I urged the patient to call us if any problems or question should arise.     Complications: none  Post-op medications: none  Patient condition after procedure: stable  Discharge plans: Plan for follow up as planned with general dermatologist for skin checks or sooner if needed for healing surgical site.       Scribe Attestation      I,:  Nina Barajas MA am acting as a scribe while in the presence of the attending physician.:       I,:  Swetha Albarran MD personally performed the services described in this documentation    as  scribed in my presence.:

## 2025-01-17 LAB — INR PPP: 2.5

## 2025-01-17 RX ORDER — WARFARIN 2.5 MG/1
2.5 TABLET ORAL
Qty: 90 | Refills: 1 | Status: ACTIVE | COMMUNITY
Start: 2025-01-17 | End: 1900-01-01

## 2025-01-27 LAB — INR PPP: 2.6

## 2025-01-30 ENCOUNTER — APPOINTMENT (OUTPATIENT)
Dept: FAMILY MEDICINE | Facility: CLINIC | Age: 80
End: 2025-01-30
Payer: MEDICARE

## 2025-01-30 VITALS
TEMPERATURE: 97 F | RESPIRATION RATE: 16 BRPM | BODY MASS INDEX: 41.91 KG/M2 | DIASTOLIC BLOOD PRESSURE: 72 MMHG | HEART RATE: 80 BPM | WEIGHT: 222 LBS | HEIGHT: 61 IN | OXYGEN SATURATION: 98 % | SYSTOLIC BLOOD PRESSURE: 134 MMHG

## 2025-01-30 DIAGNOSIS — E55.9 VITAMIN D DEFICIENCY, UNSPECIFIED: ICD-10-CM

## 2025-01-30 DIAGNOSIS — I48.92 UNSPECIFIED ATRIAL FLUTTER: ICD-10-CM

## 2025-01-30 DIAGNOSIS — I10 ESSENTIAL (PRIMARY) HYPERTENSION: ICD-10-CM

## 2025-01-30 DIAGNOSIS — E11.9 TYPE 2 DIABETES MELLITUS W/OUT COMPLICATIONS: ICD-10-CM

## 2025-01-30 DIAGNOSIS — Z79.01 LONG TERM (CURRENT) USE OF ANTICOAGULANTS: ICD-10-CM

## 2025-01-30 PROCEDURE — 99214 OFFICE O/P EST MOD 30 MIN: CPT

## 2025-01-30 RX ORDER — DAPAGLIFLOZIN 10 MG/1
TABLET, FILM COATED ORAL
Refills: 0 | Status: ACTIVE | COMMUNITY

## 2025-01-30 RX ORDER — DULAGLUTIDE 4.5 MG/.5ML
4.5 INJECTION, SOLUTION SUBCUTANEOUS
Refills: 0 | Status: ACTIVE | COMMUNITY

## 2025-01-31 ENCOUNTER — OUTPATIENT (OUTPATIENT)
Dept: OUTPATIENT SERVICES | Facility: HOSPITAL | Age: 80
LOS: 1 days | End: 2025-01-31
Payer: MEDICARE

## 2025-01-31 VITALS
OXYGEN SATURATION: 98 % | SYSTOLIC BLOOD PRESSURE: 133 MMHG | RESPIRATION RATE: 18 BRPM | DIASTOLIC BLOOD PRESSURE: 77 MMHG | TEMPERATURE: 98 F | HEIGHT: 61 IN | HEART RATE: 72 BPM | WEIGHT: 220.9 LBS

## 2025-01-31 DIAGNOSIS — Z90.710 ACQUIRED ABSENCE OF BOTH CERVIX AND UTERUS: Chronic | ICD-10-CM

## 2025-01-31 DIAGNOSIS — Z90.49 ACQUIRED ABSENCE OF OTHER SPECIFIED PARTS OF DIGESTIVE TRACT: Chronic | ICD-10-CM

## 2025-01-31 DIAGNOSIS — C50.919 MALIGNANT NEOPLASM OF UNSPECIFIED SITE OF UNSPECIFIED FEMALE BREAST: ICD-10-CM

## 2025-01-31 DIAGNOSIS — G47.33 OBSTRUCTIVE SLEEP APNEA (ADULT) (PEDIATRIC): ICD-10-CM

## 2025-01-31 DIAGNOSIS — Z90.89 ACQUIRED ABSENCE OF OTHER ORGANS: Chronic | ICD-10-CM

## 2025-01-31 DIAGNOSIS — E11.9 TYPE 2 DIABETES MELLITUS WITHOUT COMPLICATIONS: ICD-10-CM

## 2025-01-31 DIAGNOSIS — Z01.818 ENCOUNTER FOR OTHER PREPROCEDURAL EXAMINATION: ICD-10-CM

## 2025-01-31 DIAGNOSIS — I48.3 TYPICAL ATRIAL FLUTTER: ICD-10-CM

## 2025-01-31 DIAGNOSIS — Z98.890 OTHER SPECIFIED POSTPROCEDURAL STATES: Chronic | ICD-10-CM

## 2025-01-31 DIAGNOSIS — Z87.39 PERSONAL HISTORY OF OTHER DISEASES OF THE MUSCULOSKELETAL SYSTEM AND CONNECTIVE TISSUE: Chronic | ICD-10-CM

## 2025-01-31 DIAGNOSIS — T14.90XA INJURY, UNSPECIFIED, INITIAL ENCOUNTER: Chronic | ICD-10-CM

## 2025-01-31 LAB
ANION GAP SERPL CALC-SCNC: 17 MMOL/L — SIGNIFICANT CHANGE UP (ref 5–17)
BLD GP AB SCN SERPL QL: NEGATIVE — SIGNIFICANT CHANGE UP
BUN SERPL-MCNC: 45 MG/DL — HIGH (ref 7–23)
CALCIUM SERPL-MCNC: 10.4 MG/DL — SIGNIFICANT CHANGE UP (ref 8.4–10.5)
CHLORIDE SERPL-SCNC: 98 MMOL/L — SIGNIFICANT CHANGE UP (ref 96–108)
CO2 SERPL-SCNC: 23 MMOL/L — SIGNIFICANT CHANGE UP (ref 22–31)
CREAT SERPL-MCNC: 1.24 MG/DL — SIGNIFICANT CHANGE UP (ref 0.5–1.3)
EGFR: 45 ML/MIN/1.73M2 — LOW
GLUCOSE SERPL-MCNC: 100 MG/DL — HIGH (ref 70–99)
HCT VFR BLD CALC: 36.4 % — SIGNIFICANT CHANGE UP (ref 34.5–45)
HGB BLD-MCNC: 11.9 G/DL — SIGNIFICANT CHANGE UP (ref 11.5–15.5)
MCHC RBC-ENTMCNC: 29 PG — SIGNIFICANT CHANGE UP (ref 27–34)
MCHC RBC-ENTMCNC: 32.7 G/DL — SIGNIFICANT CHANGE UP (ref 32–36)
MCV RBC AUTO: 88.8 FL — SIGNIFICANT CHANGE UP (ref 80–100)
NRBC # BLD: 0 /100 WBCS — SIGNIFICANT CHANGE UP (ref 0–0)
NRBC BLD-RTO: 0 /100 WBCS — SIGNIFICANT CHANGE UP (ref 0–0)
PLATELET # BLD AUTO: 241 K/UL — SIGNIFICANT CHANGE UP (ref 150–400)
POTASSIUM SERPL-MCNC: 4.4 MMOL/L — SIGNIFICANT CHANGE UP (ref 3.5–5.3)
POTASSIUM SERPL-SCNC: 4.4 MMOL/L — SIGNIFICANT CHANGE UP (ref 3.5–5.3)
RBC # BLD: 4.1 M/UL — SIGNIFICANT CHANGE UP (ref 3.8–5.2)
RBC # FLD: 13.2 % — SIGNIFICANT CHANGE UP (ref 10.3–14.5)
RH IG SCN BLD-IMP: POSITIVE — SIGNIFICANT CHANGE UP
SODIUM SERPL-SCNC: 138 MMOL/L — SIGNIFICANT CHANGE UP (ref 135–145)
WBC # BLD: 7.09 K/UL — SIGNIFICANT CHANGE UP (ref 3.8–10.5)
WBC # FLD AUTO: 7.09 K/UL — SIGNIFICANT CHANGE UP (ref 3.8–10.5)

## 2025-01-31 PROCEDURE — G0463: CPT

## 2025-01-31 PROCEDURE — 86850 RBC ANTIBODY SCREEN: CPT

## 2025-01-31 PROCEDURE — 80048 BASIC METABOLIC PNL TOTAL CA: CPT

## 2025-01-31 PROCEDURE — 83036 HEMOGLOBIN GLYCOSYLATED A1C: CPT

## 2025-01-31 PROCEDURE — 85027 COMPLETE CBC AUTOMATED: CPT

## 2025-01-31 PROCEDURE — 86901 BLOOD TYPING SEROLOGIC RH(D): CPT

## 2025-01-31 PROCEDURE — 86900 BLOOD TYPING SEROLOGIC ABO: CPT

## 2025-01-31 NOTE — H&P PST ADULT - NS MD HP INPLANTS MED DEV
Lens implant Cardiac stent x 3, Right shoulder hardware, Right knee replacement/Artificial joint/Lens implant

## 2025-01-31 NOTE — H&P PST ADULT - PRIMARY CARE PROVIDER
, [facility] [phone] , [facility] [phone] 1/30/25

## 2025-01-31 NOTE — H&P PST ADULT - TEMPERATURE IN CELSIUS (DEGREES C)
Facility/Department: [address]  Speech Language Pathology   Dysphagia and Speech Language/Cognitive Treatment Note    Patient: Kat Lopez   :    MRN: [id number]      Evaluation Date: 10/25/2022      Admitting Dx: TIA (transient ischemic attack) [G45.9]  Dizziness [R42]  Treatment Diagnosis: Expressive Aphasia , Cognitive-Linguistic Deficits , Speech Language Deficits   Pain: Denies                                                Subjective:  Pt alert and sitting upright in bed. Pt with good participation in therapy session. Dysphagia Treatment:   Diet and Treatment Recommendations 10/25/2022:  Diet Solids Recommendation:  Regular texture diet  Liquid Consistency Recommendation: Thin liquids  Recommended form of Meds: Meds whole with water or Meds in puree      Compensatory strategies: Upright as possible with all PO intake , Small bites/sips , Swallow 2 times per bite     Assessment of Texture Tolerance:  Diet level prior to treatment: Regular texture diet , Thin liquids   Tolerance of Current Diet Level:Per chart, no noted difficulty with current diet level      -Impressions: Pt was positioned Upright in bed , awake and alert. Currently on room air. Trials of thin liquids and regular solids  were provided to assess swallow function. Pt demonstrated minimally prolonged however functional mastication of regular solids. Good oral clearing achieved. Thin liquids via straw revealed no overt clinical s/s of aspiration in isolation or in combination with regular solids. Based on today's assessment continue to recommend Regular texture diet  with Thin liquids , Meds whole with water or Meds in puree  with use of compensatory swallow strategies (see above). Will follow up x1 to ensure diet tolerance.     Dysphagia Goals:  Pt will functionally tolerate recommended diet with no overt clinical s/s of aspiration (Ongoing 10/25/22)  Pt will demonstrate understanding of aspiration risk and 36.5

## 2025-01-31 NOTE — H&P PST ADULT - ASSESSMENT
DASI score:6.76  DASI activity: Cooking, cleaning, chores. Can walk up 3 flights of stairs in a day without cardiac symptoms. Ambulates with cane.   Loose teeth or denture: denies

## 2025-01-31 NOTE — H&P PST ADULT - HEMATOLOGY/LYMPHATICS COMMENTS
Factor V leiden, Hx LLE DVT (currently on coumadin) Factor V leiden, Hx LLE DVT;  Currently on plavix and coumadin

## 2025-01-31 NOTE — H&P PST ADULT - PROBLEM/PLAN-3
Physical Therapy Daily Treatment Note    Date: 2024  Patient Name: Mark Owusu  : 1948   MRN: [id number]  DOInjury: chronic  DOSx: --    Referring Provider:   Avelino Briones  [place] [address]      Medical Diagnosis:    Diagnosis Orders   1. Arthritis        2. DDD (degenerative disc disease), lumbosacral        3. Weakness        4. At risk for falls            Apollo demonstrates weakness, altered gait, loss of balance, and decreased endurance. PT will consist of strengthening, gait training, balance training. Functional training will be important as well to work on access to the 2nd floor of his home.     Functional Mobility/Tests:  Test     Basic Transfer Fair.   Sit/Stand See 30-Sec. Chair Stand Test below   Squat depth and control is limited.   Double stance, feet together, eyes open Good.   Double stance, feet together, eyes closed Good.   360 degree turns Fair.   Step stool touches Unable.      TUG Test:  _19_  Seconds.  Test date: 2024   Notes: Uses no device. Short, frequent steps with low step height.  Moves toward left on return, self corrected.      An older adult who takes ?12 seconds to complete the TUG is at high risk for falling.      30-Sec. Chair Stand Test:  Number:  _3_  Test date: 2024     Notes:  Two false starts.  Lost form and control quickly.  Test stopped.      X = TO BE PERFORMED NEXT VISIT  > = PROGRESS TO THIS    S: Patient reports no changes.  O:    Time  5024-3801      Visit  - Repeat outcome measure at mid point and end.    Pain    Pain with prolonged weightbearing     Exercise  Goal of strengthening will be to improve gait mechanics and to climb stairs with confidence.       Step-ups - FWD  7\" x 15 reps each   Light hand hold  TA   Step-ups - BWD  >  TA   Step up and over reciprocally  >  TA   Nustep L5 x 10 min     Leg Press 1-leg 20# 2 x 15 reps  L and R  TE   CR - 2 x 10 Unable to perform eccentrically; will return to when 
DISPLAY PLAN FREE TEXT

## 2025-01-31 NOTE — H&P PST ADULT - PROBLEM SELECTOR PLAN 1
Scheduled for Aflutter ablation on 2/14/25 with Dr. Gaspar  Pre-operative instructions  given.  Labs: cbc, bmp, T/S,A1c drawn in PST  DOS: Fingerstick, PT/INR    No inturruption in Anticoagulation as directed by surgeon team. Will remain on Plavix and warfarin until procedure. Scheduled for Aflutter ablation on 2/14/25 with Dr. Gaspar  Pre-operative instructions  given.  Labs: cbc, bmp, T/S,A1c drawn in PST  DOS: Fingerstick, PT/INR    No interruption in Anticoagulation as directed by surgeon team. Will remain on Plavix and warfarin until procedure.

## 2025-01-31 NOTE — H&P PST ADULT - MUSCULOSKELETAL COMMENTS
back pain chronic lower back pain and Right shoulder pain (hx old shoulder injury) Chronic back, shoulder, and Right knee pain; ambulates with cane

## 2025-01-31 NOTE — H&P PST ADULT - HISTORY OF PRESENT ILLNESS
79 year old female with PMH atrial flutter, status post cardioversion (8/24), coronary artery disease, status post coronary intervention, aortic stenosis, hypertension, factor V Leiden, chronically anticoagulated.     In November, 2024  reported feeling slightly dizzy when she bends down but has no feelings of syncope or near syncope.  Symptoms ongoing presenting to PST prior to scheduled Aflutter Ablation on 2/14/25 with Dr. Gaspar.     No chest pain or chest pressure. No shortness of breath. No dyspnea on exertion. No palpitations. No edema. No orthopnea. Denies any excessive ecchymosis, bleeding,black or bloody stools, hematuria or epistaxis.        79 year old female with PMH atrial flutter, status post cardioversion (8/24), coronary artery disease, status post coronary intervention, aortic stenosis, hypertension, factor V Leiden, chronically anticoagulated.     In November, 2024  reported feeling slightly dizzy when she bends down but has no feelings of syncope or near syncope.  Symptoms ongoing presenting to PST prior to scheduled Aflutter Ablation on 2/14/25 with Dr. Gaspar.     No chest pain or chest pressure. No shortness of breath. No dyspnea on exertion. No palpitations. No edema. No orthopnea. Denies any excessive ecchymosis, bleeding, black or bloody stools, hematuria or epistaxis.        79 year old female with PMH atrial flutter, status post cardioversion (8/24), coronary artery disease, status post coronary intervention, aortic stenosis, hypertension, factor V Leiden, chronically anticoagulated (on plavix and coumadin).   In November, 2024  reported feeling slightly dizzy when she bends down but has no feelings of syncope or near syncope.    Today she  endorses "feeling well overall" occasionally dizziness  No chest pain or chest pressure. No shortness of breath. No dyspnea on exertion. No palpitations. No edema. No orthopnea. Presenting to PST prior to scheduled Aflutter Ablation on 2/14/25 with Dr. Gaspar.   Denies any excessive ecchymosis, bleeding, black or bloody stools, hematuria or epistaxis.        79 year old female with PMH atrial flutter, status post cardioversion (8/24), coronary artery disease, status post coronary intervention, aortic stenosis, hypertension, factor V Leiden (Dx in early 20's does not have hematologist),  chronically anticoagulated-on plavix and coumadin.   In November, 2024  reported feeling slightly dizzy when she bends down but has no feelings of syncope or near syncope.    Today she  endorses "feeling well overall" occasionally dizziness  No chest pain or chest pressure. No shortness of breath. No dyspnea on exertion. No palpitations. No edema. No orthopnea. Presenting to PST prior to scheduled Aflutter Ablation on 2/14/25 with Dr. Gaspar.   Denies any excessive ecchymosis, bleeding, black or bloody stools, hematuria or epistaxis.

## 2025-02-01 LAB
A1C WITH ESTIMATED AVERAGE GLUCOSE RESULT: 7.2 % — HIGH (ref 4–5.6)
ESTIMATED AVERAGE GLUCOSE: 160 MG/DL — HIGH (ref 68–114)

## 2025-02-12 ENCOUNTER — TRANSCRIPTION ENCOUNTER (OUTPATIENT)
Age: 80
End: 2025-02-12

## 2025-02-12 LAB — INR PPP: 3.2

## 2025-02-25 ENCOUNTER — APPOINTMENT (OUTPATIENT)
Dept: CARDIOLOGY | Facility: CLINIC | Age: 80
End: 2025-02-25

## 2025-03-05 LAB — INR PPP: 2.8

## 2025-03-21 NOTE — ED ADULT NURSE NOTE - NSSEPSISNEWALTERMENTAL_ED_A_ED
Dieta líquida kaylyn, pasando gradualmente a alimentos blandos según la tolerancia.  [place] ibuprofeno o paracetamol según sea necesario para la fiebre.  Consulte con meyers médico de cabecera en 2-3 días.  Regrese a urgencias ante cualquier síntoma nuevo o que empeore.    Náuseas/Vómitos    Las náuseas son la sensación de ganas de vomitar. A medida que las náuseas empeoran, pueden provocar vómitos. El vómito aumenta el riesgo de deshidratación. Los adultos mayores y las personas con otras enfermedades o un sistema inmunitario debilitado tienen mayor riesgo de deshidratación. Grace líquidos demi en cantidades pequeñas maribeth frecuentes, según la tolerancia. Coma alimentos blandos y fáciles de digerir, en pequeñas cantidades, según la tolerancia.    BUSQUE ATENCIÓN MÉDICA INMEDIATA SI TIENE ALGUNO DE LOS SIGUIENTES SÍNTOMAS: fiebre, incapacidad para retener suficientes líquidos, vómitos negros o con marah, heces negras o con marah, mareos, dolor en el pecho, dolor de paul intenso, sarpullido, dificultad para respirar, piel fría o húmeda, confusión, dolor al orinar o cualquier signo de deshidratación.
No

## 2025-03-26 LAB — INR PPP: 2.6

## 2025-04-01 ENCOUNTER — APPOINTMENT (OUTPATIENT)
Dept: ELECTROPHYSIOLOGY | Facility: CLINIC | Age: 80
End: 2025-04-01

## 2025-04-07 LAB — INR PPP: 2.4

## 2025-04-14 ENCOUNTER — NON-APPOINTMENT (OUTPATIENT)
Age: 80
End: 2025-04-14

## 2025-04-14 ENCOUNTER — APPOINTMENT (OUTPATIENT)
Dept: CARDIOLOGY | Facility: CLINIC | Age: 80
End: 2025-04-14
Payer: MEDICARE

## 2025-04-14 VITALS
HEIGHT: 61 IN | DIASTOLIC BLOOD PRESSURE: 71 MMHG | BODY MASS INDEX: 41.91 KG/M2 | HEART RATE: 78 BPM | RESPIRATION RATE: 18 BRPM | OXYGEN SATURATION: 98 % | SYSTOLIC BLOOD PRESSURE: 116 MMHG | WEIGHT: 222 LBS

## 2025-04-14 DIAGNOSIS — I25.10 ATHEROSCLEROTIC HEART DISEASE OF NATIVE CORONARY ARTERY W/OUT ANGINA PECTORIS: ICD-10-CM

## 2025-04-14 DIAGNOSIS — Z79.01 LONG TERM (CURRENT) USE OF ANTICOAGULANTS: ICD-10-CM

## 2025-04-14 DIAGNOSIS — I48.92 UNSPECIFIED ATRIAL FLUTTER: ICD-10-CM

## 2025-04-14 DIAGNOSIS — I35.0 NONRHEUMATIC AORTIC (VALVE) STENOSIS: ICD-10-CM

## 2025-04-14 DIAGNOSIS — D68.51 ACTIVATED PROTEIN C RESISTANCE: ICD-10-CM

## 2025-04-14 DIAGNOSIS — I10 ESSENTIAL (PRIMARY) HYPERTENSION: ICD-10-CM

## 2025-04-14 PROCEDURE — G2211 COMPLEX E/M VISIT ADD ON: CPT

## 2025-04-14 PROCEDURE — 93000 ELECTROCARDIOGRAM COMPLETE: CPT

## 2025-04-14 PROCEDURE — 99215 OFFICE O/P EST HI 40 MIN: CPT

## 2025-04-22 LAB — INR PPP: 2.2

## 2025-05-12 ENCOUNTER — APPOINTMENT (OUTPATIENT)
Dept: FAMILY MEDICINE | Facility: CLINIC | Age: 80
End: 2025-05-12
Payer: MEDICARE

## 2025-05-12 VITALS
DIASTOLIC BLOOD PRESSURE: 73 MMHG | HEIGHT: 61 IN | OXYGEN SATURATION: 97 % | BODY MASS INDEX: 42.29 KG/M2 | HEART RATE: 81 BPM | RESPIRATION RATE: 16 BRPM | SYSTOLIC BLOOD PRESSURE: 122 MMHG | WEIGHT: 224 LBS | TEMPERATURE: 98.1 F

## 2025-05-12 DIAGNOSIS — I48.92 UNSPECIFIED ATRIAL FLUTTER: ICD-10-CM

## 2025-05-12 DIAGNOSIS — I35.0 NONRHEUMATIC AORTIC (VALVE) STENOSIS: ICD-10-CM

## 2025-05-12 DIAGNOSIS — I10 ESSENTIAL (PRIMARY) HYPERTENSION: ICD-10-CM

## 2025-05-12 DIAGNOSIS — M25.562 PAIN IN RIGHT KNEE: ICD-10-CM

## 2025-05-12 DIAGNOSIS — I25.10 ATHEROSCLEROTIC HEART DISEASE OF NATIVE CORONARY ARTERY W/OUT ANGINA PECTORIS: ICD-10-CM

## 2025-05-12 DIAGNOSIS — E11.9 TYPE 2 DIABETES MELLITUS W/OUT COMPLICATIONS: ICD-10-CM

## 2025-05-12 DIAGNOSIS — Z79.01 LONG TERM (CURRENT) USE OF ANTICOAGULANTS: ICD-10-CM

## 2025-05-12 DIAGNOSIS — K21.9 GASTRO-ESOPHAGEAL REFLUX DISEASE W/OUT ESOPHAGITIS: ICD-10-CM

## 2025-05-12 DIAGNOSIS — M25.561 PAIN IN RIGHT KNEE: ICD-10-CM

## 2025-05-12 DIAGNOSIS — M25.562 PAIN IN LEFT KNEE: ICD-10-CM

## 2025-05-12 PROCEDURE — 99204 OFFICE O/P NEW MOD 45 MIN: CPT

## 2025-05-12 RX ORDER — DICLOFENAC SODIUM 10 MG/G
1 GEL TOPICAL
Qty: 100 | Refills: 3 | Status: ACTIVE | COMMUNITY
Start: 2025-05-12 | End: 1900-01-01

## 2025-05-12 RX ORDER — TRAMADOL HYDROCHLORIDE 50 MG/1
50 TABLET, COATED ORAL
Qty: 20 | Refills: 0 | Status: ACTIVE | COMMUNITY
Start: 2025-05-12 | End: 1900-01-01

## 2025-05-13 ENCOUNTER — OUTPATIENT (OUTPATIENT)
Dept: OUTPATIENT SERVICES | Facility: HOSPITAL | Age: 80
LOS: 1 days | End: 2025-05-13
Payer: MEDICARE

## 2025-05-13 DIAGNOSIS — T14.90XA INJURY, UNSPECIFIED, INITIAL ENCOUNTER: Chronic | ICD-10-CM

## 2025-05-13 DIAGNOSIS — Z90.49 ACQUIRED ABSENCE OF OTHER SPECIFIED PARTS OF DIGESTIVE TRACT: Chronic | ICD-10-CM

## 2025-05-13 DIAGNOSIS — Z98.890 OTHER SPECIFIED POSTPROCEDURAL STATES: Chronic | ICD-10-CM

## 2025-05-13 DIAGNOSIS — Z90.710 ACQUIRED ABSENCE OF BOTH CERVIX AND UTERUS: Chronic | ICD-10-CM

## 2025-05-13 DIAGNOSIS — Z87.39 PERSONAL HISTORY OF OTHER DISEASES OF THE MUSCULOSKELETAL SYSTEM AND CONNECTIVE TISSUE: Chronic | ICD-10-CM

## 2025-05-13 DIAGNOSIS — Z90.89 ACQUIRED ABSENCE OF OTHER ORGANS: Chronic | ICD-10-CM

## 2025-05-13 DIAGNOSIS — M25.562 PAIN IN LEFT KNEE: ICD-10-CM

## 2025-05-13 LAB — INR PPP: 2.3

## 2025-05-13 PROCEDURE — 93971 EXTREMITY STUDY: CPT

## 2025-05-13 PROCEDURE — 93971 EXTREMITY STUDY: CPT | Mod: 26,LT

## 2025-05-29 ENCOUNTER — APPOINTMENT (OUTPATIENT)
Dept: FAMILY MEDICINE | Facility: CLINIC | Age: 80
End: 2025-05-29

## 2025-06-06 LAB — INR PPP: 2.1

## 2025-06-10 ENCOUNTER — EMERGENCY (EMERGENCY)
Facility: HOSPITAL | Age: 80
LOS: 1 days | End: 2025-06-10
Attending: EMERGENCY MEDICINE | Admitting: HOSPITALIST
Payer: MEDICARE

## 2025-06-10 VITALS
OXYGEN SATURATION: 97 % | WEIGHT: 216.93 LBS | HEART RATE: 71 BPM | TEMPERATURE: 98 F | HEIGHT: 62 IN | DIASTOLIC BLOOD PRESSURE: 64 MMHG | RESPIRATION RATE: 19 BRPM | SYSTOLIC BLOOD PRESSURE: 96 MMHG

## 2025-06-10 DIAGNOSIS — T14.90XA INJURY, UNSPECIFIED, INITIAL ENCOUNTER: Chronic | ICD-10-CM

## 2025-06-10 DIAGNOSIS — Z90.89 ACQUIRED ABSENCE OF OTHER ORGANS: Chronic | ICD-10-CM

## 2025-06-10 DIAGNOSIS — Z98.890 OTHER SPECIFIED POSTPROCEDURAL STATES: Chronic | ICD-10-CM

## 2025-06-10 DIAGNOSIS — Z87.39 PERSONAL HISTORY OF OTHER DISEASES OF THE MUSCULOSKELETAL SYSTEM AND CONNECTIVE TISSUE: Chronic | ICD-10-CM

## 2025-06-10 DIAGNOSIS — Z90.710 ACQUIRED ABSENCE OF BOTH CERVIX AND UTERUS: Chronic | ICD-10-CM

## 2025-06-10 DIAGNOSIS — Z90.49 ACQUIRED ABSENCE OF OTHER SPECIFIED PARTS OF DIGESTIVE TRACT: Chronic | ICD-10-CM

## 2025-06-10 LAB
ALBUMIN SERPL ELPH-MCNC: 4.1 G/DL — SIGNIFICANT CHANGE UP (ref 3.3–5)
ALP SERPL-CCNC: 64 U/L — SIGNIFICANT CHANGE UP (ref 40–120)
ALT FLD-CCNC: 63 U/L — HIGH (ref 10–45)
ANION GAP SERPL CALC-SCNC: 14 MMOL/L — SIGNIFICANT CHANGE UP (ref 5–17)
AST SERPL-CCNC: 134 U/L — HIGH (ref 10–40)
BASOPHILS # BLD AUTO: 0.07 K/UL — SIGNIFICANT CHANGE UP (ref 0–0.2)
BASOPHILS NFR BLD AUTO: 0.9 % — SIGNIFICANT CHANGE UP (ref 0–2)
BILIRUB SERPL-MCNC: 0.8 MG/DL — SIGNIFICANT CHANGE UP (ref 0.2–1.2)
BUN SERPL-MCNC: 44 MG/DL — HIGH (ref 7–23)
CALCIUM SERPL-MCNC: 9.6 MG/DL — SIGNIFICANT CHANGE UP (ref 8.4–10.5)
CHLORIDE SERPL-SCNC: 102 MMOL/L — SIGNIFICANT CHANGE UP (ref 96–108)
CK MB CFR SERPL CALC: 3.3 NG/ML — SIGNIFICANT CHANGE UP (ref 0–3.8)
CO2 SERPL-SCNC: 23 MMOL/L — SIGNIFICANT CHANGE UP (ref 22–31)
CREAT SERPL-MCNC: 1.3 MG/DL — SIGNIFICANT CHANGE UP (ref 0.5–1.3)
EGFR: 42 ML/MIN/1.73M2 — LOW
EGFR: 42 ML/MIN/1.73M2 — LOW
EOSINOPHIL # BLD AUTO: 0.15 K/UL — SIGNIFICANT CHANGE UP (ref 0–0.5)
EOSINOPHIL NFR BLD AUTO: 1.9 % — SIGNIFICANT CHANGE UP (ref 0–6)
GLUCOSE SERPL-MCNC: 128 MG/DL — HIGH (ref 70–99)
HCT VFR BLD CALC: 34.6 % — SIGNIFICANT CHANGE UP (ref 34.5–45)
HGB BLD-MCNC: 11.2 G/DL — LOW (ref 11.5–15.5)
IMM GRANULOCYTES NFR BLD AUTO: 0.4 % — SIGNIFICANT CHANGE UP (ref 0–0.9)
LYMPHOCYTES # BLD AUTO: 1.9 K/UL — SIGNIFICANT CHANGE UP (ref 1–3.3)
LYMPHOCYTES # BLD AUTO: 24.1 % — SIGNIFICANT CHANGE UP (ref 13–44)
MCHC RBC-ENTMCNC: 29 PG — SIGNIFICANT CHANGE UP (ref 27–34)
MCHC RBC-ENTMCNC: 32.4 G/DL — SIGNIFICANT CHANGE UP (ref 32–36)
MCV RBC AUTO: 89.6 FL — SIGNIFICANT CHANGE UP (ref 80–100)
MONOCYTES # BLD AUTO: 0.82 K/UL — SIGNIFICANT CHANGE UP (ref 0–0.9)
MONOCYTES NFR BLD AUTO: 10.4 % — SIGNIFICANT CHANGE UP (ref 2–14)
NEUTROPHILS # BLD AUTO: 4.93 K/UL — SIGNIFICANT CHANGE UP (ref 1.8–7.4)
NEUTROPHILS NFR BLD AUTO: 62.3 % — SIGNIFICANT CHANGE UP (ref 43–77)
NRBC BLD AUTO-RTO: 0 /100 WBCS — SIGNIFICANT CHANGE UP (ref 0–0)
PLATELET # BLD AUTO: 200 K/UL — SIGNIFICANT CHANGE UP (ref 150–400)
POTASSIUM SERPL-MCNC: 4.7 MMOL/L — SIGNIFICANT CHANGE UP (ref 3.5–5.3)
POTASSIUM SERPL-SCNC: 4.7 MMOL/L — SIGNIFICANT CHANGE UP (ref 3.5–5.3)
PROT SERPL-MCNC: 7.3 G/DL — SIGNIFICANT CHANGE UP (ref 6–8.3)
RBC # BLD: 3.86 M/UL — SIGNIFICANT CHANGE UP (ref 3.8–5.2)
RBC # FLD: 12.7 % — SIGNIFICANT CHANGE UP (ref 10.3–14.5)
SODIUM SERPL-SCNC: 139 MMOL/L — SIGNIFICANT CHANGE UP (ref 135–145)
TROPONIN T, HIGH SENSITIVITY RESULT: 55 NG/L — HIGH (ref 0–51)
WBC # BLD: 7.9 K/UL — SIGNIFICANT CHANGE UP (ref 3.8–10.5)
WBC # FLD AUTO: 7.9 K/UL — SIGNIFICANT CHANGE UP (ref 3.8–10.5)

## 2025-06-10 PROCEDURE — 71045 X-RAY EXAM CHEST 1 VIEW: CPT | Mod: 26

## 2025-06-10 NOTE — ED ADULT TRIAGE NOTE - BMI (KG/M2)
Worsening leukocytosis, abdominal pain, fatigue, and JOVANI on 11/18.  UA+ for nitrites, leukocytes, and blood.  Urine culture pending.  Started on doxycycline 100mg BID.   39.7

## 2025-06-10 NOTE — ED PROVIDER NOTE - PROGRESS NOTE DETAILS
Erick PGY3: Labs remarkable for troponin 55–50, AST//63.  She reports that the pain has improved.  She takes warfarin for factor V Leiden and is regularly compliant with it.     Cardiologist: Dr. Levi Larson   EP: Dr. Ольга Gaspar    Past cardiology consulted for need for inpatient cardiology evaluation as patient is endorsing the chest discomfort is similar to previous ACS episode. Erick PGY3: House cardiology consulted.  Preliminary recs with low index of suspicion for ACS.  Recommending TTE versus outpatient ischemic evaluation.  On reassessment, patient still endorsing slight chest discomfort.  Since patient had endorsed that chest pain was similar to her previous heart attacks despite occurring after ingesting clindamycin, will obtain echocardiogram and additional inpatient ischemic workup as warranted.  Patient amenable with plan.

## 2025-06-10 NOTE — ED PROVIDER NOTE - CLINICAL SUMMARY MEDICAL DECISION MAKING FREE TEXT BOX
75 yo F w/ PMHx of CAD s/p 3 stents (20-25 years ago, and 3 years ago), factor V Leiden on warfarin, dental extraction with bone implant today with Novocain on clindamycin, a flutter s/p cardioversion (1 years ago) presents for episode of severe indigestion/chest discomfort ~7 PM today.  Patient went home after oral surgery and took clindamycin.  Instructions state to not lay flat while taking clindamycin and drink with previous water and food.  However, patient did not do this and started having chest discomfort and indigestion ~7 PM shortly after taking it.  She also took some acetaminophen–codeine. She described the discomfort as constant burning sensation.  She states that the chest discomfort is similar to her previous heart attack.  Last ultrasound was 1-2 months ago with Dr. Cota which was normal.  He has a history of a flutter that is being evaluated by Dr. Gaspar for embolization plan.  She denies any fever/chills/cough/sore throat, headache/vision changes, exertional chest pain, trouble breathing, vomiting/diarrhea.  Her grandchildren are sick at home, but she has no symptoms.  Her chest discomfort is resolving during ED presentation.  Patient was previously on pantoprazole twice daily, but is only on pantoprazole once daily now.    Vital signs remarkable for /65.  EKG shows sinus rhythm with first-degree AV block () and no ischemic changes meeting Sgarbossa's criteria.    GENERAL: no acute distress, endomorphic body habitus  HEENT: atraumatic, normocephalic, vision grossly intact, EOMI, no conjunctivitis or discharge, hearing grossly intact, no nasal discharge or epistaxis, clear pharynx  CV: Systolic murmur, regular rate, normal rhythm, normal S1/S2,  no cyanosis  PULM: normal work of breathing, normal O2 saturation on RA, clear breath sounds in b/l upper/lower lung fields, no crackles/rales/rhonchi/wheezing  GI: soft/non-tender/nondistended abdomen, no guarding or rebound tenderness, no palpable masses  : no CVA tenderness  NEURO: A&Ox4, follows commands, normal speech, no focal motor or sensory deficits  MSK: no joint tenderness/swelling/erythema, ranging all extremities with no appreciable loss of ROM  EXT: no peripheral edema, no calf tenderness, no redness or swelling  SKIN: warm, dry, and intact, no rashes  PSYCH: appropriate mood and affect    Concern for ACS versus esophageal/stomach irritation secondary to taking clindamycin under suboptimal circumstances.  No concern for ACS as patient has clear onset event.  Plan for ACS workup and symptomatic treatment with famotidine.

## 2025-06-10 NOTE — ED PROVIDER NOTE - CADM POA CENTRAL LINE
9/4/2020 at 0825 a.m. right chest implanted port accessed using a 20 gauge 3/4 inch non-coring needle primed with 0.9% sodium chloride.  Good blood return obtained.  Blood obtained and sent to lab. Flushed with 20ml 0.9% sodium chloride. Implanted port site is not swollen and not warm.  Patient tolerated procedure well. Pt discharged to RN.     No

## 2025-06-10 NOTE — ED PROVIDER NOTE - ATTENDING CONTRIBUTION TO CARE
Attending MD Art:  I have seen and examined this patient and fully participated in the care of this patient as the teaching attending. I personally made/approved the management plan and take responsibility for the patient management.      The patient presented to the emergency department with chest pain that began at approximately 7:30 PM today. She describes the pain as feeling like "severe indigestion" located centrally in her chest. The pain does not radiate to her shoulders or back. She reports that it feels like she "could either pass gas or make a big burp and feel happy." The pain intensity was initially higher but has decreased to a 5/10 at the time of examination. She denies nausea, vomiting, dizziness, shortness of breath, or abdominal pain. The patient had oral surgery earlier today with a bone implant and bone graft in her upper jaw. She received Novocaine during the procedure. Post-procedure, she was prescribed acetaminophen with codeine and clindamycin. She took clindamycin without following the instructions to remain upright for 30 minutes afterward. She also took her prescribed pantoprazole which did not alleviate the chest discomfort. The patient reports that this pain feels similar to a previous heart attack she experienced approximately 5-30 years ago. She has a significant cardiac history including previous stents that were later replaced due to age.    Primary Care Doctor: Dr. Levi Faustin    Cardiologist: Dr. Cory Cota (part of Dr. Engle's practice)    Past Medical History (may not be comprehensive): Diabetes, breast cancer, history of myocardial infarction with stent placement, factor V Leiden, hypercholesterolemia, hypertension    Past Surgical History (may not be comprehensive): Knee replacement, shoulder replacement, cholecystectomy, hysterectomy    Allergies: Penicillin, sulfa, iodine (developed rash and respiratory difficulty with IV administration)    Medication History (may not be comprehensive): Farxiga, pantoprazole, carvedilol, lisinopril, clopidogrel (Plavix), warfarin (alternating 5mg and 7.5mg doses for factor V Leiden), amlodipine, rosuvastatin, and various supplements. Recent INR was 2.11.    Social History: Not mentioned    Review of Systems:  - Cardiovascular: Chest pain as described above, denies radiation of pain  - Respiratory: Denies shortness of breath  - Gastrointestinal: Describes chest discomfort as feeling like indigestion, denies abdominal pain  - Neurological: Denies dizziness                Patient's vital signs are notable for blood pressure of 96/64 otherwise nonactionable.  She is sitting in the stretcher in no apparent acute distress.  High BMI breathing comfortably on room air clear lungs anteriorly extremities warm well-perfused distal pulses full and equal bilateral upper and lower extremities.  No facial swelling no buccal fluctuance tongue not elevated no gingival swelling or fluctuance.    ECG recorded at 9:53 PM independently interpreted by me , Dr Tani Art,  at 1054 shows sinus rhythm with first-degree AV block MT of 272 ms left bundle branch block no modified Sgarbossa criteria met on this ECG    Patient is presenting for evaluation of acute chest pain, has a history of CAD with stents, patient's concerned that current symptoms are reminiscent of prior episodes of cardiac ischemia.  Initial EKG is without diagnostic acute ischemic findings, will obtain cardiac biomarkers to assess for ACS.  Symptoms are not consistent with aortic dissection or pulmonary embolism.  Given high risk cardiac history, patient will require admission versus observation for further cardiac testing even if biomarkers are negative.      *The above represents an initial assessment/impression. Please refer to progress notes for potential changes in patient clinical course*

## 2025-06-11 ENCOUNTER — TRANSCRIPTION ENCOUNTER (OUTPATIENT)
Age: 80
End: 2025-06-11

## 2025-06-11 VITALS
TEMPERATURE: 98 F | HEART RATE: 66 BPM | RESPIRATION RATE: 18 BRPM | OXYGEN SATURATION: 96 % | SYSTOLIC BLOOD PRESSURE: 138 MMHG | DIASTOLIC BLOOD PRESSURE: 62 MMHG

## 2025-06-11 DIAGNOSIS — E11.9 TYPE 2 DIABETES MELLITUS WITHOUT COMPLICATIONS: ICD-10-CM

## 2025-06-11 DIAGNOSIS — N17.0 ACUTE KIDNEY FAILURE WITH TUBULAR NECROSIS: ICD-10-CM

## 2025-06-11 DIAGNOSIS — I25.10 ATHEROSCLEROTIC HEART DISEASE OF NATIVE CORONARY ARTERY WITHOUT ANGINA PECTORIS: ICD-10-CM

## 2025-06-11 DIAGNOSIS — I10 ESSENTIAL (PRIMARY) HYPERTENSION: ICD-10-CM

## 2025-06-11 DIAGNOSIS — R07.89 OTHER CHEST PAIN: ICD-10-CM

## 2025-06-11 DIAGNOSIS — R07.9 CHEST PAIN, UNSPECIFIED: ICD-10-CM

## 2025-06-11 DIAGNOSIS — D68.51 ACTIVATED PROTEIN C RESISTANCE: ICD-10-CM

## 2025-06-11 DIAGNOSIS — K21.9 GASTRO-ESOPHAGEAL REFLUX DISEASE WITHOUT ESOPHAGITIS: ICD-10-CM

## 2025-06-11 DIAGNOSIS — I48.92 UNSPECIFIED ATRIAL FLUTTER: ICD-10-CM

## 2025-06-11 LAB
APTT BLD: 37.8 SEC — HIGH (ref 26.1–36.8)
CK MB BLD-MCNC: 2.4 % — SIGNIFICANT CHANGE UP (ref 0–3.5)
CK MB CFR SERPL CALC: 3 NG/ML — SIGNIFICANT CHANGE UP (ref 0–3.8)
CK SERPL-CCNC: 126 U/L — SIGNIFICANT CHANGE UP (ref 25–170)
INR BLD: 2.06 RATIO — HIGH (ref 0.85–1.16)
PROTHROM AB SERPL-ACNC: 23.3 SEC — HIGH (ref 9.9–13.4)

## 2025-06-11 PROCEDURE — 93005 ELECTROCARDIOGRAM TRACING: CPT

## 2025-06-11 PROCEDURE — 85025 COMPLETE CBC W/AUTO DIFF WBC: CPT

## 2025-06-11 PROCEDURE — 85730 THROMBOPLASTIN TIME PARTIAL: CPT

## 2025-06-11 PROCEDURE — 82553 CREATINE MB FRACTION: CPT

## 2025-06-11 PROCEDURE — 82550 ASSAY OF CK (CPK): CPT

## 2025-06-11 PROCEDURE — 84484 ASSAY OF TROPONIN QUANT: CPT

## 2025-06-11 PROCEDURE — 80053 COMPREHEN METABOLIC PANEL: CPT

## 2025-06-11 PROCEDURE — 99285 EMERGENCY DEPT VISIT HI MDM: CPT | Mod: 25

## 2025-06-11 PROCEDURE — 85610 PROTHROMBIN TIME: CPT

## 2025-06-11 PROCEDURE — 71045 X-RAY EXAM CHEST 1 VIEW: CPT

## 2025-06-11 RX ORDER — CLOPIDOGREL BISULFATE 75 MG/1
75 TABLET, FILM COATED ORAL DAILY
Refills: 0 | Status: DISCONTINUED | OUTPATIENT
Start: 2025-06-11 | End: 2025-06-11

## 2025-06-11 RX ORDER — CARVEDILOL 3.12 MG/1
25 TABLET, FILM COATED ORAL EVERY 12 HOURS
Refills: 0 | Status: DISCONTINUED | OUTPATIENT
Start: 2025-06-11 | End: 2025-06-11

## 2025-06-11 RX ORDER — AMLODIPINE BESYLATE 10 MG/1
5 TABLET ORAL DAILY
Refills: 0 | Status: DISCONTINUED | OUTPATIENT
Start: 2025-06-11 | End: 2025-06-11

## 2025-06-11 RX ORDER — GLIPIZIDE/METFORMIN HCL 2.5-250 MG
1 TABLET ORAL
Refills: 0 | DISCHARGE

## 2025-06-11 RX ORDER — ACETAMINOPHEN 500 MG/5ML
650 LIQUID (ML) ORAL EVERY 6 HOURS
Refills: 0 | Status: DISCONTINUED | OUTPATIENT
Start: 2025-06-11 | End: 2025-06-11

## 2025-06-11 RX ORDER — LISINOPRIL 5 MG/1
5 TABLET ORAL DAILY
Refills: 0 | Status: DISCONTINUED | OUTPATIENT
Start: 2025-06-11 | End: 2025-06-11

## 2025-06-11 RX ORDER — ROSUVASTATIN CALCIUM 20 MG/1
20 TABLET, FILM COATED ORAL AT BEDTIME
Refills: 0 | Status: DISCONTINUED | OUTPATIENT
Start: 2025-06-11 | End: 2025-06-11

## 2025-06-11 RX ADMIN — Medication 20 MILLIGRAM(S): at 00:10

## 2025-06-11 NOTE — DISCHARGE NOTE PROVIDER - NSDCCPCAREPLAN_GEN_ALL_CORE_FT
PRINCIPAL DISCHARGE DIAGNOSIS  Diagnosis: Chest pain  Assessment and Plan of Treatment: You were seen by a cardiologist. We believe your symptoms are GI in nature and related to Clindamycin. Please take future pain medications and antibiotics with food. Also please maintain an upright position after taking it. We recommend following up with your cardiologist to determine if further work up is needed. You had a recent echocardiogram. If symptoms recur and/or ongoing please speak to your cardiologist or PCP.      SECONDARY DISCHARGE DIAGNOSES  Diagnosis: CAD (coronary artery disease)  Assessment and Plan of Treatment: Continue with home medications    Diagnosis: GERD (gastroesophageal reflux disease)  Assessment and Plan of Treatment: Continue with home medications    Diagnosis: Factor V Leiden  Assessment and Plan of Treatment: Continue with home medications    Diagnosis: Diabetes  Assessment and Plan of Treatment: Continue with home medications

## 2025-06-11 NOTE — DISCHARGE NOTE PROVIDER - HOSPITAL COURSE
HPI:  76F with PMHx of CAD (stents 20-25 and 3 years ago), Factor V Leiden, atrial flutter, HTN, GERD and T2DM presenting for several hours of chest discomfort after taking codeine and clindamycin. Patient recently had a dental procedure (bone/tooth graft) and that afterwards was having intense pain. She did not eat much on this day. She states when she went home she took Clindamycin and Codeine on an empty stomach and then began to feel an indigestion like sensation midsternum. She states after she took these medications she also laid flat. She denies nausea, vomiting, or shortness of breath. The symptoms evolved over hours and now she feels like burping. She states because of her prior cardiac history she wanted to make sure it was not cardiac in etiology. Denies lightheadedness. In the ED she had lateral troponin which was borderline normal/positive. She was seen by cardiology who recommended either inpatient TTE or outpatient work up. She follows with Levi Larson and had an unremarkable TTE on 11/24. EKG appears unchanged from prior.  (11 Jun 2025 08:34)    Hospital Course:  Patient admitted for further cardiac work up. No events while here. Cardiology consulted and recommended outpatient follow up. Discussed with patient possibly pursuing inpatient TTE, but with shared decision making patient to follow up with her PCP and cardiologist. Given return precautions. Likely symptoms due to clindamycin and codeine. She had recent TTE in 11/2024.

## 2025-06-11 NOTE — H&P ADULT - HISTORY OF PRESENT ILLNESS
76F with PMHx of CAD (stents 20-25 and 3 years ago), Factor V Leiden, HTN, GERD and T2DM presenting for several hours of chest discomfort after taking codeine and clindamycin.      o F w/ PMHx significant for CAD s/p 3 stents (20-25 years ago, and 3 years ago), factor V Leiden on warfarin, recent dental extraction with bone grafting (on day of presentation), AFL s/p cardioversion (1 years ago) who presented to the ED after an episode of severe indigestion/chest discomfort ~7 PM on 6/10. Patient states she went home after oral surgery and took clindamycin.  SHe was given instructions to not lay flat while taking clindamycin and drink with water and food.  However, patient did not do this and started having chest discomfort and indigestion ~7 PM shortly after taking it.  She also took some acetaminophen–codeine. She described the discomfort as constant burning sensation.  She states that the chest discomfort is possibly similar to her previous heart attack, though she cannot remember specifically.  She denies any fever/chills/cough/sore throat, headache/vision changes, exertional chest pain, trouble breathing, vomiting/diarrhea.  Her grandchildren are sick at home, but she has no symptoms.  Her chest discomfort is resolving during ED presentation.  Patient was previously on pantoprazole twice daily, but is only on pantoprazole once daily now.    ED Course: On arrival to the ED AVSS. CBC WNL. Lytes WNL. hsTrop trend noted at 55 --> 50. Patient received famotidine in [facility] ED w/ resolution of sx. Cardiology are consulted given chest pain, transient rise in hsTrops.    76F with PMHx of CAD (stents 20-25 and 3 years ago), Factor V Leiden, atrial flutter, HTN, GERD and T2DM presenting for several hours of chest discomfort after taking codeine and clindamycin. Patient recently had a dental procedure (bone/tooth graft) and that afterwards was having intense pain. She did not eat much on this day. She states when she went home she took Clindamycin and Codeine on an empty stomach and then began to feel an indigestion like sensation midsternum. She states after she took these medications she also laid flat. She denies nausea, vomiting, or shortness of breath. The symptoms evolved over hours and now she feels like burping. She states because of her prior cardiac history she wanted to make sure it was not cardiac in etiology. Denies lightheadedness. In the ED she had lateral troponin which was borderline normal/positive. She was seen by cardiology who recommended either inpatient TTE or outpatient work up. She follows with Levi Larson and had an unremarkable TTE on 11/24. EKG appears unchanged from prior.

## 2025-06-11 NOTE — DISCHARGE NOTE PROVIDER - NSDCMRMEDTOKEN_GEN_ALL_CORE_FT
amLODIPine 5 mg oral tablet: 1 tab(s) orally once a day  B-Right Optimized B Complex Tablet: 1 tablet orally once a day  biotin 5000 mcg oral capsule: 1 cap(s) orally once a day  carvedilol 25 mg oral tablet: 1 tab(s) orally 2 times a day  clindamycin 150 mg oral capsule: 1 cap(s) orally for 1 dose  clopidogrel 75 mg oral tablet: 1 tab(s) orally once a day  codeine-acetaminophen 30 mg-300 mg oral tablet: 1 tab(s) orally for 1 dose  D3 25 mcg (1000 intl units) oral tablet: 1 tab(s) orally once a day  diclofenac 1% topical gel: Apply topically to affected area 3 times a day as needed  Farxiga 10 mg oral tablet: 1 tab(s) orally once a day  ferrous sulfate 325 mg (65 mg elemental iron) oral tablet: 1 tab(s) orally every other day with Vitamin C  furosemide 40 mg oral tablet: 1 tab(s) orally once a day  Insulin Glargine: 20 international unit(s) once a day (at bedtime)  lisinopril 5 mg oral tablet: 1 tab(s) orally once a day  Magnesium 250mg Tablet: 1 tablet orally 2 times a day  metFORMIN 500 mg oral tablet, extended release: 1 tab(s) orally 2 times a day  Mounjaro 10 mg/0.5 mL subcutaneous solution: 10 milligram(s) subcutaneously once a week (SAT)  Multiple Vitamins oral tablet: 1 tab(s) orally once a day  potassium chloride 20 mEq oral tablet, extended release: 0.5 tab(s) orally once a day  Probiotic Tablet: 1 tablet orally once a day  Protonix 20 mg oral delayed release tablet: 1 tab(s) orally 2 times a day  rosuvastatin 20 mg oral tablet: 1 tab(s) orally once a day (at bedtime)  Vitamin C Tablet: 1 tablet orally every other day with Ferrous Sulfate  warfarin 2.5 mg oral tablet: 1 tab(s) orally with Warfarin 7.5mg dose (1 tablet of 2.5mg and 5mg)  warfarin 5 mg oral tablet: 1 tab(s) orally every other day alternating with Warfarin Dose of 7.5mg (1 tablet of 2.5mg and 5mg)  Zinc 50mg Tablet: 1 tablet orally once a day  ZyrTEC 10 mg oral tablet: 1 tab(s) orally once a day

## 2025-06-11 NOTE — DISCHARGE NOTE PROVIDER - NSDCFUSCHEDAPPT_GEN_ALL_CORE_FT
Levi Larson  [facility] Physician [facility]  CARDIOLOGY [address]  Scheduled Appointment: 08/18/2025

## 2025-06-11 NOTE — DISCHARGE NOTE NURSING/CASE MANAGEMENT/SOCIAL WORK - PATIENT PORTAL LINK FT
You can access the FollowMyHealth Patient Portal offered by [facility] by registering at the following website: [email or web address]. By joining [employer]’s FollowMyHealth portal, you will also be able to view your health information using other applications (apps) compatible with our system.

## 2025-06-11 NOTE — H&P ADULT - PROBLEM SELECTOR PLAN 1
Likely GI in etiology  EKG unremarkable and TTE from 11/24 reviewed on [facility]  Cardiology consult recs appreciated  No further inpatient management, patient wishes to follow up outpatient with Levi Larson for possible TTE  Return precautions given  Counseled on taking Codeine and Clindamycin with food and maintaining upright position

## 2025-06-11 NOTE — H&P ADULT - ASSESSMENT
76F with PMHx of CAD (stents 20-25 and 3 years ago), Factor V Leiden, atrial flutter, HTN, GERD and T2DM presenting for several hours of chest discomfort after taking codeine and clindamycin. Patient with lateral and negative troponin with unremarkable EKG. Patient admitted for further cardiac work up, though symptoms appear GI in etiology.

## 2025-06-11 NOTE — H&P ADULT - NSHPPHYSICALEXAM_GEN_ALL_CORE
T(C): 36.7 (06-11-25 @ 07:20), Max: 36.7 (06-10-25 @ 23:40)  HR: 69 (06-11-25 @ 07:20) (69 - 72)  BP: 131/62 (06-11-25 @ 07:20) (96/64 - 145/65)  RR: 17 (06-11-25 @ 07:20) (17 - 19)  SpO2: 98% (06-11-25 @ 07:20) (97% - 99%)    GEN: female in NAD, appears comfortable, no diaphoresis  EYES: No scleral injection, EOMI  ENTM: neck supple & symmetric without tracheal deviation, moist membranes, no gross hearing impairment  CV: +S1/S2, no m/r/g, no abdominal bruit, no LE edema  RESP: breathing comfortably, no respiratory accessory muscle use, CTAB, no w/r/r  GI: normoactive BS, soft, NTND, no rebounding/guarding, no palpable masses  LYMPHATICS: no LAD or tenderness to palpation  NEURO: AOx3, no focal deficits, CNII-XII grossly intact  PSYCH: No SI/HI/AVH, appropriate affect, appropriate insight/judgment   SKIN: no petechiae, ecchymosis or maculopapular rash noted

## 2025-06-11 NOTE — ED ADULT NURSE NOTE - NSFALLRISKINTERV_ED_ALL_ED

## 2025-06-11 NOTE — ED ADULT NURSE NOTE - OBJECTIVE STATEMENT
79yoF PMH GERD, IDDM2, CAD (stents), HTN, JAIMIE (home CPAP), Aflutter, Factor 5 Leiden (on warfarin) presents to ED with chest pain. Patient reports chest pain, possible indigestion/reflux beginning this evening at approximately 7pm. Patient had dental surgery this morning, was started on clindamycin and percocet, taken at home this afternoon prior to symptoms starting. Patient has extensive GERD/esophagitis history, had been taking twice daily protonix, was changed to once daily several months ago due to concern for med interaction. On arrival to ED, patient endorses improvement in symptoms. Patient cardioverted successfully for atrial flutter in August 2024, scheduled for ablation for long-term management next month. Denies SOB, N/V/D/C, falls, injuries, fevers, chills, urinary symptoms, headache, confusion, dizziness, diaphoresis.

## 2025-06-11 NOTE — DISCHARGE NOTE PROVIDER - CARE PROVIDER_API CALL
Levi Larson  Cardiology  [address] [facility], [address]  [place] [address]  Phone: [phone]  Fax: [phone]  Established Patient  Follow Up Time: Routine    Kayli Faustin  Family Medicine  [address] [place] [address], [address]  [place] [address]  Phone: [phone]  Fax: [phone]  Established Patient  Follow Up Time: 2 weeks

## 2025-06-11 NOTE — DISCHARGE NOTE PROVIDER - PROVIDER TOKENS
PROVIDER:[TOKEN:[[id number]],FOLLOWUP:[Routine],ESTABLISHEDPATIENT:[T]],PROVIDER:[TOKEN:[[id number]],FOLLOWUP:[2 weeks],ESTABLISHEDPATIENT:[T]]

## 2025-06-11 NOTE — CONSULT NOTE ADULT - ATTENDING COMMENTS
Patient was discharged from the ED before I examined her. However, the plan as documented by the fellow is reasonable. Per the note the description of symptoms seems more likely to be GI than cardiac, however, the patient has an elevated pre-test probability for ischemia based on history/risk factors and should undergo further evaluation, including a TTE and a stress test. Apparently plans were made to do this testing outpatient.

## 2025-06-11 NOTE — CHART NOTE - NSCHARTNOTEFT_GEN_A_CORE
Based on my assessment, this patient’s condition has improved and no longer warrants inpatient status and will be changed to outpatient status prior to being discharged from the hospital.  This decision is based on the following reasons: Patient with resolved chest discomfort which is likely GI in etiology. With shared decision making patient will follow up outpatient with PCP and cardiology. She was given return precautions should symptoms persistent or return.

## 2025-06-11 NOTE — CONSULT NOTE ADULT - SUBJECTIVE AND OBJECTIVE BOX
Cardiology Consult Note  ------------------------------------------------------------------------------------------  SUBJECTIVE:     Patient is a 77 yo F w/ PMHx significant for CAD s/p 3 stents (20-25 years ago, and 3 years ago), factor V Leiden on warfarin, recent dental extraction with bone grafting (on day of presentation), AFL s/p cardioversion (1 years ago) who presented to the ED after an episode of severe indigestion/chest discomfort ~7 PM on 6/10. Patient states she went home after oral surgery and took clindamycin.  SHe was given instructions to not lay flat while taking clindamycin and drink with water and food.  However, patient did not do this and started having chest discomfort and indigestion ~7 PM shortly after taking it.  She also took some acetaminophen–codeine. She described the discomfort as constant burning sensation.  She states that the chest discomfort is possibly similar to her previous heart attack, though she cannot remember specifically.  She denies any fever/chills/cough/sore throat, headache/vision changes, exertional chest pain, trouble breathing, vomiting/diarrhea.  Her grandchildren are sick at home, but she has no symptoms.  Her chest discomfort is resolving during ED presentation.  Patient was previously on pantoprazole twice daily, but is only on pantoprazole once daily now.    ED Course: On arrival to the ED AVSS. CBC WNL. Keshia WNL. hsTrop trend noted at 55 --> 50      -------------------------------------------------------------------------------------------  VS:  T(F): 98.1 (06-10), Max: 98.1 (06-10)  HR: 72 (06-10) (71 - 72)  BP: 145/65 (06-10) (96/64 - 145/65)  RR: 18 (06-10)  SpO2: 99% (06-10)  I&O's Summary    PHYSICAL EXAM:  GENERAL: NAD  HEAD: Atraumatic, Normocephalic.  ENT: Moist mucous membranes.  NECK: Supple, No JVD.  CHEST/LUNG: Clear to auscultation bilaterally; No rales, rhonchi, wheezing, or rubs. Unlabored respirations.  HEART: Regular rate and rhythm; No murmurs, rubs, or gallops.  ABDOMEN: Bowel sounds present; Soft, Nontender, Nondistended.   EXTREMITIES: No edema. 2+ Peripheral Pulses, brisk capillary refill. No clubbing or cyanosis.     -------------------------------------------------------------------------------------------  LABS:                          11.2   7.90  )-----------( 200      ( 10 Zachary 2025 23:19 )             34.6     06-10    139  |  102  |  44[H]  ----------------------------<  128[H]  4.7   |  23  |  1.30    Ca    9.6      10 Zachary 2025 23:19    TPro  7.3  /  Alb  4.1  /  TBili  0.8  /  DBili  x   /  AST  134[H]  /  ALT  63[H]  /  AlkPhos  64  06-10    PT/INR - ( 10 Zachary 2025 23:19 )   PT: 23.3 sec;   INR: 2.06 ratio         PTT - ( 10 Zachary 2025 23:19 )  PTT:37.8 sec  CARDIAC MARKERS ( 11 Jun 2025 02:28 )  50 ng/L / x     / x     / x     / x     / 3.0 ng/mL  CARDIAC MARKERS ( 10 Zachary 2025 23:19 )  55 ng/L / x     / x     / x     / x     / 3.3 ng/mL            -------------------------------------------------------------------------------------------  Meds:    -------------------------------------------------------------------------------------------  Cardiovascular Diagnostic Testing:    ECG: NSR, 1s degree AVB, LBBB (seen on priors)    Echo: Nov 26 2024. Normal LV function. Moderate aortic stenosis    Stress Testing: Jan 18 2022. Normal vasodilator myocardial perfusion stress test     Cath: PCI to LAD and RCA  in 2019    -------------------------------------------------------------------------------------------   Cardiology Consult Note  ------------------------------------------------------------------------------------------  SUBJECTIVE:     Patient is a 75 yo F w/ PMHx significant for CAD s/p 3 stents (20-25 years ago, and 3 years ago), factor V Leiden on warfarin, recent dental extraction with bone grafting (on day of presentation), AFL s/p cardioversion (1 years ago) who presented to the ED after an episode of severe indigestion/chest discomfort ~7 PM on 6/10. Patient states she went home after oral surgery and took clindamycin.  SHe was given instructions to not lay flat while taking clindamycin and drink with water and food.  However, patient did not do this and started having chest discomfort and indigestion ~7 PM shortly after taking it.  She also took some acetaminophen–codeine. She described the discomfort as constant burning sensation.  She states that the chest discomfort is possibly similar to her previous heart attack, though she cannot remember specifically.  She denies any fever/chills/cough/sore throat, headache/vision changes, exertional chest pain, trouble breathing, vomiting/diarrhea.  Her grandchildren are sick at home, but she has no symptoms.  Her chest discomfort is resolving during ED presentation.  Patient was previously on pantoprazole twice daily, but is only on pantoprazole once daily now.    ED Course: On arrival to the ED AVSS. CBC WNL. Lytes WNL. hsTrop trend noted at 55 --> 50. Patient received famotidine in [facility] ED w/ resolution of sx. Cardiology are consulted given chest pain, transient rise in hsTrops.       -------------------------------------------------------------------------------------------  VS:  T(F): 98.1 (06-10), Max: 98.1 (06-10)  HR: 72 (06-10) (71 - 72)  BP: 145/65 (06-10) (96/64 - 145/65)  RR: 18 (06-10)  SpO2: 99% (06-10)  I&O's Summary    PHYSICAL EXAM:  GENERAL: NAD  HEAD: Atraumatic, Normocephalic.  ENT: Moist mucous membranes.  NECK: Supple, No JVD.  CHEST/LUNG: Clear to auscultation bilaterally; No rales, rhonchi, wheezing, or rubs. Unlabored respirations.  HEART: Regular rate and rhythm; No murmurs, rubs, or gallops.  ABDOMEN: Bowel sounds present; Soft, Nontender, Nondistended.   EXTREMITIES: No edema. 2+ Peripheral Pulses, brisk capillary refill. No clubbing or cyanosis.     -------------------------------------------------------------------------------------------  LABS:                          11.2   7.90  )-----------( 200      ( 10 Zachary 2025 23:19 )             34.6     06-10    139  |  102  |  44[H]  ----------------------------<  128[H]  4.7   |  23  |  1.30    Ca    9.6      10 Zachary 2025 23:19    TPro  7.3  /  Alb  4.1  /  TBili  0.8  /  DBili  x   /  AST  134[H]  /  ALT  63[H]  /  AlkPhos  64  06-10    PT/INR - ( 10 Zachary 2025 23:19 )   PT: 23.3 sec;   INR: 2.06 ratio         PTT - ( 10 Zachary 2025 23:19 )  PTT:37.8 sec  CARDIAC MARKERS ( 11 Jun 2025 02:28 )  50 ng/L / x     / x     / x     / x     / 3.0 ng/mL  CARDIAC MARKERS ( 10 Zachary 2025 23:19 )  55 ng/L / x     / x     / x     / x     / 3.3 ng/mL            -------------------------------------------------------------------------------------------  Meds:    -------------------------------------------------------------------------------------------  Cardiovascular Diagnostic Testing:    ECG: NSR, 1s degree AVB, LBBB (seen on priors)    Echo: Nov 26 2024. Normal LV function. Moderate aortic stenosis    Stress Testing: Jan 18 2022. Normal vasodilator myocardial perfusion stress test     Cath: PCI to LAD and RCA  in 2019    -------------------------------------------------------------------------------------------

## 2025-06-11 NOTE — DISCHARGE NOTE NURSING/CASE MANAGEMENT/SOCIAL WORK - FINANCIAL ASSISTANCE
[facility] provides services at a reduced cost to those who are determined to be eligible through [facility]’s financial assistance program. Information regarding [facility]’s financial assistance program can be found by going to [email or web address] or by calling [phone].

## 2025-06-11 NOTE — CONSULT NOTE ADULT - ASSESSMENT
Patient is a 75 yo F w/ PMHx significant for CAD s/p 3 stents (20-25 years ago, and 3 years ago), factor V Leiden on warfarin, recent dental extraction with bone grafting (on day of presentation), AFL s/p cardioversion (1 years ago) who presented to the ED after an episode of severe indigestion/chest discomfort ~7 PM on 6/10. Patient states she went home after oral surgery and took clindamycin.  SHe was given instructions to not lay flat while taking clindamycin and drink with water and food.  However, patient did not do this and started having chest discomfort and indigestion ~7 PM shortly after taking it.  She also took some acetaminophen–codeine. She described the discomfort as constant burning sensation.  She states that the chest discomfort is possibly similar to her previous heart attack, though she cannot remember specifically. On arrival to the ED AVSS. CBC WNL. Lytes WNL. hsTrop trend noted at 55 --> 50. Patient received famotidine in [facility] ED w/ resolution of sx. Cardiology are consulted given chest pain, transient rise in hsTrops.     #Chest pain  #Elevated cardiac enzymes    - Presented with burning-like substernal chest pain, after large pill ingestion, relieved w/ H2 blocker. Less likely cardiac chest pain  - hsTrop trend noted at 55 --> 50 i/s/o recent surgery and known CAD  - Low index of suspicion for ACS  - Can obtain TTE  - Can likely defer further ischemic evaluation to OP setting  - Resume home meds    ***recommendations are not final until attending attestation***

## 2025-06-11 NOTE — H&P ADULT - TIME BILLING
- Ordering, reviewing, and interpreting labs, testing, and imaging  - Independently obtaining a review of systems (if applicable) and performing a physical exam  - Reviewing consultant & nursing documentation  - If residents are involved in the case then time is separate from teaching

## 2025-06-11 NOTE — DISCHARGE NOTE PROVIDER - CARE PROVIDERS DIRECT ADDRESSES
[email or web address]

## 2025-06-11 NOTE — ED ADULT NURSE NOTE - NS_SISCREENINGSR_GEN_ALL_ED
Negative Adbry Pregnancy And Lactation Text: It is unknown if this medication will adversely affect pregnancy or breast feeding.

## 2025-06-16 RX ORDER — TRAMADOL HYDROCHLORIDE AND ACETAMINOPHEN 37.5; 325 MG/1; MG/1
1 TABLET ORAL
Refills: 0 | DISCHARGE

## 2025-06-16 RX ORDER — DAPAGLIFLOZIN 5 MG/1
1 TABLET, FILM COATED ORAL
Refills: 0 | DISCHARGE

## 2025-06-16 RX ORDER — B1/B2/B3/B5/B6/B12/VIT C/FOLIC 500-0.5 MG
0 TABLET ORAL
Refills: 0 | DISCHARGE

## 2025-06-16 RX ORDER — FERROUS SULFATE 137(45) MG
1 TABLET, EXTENDED RELEASE ORAL
Refills: 0 | DISCHARGE

## 2025-06-16 RX ORDER — DICLOFENAC SODIUM 10 MG/G
2.25 GEL TOPICAL
Refills: 0 | DISCHARGE

## 2025-06-16 RX ORDER — TIRZEPATIDE 7.5 MG/.5ML
10 INJECTION, SOLUTION SUBCUTANEOUS
Refills: 0 | DISCHARGE

## 2025-06-16 RX ORDER — BIOTIN 10 MG
1 TABLET ORAL
Refills: 0 | DISCHARGE

## 2025-06-16 RX ORDER — METFORMIN HYDROCHLORIDE 850 MG/1
1 TABLET ORAL
Refills: 0 | DISCHARGE

## 2025-06-16 RX ORDER — CLINDAMYCIN PHOSPHATE 150 MG/ML
1 VIAL (ML) INJECTION
Refills: 0 | DISCHARGE

## 2025-06-16 RX ORDER — B1/B2/B3/B5/B6/B12/VIT C/FOLIC 500-0.5 MG
1 TABLET ORAL
Refills: 0 | DISCHARGE

## 2025-06-27 LAB — INR PPP: 2

## 2025-07-18 LAB — INR PPP: 2.3

## 2025-07-29 RX ORDER — TIRZEPATIDE 10 MG/.5ML
10 INJECTION, SOLUTION SUBCUTANEOUS
Refills: 0 | Status: ACTIVE | COMMUNITY

## 2025-08-06 ENCOUNTER — INPATIENT (INPATIENT)
Facility: HOSPITAL | Age: 80
LOS: 0 days | Discharge: HOME | DRG: 310 | End: 2025-08-07
Attending: INTERNAL MEDICINE | Admitting: INTERNAL MEDICINE
Payer: MEDICARE

## 2025-08-06 ENCOUNTER — TRANSCRIPTION ENCOUNTER (OUTPATIENT)
Age: 80
End: 2025-08-06

## 2025-08-06 VITALS
RESPIRATION RATE: 16 BRPM | OXYGEN SATURATION: 98 % | SYSTOLIC BLOOD PRESSURE: 160 MMHG | HEART RATE: 68 BPM | DIASTOLIC BLOOD PRESSURE: 68 MMHG

## 2025-08-06 DIAGNOSIS — Z98.890 OTHER SPECIFIED POSTPROCEDURAL STATES: Chronic | ICD-10-CM

## 2025-08-06 DIAGNOSIS — Z90.89 ACQUIRED ABSENCE OF OTHER ORGANS: Chronic | ICD-10-CM

## 2025-08-06 DIAGNOSIS — Z87.39 PERSONAL HISTORY OF OTHER DISEASES OF THE MUSCULOSKELETAL SYSTEM AND CONNECTIVE TISSUE: Chronic | ICD-10-CM

## 2025-08-06 DIAGNOSIS — T14.90XA INJURY, UNSPECIFIED, INITIAL ENCOUNTER: Chronic | ICD-10-CM

## 2025-08-06 DIAGNOSIS — Z90.49 ACQUIRED ABSENCE OF OTHER SPECIFIED PARTS OF DIGESTIVE TRACT: Chronic | ICD-10-CM

## 2025-08-06 DIAGNOSIS — I48.3 TYPICAL ATRIAL FLUTTER: ICD-10-CM

## 2025-08-06 DIAGNOSIS — Z90.710 ACQUIRED ABSENCE OF BOTH CERVIX AND UTERUS: Chronic | ICD-10-CM

## 2025-08-06 LAB
ANION GAP SERPL CALC-SCNC: 17 MMOL/L — SIGNIFICANT CHANGE UP (ref 5–17)
APTT BLD: 36.2 SEC — SIGNIFICANT CHANGE UP (ref 26.1–36.8)
BUN SERPL-MCNC: 44 MG/DL — HIGH (ref 7–23)
CALCIUM SERPL-MCNC: 10.1 MG/DL — SIGNIFICANT CHANGE UP (ref 8.4–10.5)
CHLORIDE SERPL-SCNC: 106 MMOL/L — SIGNIFICANT CHANGE UP (ref 96–108)
CO2 SERPL-SCNC: 21 MMOL/L — LOW (ref 22–31)
CREAT SERPL-MCNC: 1.2 MG/DL — SIGNIFICANT CHANGE UP (ref 0.5–1.3)
EGFR: 46 ML/MIN/1.73M2 — LOW
EGFR: 46 ML/MIN/1.73M2 — LOW
GLUCOSE BLDC GLUCOMTR-MCNC: 101 MG/DL — HIGH (ref 70–99)
GLUCOSE BLDC GLUCOMTR-MCNC: 104 MG/DL — HIGH (ref 70–99)
GLUCOSE SERPL-MCNC: 109 MG/DL — HIGH (ref 70–99)
HCT VFR BLD CALC: 38.8 % — SIGNIFICANT CHANGE UP (ref 34.5–45)
HGB BLD-MCNC: 12.5 G/DL — SIGNIFICANT CHANGE UP (ref 11.5–15.5)
INR BLD: 1.64 RATIO — HIGH (ref 0.85–1.16)
MAGNESIUM SERPL-MCNC: 1.8 MG/DL — SIGNIFICANT CHANGE UP (ref 1.6–2.6)
MCHC RBC-ENTMCNC: 29.5 PG — SIGNIFICANT CHANGE UP (ref 27–34)
MCHC RBC-ENTMCNC: 32.2 G/DL — SIGNIFICANT CHANGE UP (ref 32–36)
MCV RBC AUTO: 91.5 FL — SIGNIFICANT CHANGE UP (ref 80–100)
NRBC # BLD AUTO: 0 K/UL — SIGNIFICANT CHANGE UP (ref 0–0)
NRBC # FLD: 0 K/UL — SIGNIFICANT CHANGE UP (ref 0–0)
NRBC BLD AUTO-RTO: 0 /100 WBCS — SIGNIFICANT CHANGE UP (ref 0–0)
PLATELET # BLD AUTO: 209 K/UL — SIGNIFICANT CHANGE UP (ref 150–400)
PMV BLD: 9.9 FL — SIGNIFICANT CHANGE UP (ref 7–13)
POTASSIUM SERPL-MCNC: 4.4 MMOL/L — SIGNIFICANT CHANGE UP (ref 3.5–5.3)
POTASSIUM SERPL-SCNC: 4.4 MMOL/L — SIGNIFICANT CHANGE UP (ref 3.5–5.3)
PROTHROM AB SERPL-ACNC: 18.6 SEC — HIGH (ref 9.9–13.4)
RBC # BLD: 4.24 M/UL — SIGNIFICANT CHANGE UP (ref 3.8–5.2)
RBC # FLD: 13.4 % — SIGNIFICANT CHANGE UP (ref 10.3–14.5)
SODIUM SERPL-SCNC: 144 MMOL/L — SIGNIFICANT CHANGE UP (ref 135–145)
WBC # BLD: 6.72 K/UL — SIGNIFICANT CHANGE UP (ref 3.8–10.5)
WBC # FLD AUTO: 6.72 K/UL — SIGNIFICANT CHANGE UP (ref 3.8–10.5)

## 2025-08-06 PROCEDURE — 86900 BLOOD TYPING SEROLOGIC ABO: CPT

## 2025-08-06 PROCEDURE — 93653 COMPRE EP EVAL TX SVT: CPT

## 2025-08-06 PROCEDURE — 99152 MOD SED SAME PHYS/QHP 5/>YRS: CPT

## 2025-08-06 PROCEDURE — 93010 ELECTROCARDIOGRAM REPORT: CPT | Mod: 76

## 2025-08-06 PROCEDURE — 85730 THROMBOPLASTIN TIME PARTIAL: CPT

## 2025-08-06 PROCEDURE — 86850 RBC ANTIBODY SCREEN: CPT

## 2025-08-06 PROCEDURE — 80048 BASIC METABOLIC PNL TOTAL CA: CPT

## 2025-08-06 PROCEDURE — 82962 GLUCOSE BLOOD TEST: CPT

## 2025-08-06 PROCEDURE — 83735 ASSAY OF MAGNESIUM: CPT

## 2025-08-06 PROCEDURE — 85027 COMPLETE CBC AUTOMATED: CPT

## 2025-08-06 PROCEDURE — 86901 BLOOD TYPING SEROLOGIC RH(D): CPT

## 2025-08-06 PROCEDURE — 85610 PROTHROMBIN TIME: CPT

## 2025-08-06 RX ORDER — DEXTROSE 50 % IN WATER 50 %
25 SYRINGE (ML) INTRAVENOUS ONCE
Refills: 0 | Status: DISCONTINUED | OUTPATIENT
Start: 2025-08-06 | End: 2025-08-07

## 2025-08-06 RX ORDER — CARVEDILOL 3.12 MG/1
25 TABLET, FILM COATED ORAL EVERY 12 HOURS
Refills: 0 | Status: DISCONTINUED | OUTPATIENT
Start: 2025-08-06 | End: 2025-08-07

## 2025-08-06 RX ORDER — SODIUM CHLORIDE 9 G/1000ML
1000 INJECTION, SOLUTION INTRAVENOUS
Refills: 0 | Status: DISCONTINUED | OUTPATIENT
Start: 2025-08-06 | End: 2025-08-07

## 2025-08-06 RX ORDER — ROSUVASTATIN CALCIUM 20 MG/1
20 TABLET, FILM COATED ORAL AT BEDTIME
Refills: 0 | Status: DISCONTINUED | OUTPATIENT
Start: 2025-08-06 | End: 2025-08-07

## 2025-08-06 RX ORDER — GLUCAGON 3 MG/1
1 POWDER NASAL ONCE
Refills: 0 | Status: DISCONTINUED | OUTPATIENT
Start: 2025-08-06 | End: 2025-08-07

## 2025-08-06 RX ORDER — INSULIN GLARGINE-YFGN 100 [IU]/ML
20 INJECTION, SOLUTION SUBCUTANEOUS
Refills: 0 | DISCHARGE

## 2025-08-06 RX ORDER — DEXTROSE 50 % IN WATER 50 %
12.5 SYRINGE (ML) INTRAVENOUS ONCE
Refills: 0 | Status: DISCONTINUED | OUTPATIENT
Start: 2025-08-06 | End: 2025-08-07

## 2025-08-06 RX ORDER — DEXTROSE 50 % IN WATER 50 %
15 SYRINGE (ML) INTRAVENOUS ONCE
Refills: 0 | Status: DISCONTINUED | OUTPATIENT
Start: 2025-08-06 | End: 2025-08-07

## 2025-08-06 RX ORDER — INSULIN GLARGINE-YFGN 100 [IU]/ML
16 INJECTION, SOLUTION SUBCUTANEOUS AT BEDTIME
Refills: 0 | Status: DISCONTINUED | OUTPATIENT
Start: 2025-08-06 | End: 2025-08-07

## 2025-08-06 RX ORDER — CLOPIDOGREL BISULFATE 75 MG/1
75 TABLET, FILM COATED ORAL DAILY
Refills: 0 | Status: DISCONTINUED | OUTPATIENT
Start: 2025-08-06 | End: 2025-08-07

## 2025-08-06 RX ORDER — LISINOPRIL 5 MG/1
1 TABLET ORAL
Refills: 0 | DISCHARGE

## 2025-08-06 RX ORDER — AMLODIPINE BESYLATE 10 MG/1
5 TABLET ORAL DAILY
Refills: 0 | Status: DISCONTINUED | OUTPATIENT
Start: 2025-08-06 | End: 2025-08-07

## 2025-08-06 RX ORDER — INSULIN LISPRO 100 U/ML
INJECTION, SOLUTION INTRAVENOUS; SUBCUTANEOUS AT BEDTIME
Refills: 0 | Status: DISCONTINUED | OUTPATIENT
Start: 2025-08-06 | End: 2025-08-07

## 2025-08-06 RX ORDER — LISINOPRIL 5 MG/1
5 TABLET ORAL DAILY
Refills: 0 | Status: DISCONTINUED | OUTPATIENT
Start: 2025-08-06 | End: 2025-08-07

## 2025-08-06 RX ORDER — CARVEDILOL 3.12 MG/1
1 TABLET, FILM COATED ORAL
Refills: 0 | DISCHARGE

## 2025-08-06 RX ORDER — ROSUVASTATIN CALCIUM 20 MG/1
1 TABLET, FILM COATED ORAL
Refills: 0 | DISCHARGE

## 2025-08-06 RX ORDER — INSULIN LISPRO 100 U/ML
INJECTION, SOLUTION INTRAVENOUS; SUBCUTANEOUS
Refills: 0 | Status: DISCONTINUED | OUTPATIENT
Start: 2025-08-06 | End: 2025-08-07

## 2025-08-06 RX ORDER — FUROSEMIDE 10 MG/ML
40 INJECTION INTRAMUSCULAR; INTRAVENOUS DAILY
Refills: 0 | Status: DISCONTINUED | OUTPATIENT
Start: 2025-08-06 | End: 2025-08-07

## 2025-08-06 RX ADMIN — INSULIN GLARGINE-YFGN 16 UNIT(S): 100 INJECTION, SOLUTION SUBCUTANEOUS at 22:01

## 2025-08-06 RX ADMIN — CLOPIDOGREL BISULFATE 75 MILLIGRAM(S): 75 TABLET, FILM COATED ORAL at 22:02

## 2025-08-06 RX ADMIN — Medication 5 MILLIGRAM(S): at 22:02

## 2025-08-06 RX ADMIN — ROSUVASTATIN CALCIUM 20 MILLIGRAM(S): 20 TABLET, FILM COATED ORAL at 22:02

## 2025-08-07 ENCOUNTER — TRANSCRIPTION ENCOUNTER (OUTPATIENT)
Age: 80
End: 2025-08-07

## 2025-08-07 VITALS
HEART RATE: 60 BPM | OXYGEN SATURATION: 98 % | RESPIRATION RATE: 17 BRPM | TEMPERATURE: 98 F | DIASTOLIC BLOOD PRESSURE: 64 MMHG | SYSTOLIC BLOOD PRESSURE: 136 MMHG

## 2025-08-07 LAB
ANION GAP SERPL CALC-SCNC: 14 MMOL/L — SIGNIFICANT CHANGE UP (ref 5–17)
BUN SERPL-MCNC: 40 MG/DL — HIGH (ref 7–23)
CALCIUM SERPL-MCNC: 9.3 MG/DL — SIGNIFICANT CHANGE UP (ref 8.4–10.5)
CHLORIDE SERPL-SCNC: 109 MMOL/L — HIGH (ref 96–108)
CO2 SERPL-SCNC: 21 MMOL/L — LOW (ref 22–31)
CREAT SERPL-MCNC: 1.1 MG/DL — SIGNIFICANT CHANGE UP (ref 0.5–1.3)
EGFR: 51 ML/MIN/1.73M2 — LOW
EGFR: 51 ML/MIN/1.73M2 — LOW
GLUCOSE BLDC GLUCOMTR-MCNC: 135 MG/DL — HIGH (ref 70–99)
GLUCOSE SERPL-MCNC: 118 MG/DL — HIGH (ref 70–99)
HCT VFR BLD CALC: 34.9 % — SIGNIFICANT CHANGE UP (ref 34.5–45)
HGB BLD-MCNC: 11.1 G/DL — LOW (ref 11.5–15.5)
INR BLD: 1.72 RATIO — HIGH (ref 0.85–1.16)
MAGNESIUM SERPL-MCNC: 1.8 MG/DL — SIGNIFICANT CHANGE UP (ref 1.6–2.6)
MCHC RBC-ENTMCNC: 29 PG — SIGNIFICANT CHANGE UP (ref 27–34)
MCHC RBC-ENTMCNC: 31.8 G/DL — LOW (ref 32–36)
MCV RBC AUTO: 91.1 FL — SIGNIFICANT CHANGE UP (ref 80–100)
NRBC # BLD AUTO: 0 K/UL — SIGNIFICANT CHANGE UP (ref 0–0)
NRBC # FLD: 0 K/UL — SIGNIFICANT CHANGE UP (ref 0–0)
NRBC BLD AUTO-RTO: 0 /100 WBCS — SIGNIFICANT CHANGE UP (ref 0–0)
PHOSPHATE SERPL-MCNC: 3.2 MG/DL — SIGNIFICANT CHANGE UP (ref 2.5–4.5)
PLATELET # BLD AUTO: 180 K/UL — SIGNIFICANT CHANGE UP (ref 150–400)
PMV BLD: 10.1 FL — SIGNIFICANT CHANGE UP (ref 7–13)
POTASSIUM SERPL-MCNC: 4.1 MMOL/L — SIGNIFICANT CHANGE UP (ref 3.5–5.3)
POTASSIUM SERPL-SCNC: 4.1 MMOL/L — SIGNIFICANT CHANGE UP (ref 3.5–5.3)
PROTHROM AB SERPL-ACNC: 19.7 SEC — HIGH (ref 9.9–13.4)
RBC # BLD: 3.83 M/UL — SIGNIFICANT CHANGE UP (ref 3.8–5.2)
RBC # FLD: 13.4 % — SIGNIFICANT CHANGE UP (ref 10.3–14.5)
SODIUM SERPL-SCNC: 144 MMOL/L — SIGNIFICANT CHANGE UP (ref 135–145)
WBC # BLD: 6.88 K/UL — SIGNIFICANT CHANGE UP (ref 3.8–10.5)
WBC # FLD AUTO: 6.88 K/UL — SIGNIFICANT CHANGE UP (ref 3.8–10.5)

## 2025-08-07 PROCEDURE — 86900 BLOOD TYPING SEROLOGIC ABO: CPT

## 2025-08-07 PROCEDURE — 80048 BASIC METABOLIC PNL TOTAL CA: CPT

## 2025-08-07 PROCEDURE — 83735 ASSAY OF MAGNESIUM: CPT

## 2025-08-07 PROCEDURE — C1732: CPT

## 2025-08-07 PROCEDURE — 85027 COMPLETE CBC AUTOMATED: CPT

## 2025-08-07 PROCEDURE — 86901 BLOOD TYPING SEROLOGIC RH(D): CPT

## 2025-08-07 PROCEDURE — 94660 CPAP INITIATION&MGMT: CPT

## 2025-08-07 PROCEDURE — C1730: CPT

## 2025-08-07 PROCEDURE — C1766: CPT

## 2025-08-07 PROCEDURE — C1894: CPT

## 2025-08-07 PROCEDURE — 86850 RBC ANTIBODY SCREEN: CPT

## 2025-08-07 PROCEDURE — 82962 GLUCOSE BLOOD TEST: CPT

## 2025-08-07 PROCEDURE — 85610 PROTHROMBIN TIME: CPT

## 2025-08-07 PROCEDURE — 93005 ELECTROCARDIOGRAM TRACING: CPT

## 2025-08-07 PROCEDURE — 93653 COMPRE EP EVAL TX SVT: CPT

## 2025-08-07 PROCEDURE — 85730 THROMBOPLASTIN TIME PARTIAL: CPT

## 2025-08-07 PROCEDURE — 84100 ASSAY OF PHOSPHORUS: CPT

## 2025-08-07 PROCEDURE — 99232 SBSQ HOSP IP/OBS MODERATE 35: CPT

## 2025-08-07 RX ORDER — ROSUVASTATIN CALCIUM 20 MG/1
1 TABLET, FILM COATED ORAL
Qty: 0 | Refills: 0 | DISCHARGE
Start: 2025-08-07

## 2025-08-07 RX ORDER — CLOPIDOGREL BISULFATE 75 MG/1
1 TABLET, FILM COATED ORAL
Qty: 0 | Refills: 0 | DISCHARGE
Start: 2025-08-07

## 2025-08-07 RX ORDER — MAGNESIUM SULFATE 500 MG/ML
1 SYRINGE (ML) INJECTION ONCE
Refills: 0 | Status: COMPLETED | OUTPATIENT
Start: 2025-08-07 | End: 2025-08-07

## 2025-08-07 RX ADMIN — CARVEDILOL 25 MILLIGRAM(S): 3.12 TABLET, FILM COATED ORAL at 05:23

## 2025-08-07 RX ADMIN — LISINOPRIL 5 MILLIGRAM(S): 5 TABLET ORAL at 05:23

## 2025-08-07 RX ADMIN — AMLODIPINE BESYLATE 5 MILLIGRAM(S): 10 TABLET ORAL at 05:23

## 2025-08-07 RX ADMIN — Medication 100 GRAM(S): at 05:23

## 2025-08-07 RX ADMIN — Medication 40 MILLIGRAM(S): at 05:22

## 2025-08-08 RX ORDER — METFORMIN HYDROCHLORIDE 750 MG/1
TABLET, EXTENDED RELEASE ORAL
Refills: 0 | Status: COMPLETED | COMMUNITY
End: 2025-08-08

## 2025-08-08 RX ORDER — METFORMIN HYDROCHLORIDE 500 MG/1
500 TABLET, EXTENDED RELEASE ORAL TWICE DAILY
Qty: 180 | Refills: 0 | Status: ACTIVE | COMMUNITY
Start: 2025-08-08

## 2025-09-05 ENCOUNTER — APPOINTMENT (OUTPATIENT)
Dept: CARDIOLOGY | Facility: CLINIC | Age: 80
End: 2025-09-05
Payer: MEDICARE

## 2025-09-05 ENCOUNTER — APPOINTMENT (OUTPATIENT)
Dept: FAMILY MEDICINE | Facility: CLINIC | Age: 80
End: 2025-09-05
Payer: MEDICARE

## 2025-09-05 VITALS
BODY MASS INDEX: 38.71 KG/M2 | WEIGHT: 205 LBS | OXYGEN SATURATION: 98 % | DIASTOLIC BLOOD PRESSURE: 75 MMHG | HEART RATE: 71 BPM | RESPIRATION RATE: 18 BRPM | HEIGHT: 61 IN | SYSTOLIC BLOOD PRESSURE: 135 MMHG

## 2025-09-05 VITALS
WEIGHT: 205 LBS | HEART RATE: 77 BPM | TEMPERATURE: 97.3 F | BODY MASS INDEX: 38.71 KG/M2 | SYSTOLIC BLOOD PRESSURE: 130 MMHG | HEIGHT: 61 IN | OXYGEN SATURATION: 98 % | DIASTOLIC BLOOD PRESSURE: 62 MMHG | RESPIRATION RATE: 17 BRPM

## 2025-09-05 DIAGNOSIS — I25.10 ATHEROSCLEROTIC HEART DISEASE OF NATIVE CORONARY ARTERY W/OUT ANGINA PECTORIS: ICD-10-CM

## 2025-09-05 DIAGNOSIS — I48.92 UNSPECIFIED ATRIAL FLUTTER: ICD-10-CM

## 2025-09-05 DIAGNOSIS — I35.0 NONRHEUMATIC AORTIC (VALVE) STENOSIS: ICD-10-CM

## 2025-09-05 DIAGNOSIS — Z79.2 LONG TERM (CURRENT) USE OF ANTIBIOTICS: ICD-10-CM

## 2025-09-05 DIAGNOSIS — I10 ESSENTIAL (PRIMARY) HYPERTENSION: ICD-10-CM

## 2025-09-05 DIAGNOSIS — E11.9 TYPE 2 DIABETES MELLITUS W/OUT COMPLICATIONS: ICD-10-CM

## 2025-09-05 DIAGNOSIS — D64.9 ANEMIA, UNSPECIFIED: ICD-10-CM

## 2025-09-05 DIAGNOSIS — E55.9 VITAMIN D DEFICIENCY, UNSPECIFIED: ICD-10-CM

## 2025-09-05 DIAGNOSIS — Z79.01 LONG TERM (CURRENT) USE OF ANTICOAGULANTS: ICD-10-CM

## 2025-09-05 PROCEDURE — 99215 OFFICE O/P EST HI 40 MIN: CPT

## 2025-09-05 PROCEDURE — 81003 URINALYSIS AUTO W/O SCOPE: CPT | Mod: QW

## 2025-09-05 PROCEDURE — 99214 OFFICE O/P EST MOD 30 MIN: CPT

## 2025-09-05 PROCEDURE — 93000 ELECTROCARDIOGRAM COMPLETE: CPT

## 2025-09-05 PROCEDURE — 36415 COLL VENOUS BLD VENIPUNCTURE: CPT

## 2025-09-05 RX ORDER — MAGNESIUM OXIDE/MAG AA CHELATE 300 MG
300 CAPSULE ORAL
Refills: 0 | Status: ACTIVE | COMMUNITY

## 2025-09-06 ENCOUNTER — LABORATORY RESULT (OUTPATIENT)
Age: 80
End: 2025-09-06

## 2025-09-07 LAB
25(OH)D3 SERPL-MCNC: 60.1 NG/ML
ALBUMIN SERPL ELPH-MCNC: 4.4 G/DL
ALP BLD-CCNC: 50 U/L
ALT SERPL-CCNC: 34 U/L
ANION GAP SERPL CALC-SCNC: 15 MMOL/L
APPEARANCE: CLEAR
AST SERPL-CCNC: 35 U/L
BASOPHILS # BLD AUTO: 0.04 K/UL
BASOPHILS NFR BLD AUTO: 0.7 %
BILIRUB SERPL-MCNC: 0.3 MG/DL
BILIRUBIN URINE: NEGATIVE
BLOOD URINE: NEGATIVE
BUN SERPL-MCNC: 51 MG/DL
CALCIUM SERPL-MCNC: 9.5 MG/DL
CHLORIDE SERPL-SCNC: 103 MMOL/L
CHOLEST SERPL-MCNC: 143 MG/DL
CO2 SERPL-SCNC: 23 MMOL/L
COLOR: NORMAL
CREAT SERPL-MCNC: 1.36 MG/DL
CREAT SPEC-SCNC: 68 MG/DL
CREAT/PROT UR: 0.3 RATIO
EGFRCR SERPLBLD CKD-EPI 2021: 40 ML/MIN/1.73M2
EOSINOPHIL # BLD AUTO: 0.16 K/UL
EOSINOPHIL NFR BLD AUTO: 3 %
ESTIMATED AVERAGE GLUCOSE: 140 MG/DL
FERRITIN SERPL-MCNC: 109 NG/ML
FOLATE SERPL-MCNC: >20 NG/ML
GLUCOSE QUALITATIVE U: 250 MG/DL
GLUCOSE SERPL-MCNC: 100 MG/DL
HBA1C MFR BLD HPLC: 6.5 %
HCT VFR BLD CALC: 34.2 %
HDLC SERPL-MCNC: 39 MG/DL
HGB BLD-MCNC: 11.1 G/DL
IMM GRANULOCYTES NFR BLD AUTO: 0.2 %
IRON SATN MFR SERPL: 16 %
IRON SERPL-MCNC: 55 UG/DL
KETONES URINE: NEGATIVE MG/DL
LDLC SERPL-MCNC: 73 MG/DL
LEUKOCYTE ESTERASE URINE: ABNORMAL
LYMPHOCYTES # BLD AUTO: 1.85 K/UL
LYMPHOCYTES NFR BLD AUTO: 34.2 %
MAGNESIUM SERPL-MCNC: 2 MG/DL
MAN DIFF?: NORMAL
MCHC RBC-ENTMCNC: 30.2 PG
MCHC RBC-ENTMCNC: 32.5 G/DL
MCV RBC AUTO: 92.9 FL
MONOCYTES # BLD AUTO: 0.55 K/UL
MONOCYTES NFR BLD AUTO: 10.2 %
NEUTROPHILS # BLD AUTO: 2.8 K/UL
NEUTROPHILS NFR BLD AUTO: 51.7 %
NITRITE URINE: POSITIVE
NONHDLC SERPL-MCNC: 104 MG/DL
PH URINE: 6.5
PLATELET # BLD AUTO: 187 K/UL
POTASSIUM SERPL-SCNC: 4.5 MMOL/L
PROT SERPL-MCNC: 7.4 G/DL
PROT UR-MCNC: 23 MG/DL
PROTEIN URINE: 30 MG/DL
RBC # BLD: 3.68 M/UL
RBC # FLD: 13.4 %
SODIUM SERPL-SCNC: 141 MMOL/L
SPECIFIC GRAVITY URINE: 1.02
TIBC SERPL-MCNC: 339 UG/DL
TRIGL SERPL-MCNC: 185 MG/DL
TSH SERPL-ACNC: 0.75 UIU/ML
UIBC SERPL-MCNC: 284 UG/DL
UROBILINOGEN URINE: 0.2 MG/DL
VIT B12 SERPL-MCNC: 805 PG/ML
WBC # FLD AUTO: 5.41 K/UL

## 2025-09-07 RX ORDER — CLINDAMYCIN HYDROCHLORIDE 300 MG/1
300 CAPSULE ORAL
Qty: 2 | Refills: 0 | Status: ACTIVE | COMMUNITY
Start: 2025-09-05 | End: 1900-01-01

## 2025-09-11 LAB
HCT VFR BLD CALC: 36.1 %
HGB BLD-MCNC: 11.5 G/DL
MCHC RBC-ENTMCNC: 29.9 PG
MCHC RBC-ENTMCNC: 31.9 G/DL
MCV RBC AUTO: 94 FL
PLATELET # BLD AUTO: 199 K/UL
RBC # BLD: 3.84 M/UL
RBC # FLD: 13.4 %
WBC # FLD AUTO: 6.74 K/UL

## 2025-09-12 LAB — INR PPP: 2.1

## 2025-09-15 LAB — BACTERIA UR CULT: ABNORMAL

## 2025-09-15 RX ORDER — NITROFURANTOIN (MONOHYDRATE/MACROCRYSTALS) 25; 75 MG/1; MG/1
100 CAPSULE ORAL
Qty: 10 | Refills: 0 | Status: ACTIVE | COMMUNITY
Start: 2025-09-15